# Patient Record
Sex: FEMALE | Race: WHITE | NOT HISPANIC OR LATINO | Employment: OTHER | URBAN - METROPOLITAN AREA
[De-identification: names, ages, dates, MRNs, and addresses within clinical notes are randomized per-mention and may not be internally consistent; named-entity substitution may affect disease eponyms.]

---

## 2017-01-18 ENCOUNTER — HOSPITAL ENCOUNTER (OUTPATIENT)
Dept: NON INVASIVE DIAGNOSTICS | Facility: CLINIC | Age: 82
Discharge: HOME | End: 2017-01-18
Payer: MEDICARE

## 2017-01-18 DIAGNOSIS — I65.29 OCCLUSION AND STENOSIS OF UNSPECIFIED CAROTID ARTERY: ICD-10-CM

## 2017-01-18 PROCEDURE — 93880 EXTRACRANIAL BILAT STUDY: CPT

## 2017-09-20 DIAGNOSIS — I65.29 OCCLUSION AND STENOSIS OF UNSPECIFIED CAROTID ARTERY: ICD-10-CM

## 2017-09-25 ENCOUNTER — HOSPITAL ENCOUNTER (OUTPATIENT)
Dept: NON INVASIVE DIAGNOSTICS | Facility: CLINIC | Age: 82
Discharge: HOME | End: 2017-09-25
Payer: MEDICARE

## 2017-09-25 DIAGNOSIS — I65.29 OCCLUSION AND STENOSIS OF UNSPECIFIED CAROTID ARTERY: ICD-10-CM

## 2017-09-25 PROCEDURE — 93880 EXTRACRANIAL BILAT STUDY: CPT

## 2018-03-10 ENCOUNTER — HOSPITAL ENCOUNTER (OUTPATIENT)
Facility: HOSPITAL | Age: 83
Setting detail: OBSERVATION
Discharge: HOME | End: 2018-03-11
Attending: SURGERY | Admitting: SURGERY
Payer: MEDICARE

## 2018-03-10 ENCOUNTER — APPOINTMENT (OUTPATIENT)
Dept: RADIOLOGY | Facility: HOSPITAL | Age: 83
End: 2018-03-10
Payer: MEDICARE

## 2018-03-10 DIAGNOSIS — S06.5X9A SDH (SUBDURAL HEMATOMA) (HCC): ICD-10-CM

## 2018-03-10 DIAGNOSIS — S42.343A: Primary | ICD-10-CM

## 2018-03-10 PROCEDURE — 73070 X-RAY EXAM OF ELBOW: CPT

## 2018-03-10 PROCEDURE — 99220 PR INITIAL OBSERVATION CARE/DAY 70 MINUTES: CPT | Performed by: SURGERY

## 2018-03-10 RX ORDER — METHOCARBAMOL 500 MG/1
500 TABLET, FILM COATED ORAL EVERY 8 HOURS SCHEDULED
Status: DISCONTINUED | OUTPATIENT
Start: 2018-03-10 | End: 2018-03-11 | Stop reason: HOSPADM

## 2018-03-10 RX ORDER — ACETAMINOPHEN 325 MG/1
975 TABLET ORAL EVERY 8 HOURS SCHEDULED
Status: DISCONTINUED | OUTPATIENT
Start: 2018-03-10 | End: 2018-03-11 | Stop reason: HOSPADM

## 2018-03-10 RX ADMIN — METHOCARBAMOL 500 MG: 500 TABLET ORAL at 23:50

## 2018-03-10 RX ADMIN — ACETAMINOPHEN 975 MG: 325 TABLET, FILM COATED ORAL at 23:50

## 2018-03-11 ENCOUNTER — APPOINTMENT (OUTPATIENT)
Dept: RADIOLOGY | Facility: HOSPITAL | Age: 83
End: 2018-03-11
Payer: MEDICARE

## 2018-03-11 VITALS
WEIGHT: 179.9 LBS | TEMPERATURE: 98.2 F | RESPIRATION RATE: 18 BRPM | DIASTOLIC BLOOD PRESSURE: 63 MMHG | BODY MASS INDEX: 31.88 KG/M2 | HEIGHT: 63 IN | HEART RATE: 94 BPM | OXYGEN SATURATION: 94 % | SYSTOLIC BLOOD PRESSURE: 143 MMHG

## 2018-03-11 PROBLEM — S06.5X9A SDH (SUBDURAL HEMATOMA) (HCC): Status: ACTIVE | Noted: 2018-03-11

## 2018-03-11 PROBLEM — S42.343A: Status: ACTIVE | Noted: 2018-03-11

## 2018-03-11 PROBLEM — S06.5XAA SDH (SUBDURAL HEMATOMA): Status: ACTIVE | Noted: 2018-03-11

## 2018-03-11 LAB
ABO GROUP BLD: NORMAL
ANION GAP SERPL CALCULATED.3IONS-SCNC: 10 MMOL/L (ref 4–13)
BASOPHILS # BLD AUTO: 0.02 THOUSANDS/ΜL (ref 0–0.1)
BASOPHILS NFR BLD AUTO: 0 % (ref 0–1)
BLD GP AB SCN SERPL QL: NEGATIVE
BUN SERPL-MCNC: 24 MG/DL (ref 5–25)
CALCIUM SERPL-MCNC: 9.1 MG/DL (ref 8.3–10.1)
CHLORIDE SERPL-SCNC: 107 MMOL/L (ref 100–108)
CO2 SERPL-SCNC: 21 MMOL/L (ref 21–32)
CREAT SERPL-MCNC: 0.97 MG/DL (ref 0.6–1.3)
EOSINOPHIL # BLD AUTO: 0.02 THOUSAND/ΜL (ref 0–0.61)
EOSINOPHIL NFR BLD AUTO: 0 % (ref 0–6)
ERYTHROCYTE [DISTWIDTH] IN BLOOD BY AUTOMATED COUNT: 12.5 % (ref 11.6–15.1)
GFR SERPL CREATININE-BSD FRML MDRD: 54 ML/MIN/1.73SQ M
GLUCOSE SERPL-MCNC: 125 MG/DL (ref 65–140)
HCT VFR BLD AUTO: 37.5 % (ref 34.8–46.1)
HGB BLD-MCNC: 12.9 G/DL (ref 11.5–15.4)
INR PPP: 1.08 (ref 0.86–1.16)
LYMPHOCYTES # BLD AUTO: 1.09 THOUSANDS/ΜL (ref 0.6–4.47)
LYMPHOCYTES NFR BLD AUTO: 12 % (ref 14–44)
MCH RBC QN AUTO: 31.8 PG (ref 26.8–34.3)
MCHC RBC AUTO-ENTMCNC: 34.4 G/DL (ref 31.4–37.4)
MCV RBC AUTO: 92 FL (ref 82–98)
MONOCYTES # BLD AUTO: 0.68 THOUSAND/ΜL (ref 0.17–1.22)
MONOCYTES NFR BLD AUTO: 8 % (ref 4–12)
NEUTROPHILS # BLD AUTO: 7.01 THOUSANDS/ΜL (ref 1.85–7.62)
NEUTS SEG NFR BLD AUTO: 80 % (ref 43–75)
NRBC BLD AUTO-RTO: 0 /100 WBCS
PLATELET # BLD AUTO: 275 THOUSANDS/UL (ref 149–390)
PMV BLD AUTO: 9.7 FL (ref 8.9–12.7)
POTASSIUM SERPL-SCNC: 4.1 MMOL/L (ref 3.5–5.3)
PROTHROMBIN TIME: 14 SECONDS (ref 12.1–14.4)
RBC # BLD AUTO: 4.06 MILLION/UL (ref 3.81–5.12)
RH BLD: POSITIVE
SODIUM SERPL-SCNC: 138 MMOL/L (ref 136–145)
SPECIMEN EXPIRATION DATE: NORMAL
WBC # BLD AUTO: 8.84 THOUSAND/UL (ref 4.31–10.16)

## 2018-03-11 PROCEDURE — 86901 BLOOD TYPING SEROLOGIC RH(D): CPT | Performed by: PHYSICIAN ASSISTANT

## 2018-03-11 PROCEDURE — 70450 CT HEAD/BRAIN W/O DYE: CPT

## 2018-03-11 PROCEDURE — 80048 BASIC METABOLIC PNL TOTAL CA: CPT | Performed by: PHYSICIAN ASSISTANT

## 2018-03-11 PROCEDURE — G8988 SELF CARE GOAL STATUS: HCPCS

## 2018-03-11 PROCEDURE — 99217 PR OBSERVATION CARE DISCHARGE MANAGEMENT: CPT | Performed by: NURSE PRACTITIONER

## 2018-03-11 PROCEDURE — G8989 SELF CARE D/C STATUS: HCPCS

## 2018-03-11 PROCEDURE — 97166 OT EVAL MOD COMPLEX 45 MIN: CPT

## 2018-03-11 PROCEDURE — G8987 SELF CARE CURRENT STATUS: HCPCS

## 2018-03-11 PROCEDURE — 85025 COMPLETE CBC W/AUTO DIFF WBC: CPT | Performed by: PHYSICIAN ASSISTANT

## 2018-03-11 PROCEDURE — 85610 PROTHROMBIN TIME: CPT | Performed by: PHYSICIAN ASSISTANT

## 2018-03-11 PROCEDURE — G8978 MOBILITY CURRENT STATUS: HCPCS

## 2018-03-11 PROCEDURE — 99204 OFFICE O/P NEW MOD 45 MIN: CPT | Performed by: ORTHOPAEDIC SURGERY

## 2018-03-11 PROCEDURE — 86900 BLOOD TYPING SEROLOGIC ABO: CPT | Performed by: PHYSICIAN ASSISTANT

## 2018-03-11 PROCEDURE — G8979 MOBILITY GOAL STATUS: HCPCS

## 2018-03-11 PROCEDURE — 99285 EMERGENCY DEPT VISIT HI MDM: CPT

## 2018-03-11 PROCEDURE — 86850 RBC ANTIBODY SCREEN: CPT | Performed by: PHYSICIAN ASSISTANT

## 2018-03-11 PROCEDURE — 97162 PT EVAL MOD COMPLEX 30 MIN: CPT

## 2018-03-11 PROCEDURE — G8980 MOBILITY D/C STATUS: HCPCS

## 2018-03-11 RX ORDER — CARVEDILOL 6.25 MG/1
6.25 TABLET ORAL 2 TIMES DAILY
COMMUNITY

## 2018-03-11 RX ORDER — LEVOTHYROXINE SODIUM 0.03 MG/1
25 TABLET ORAL DAILY
COMMUNITY
End: 2021-02-20 | Stop reason: SDUPTHER

## 2018-03-11 RX ORDER — IBUPROFEN 200 MG
400 TABLET ORAL EVERY 6 HOURS PRN
COMMUNITY
End: 2018-03-11 | Stop reason: HOSPADM

## 2018-03-11 RX ORDER — ENALAPRIL MALEATE 20 MG/1
20 TABLET ORAL 2 TIMES DAILY
COMMUNITY

## 2018-03-11 RX ORDER — ACETAMINOPHEN 325 MG/1
975 TABLET ORAL EVERY 8 HOURS
Qty: 30 TABLET | Refills: 0 | Status: SHIPPED | OUTPATIENT
Start: 2018-03-11 | End: 2021-02-24 | Stop reason: ALTCHOICE

## 2018-03-11 RX ORDER — PRAVASTATIN SODIUM 40 MG
40 TABLET ORAL
COMMUNITY

## 2018-03-11 RX ORDER — METHOCARBAMOL 500 MG/1
500 TABLET, FILM COATED ORAL EVERY 8 HOURS SCHEDULED
Qty: 20 TABLET | Refills: 0 | Status: SHIPPED | OUTPATIENT
Start: 2018-03-11 | End: 2020-10-01 | Stop reason: ALTCHOICE

## 2018-03-11 RX ORDER — SODIUM CHLORIDE 9 MG/ML
75 INJECTION, SOLUTION INTRAVENOUS CONTINUOUS
Status: DISCONTINUED | OUTPATIENT
Start: 2018-03-11 | End: 2018-03-11

## 2018-03-11 RX ORDER — RABEPRAZOLE SODIUM 20 MG/1
20 TABLET, DELAYED RELEASE ORAL DAILY
COMMUNITY
End: 2021-03-14

## 2018-03-11 RX ADMIN — ACETAMINOPHEN 975 MG: 325 TABLET, FILM COATED ORAL at 06:49

## 2018-03-11 RX ADMIN — METHOCARBAMOL 500 MG: 500 TABLET ORAL at 06:49

## 2018-03-11 RX ADMIN — SODIUM CHLORIDE 75 ML/HR: 0.9 INJECTION, SOLUTION INTRAVENOUS at 01:45
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
## 2018-03-11 NOTE — PHYSICAL THERAPY NOTE
Physical Therapy Evaluation     Patient's Name: Shante Nazario    Admitting Diagnosis  [facility] ) [I62 00]  Spiral fracture of shaft of humerus [S42 343A]  Unspecified multiple injuries, initial encounter [T07  XXXA]    Problem List  Patient Active Problem List   Diagnosis    Spiral fracture of shaft of humerus    SDH (subdural hematoma) (HCC)       Past Medical History  Past Medical History:   Diagnosis Date    Disease of thyroid gland     Hyperlipidemia     Hypertension     Pacemaker        Past Surgical History  Past Surgical History:   Procedure Laterality Date    JOINT REPLACEMENT      both hips    TUBAL LIGATION          03/11/18 1104   Note Type   Note type Eval only   Pain Assessment   Pain Assessment No/denies pain   Pain Score No Pain   Home Living   Type of [address] One level;Stairs to enter with rails  (1 ADWOA)   Prior Function   Level of [place] Independent with ADLs and functional mobility   Lives With Alone   Receives Help From Family   ADL Assistance Independent   IADLs Independent   Falls in the last 6 months 1 to 4  (1- CURRENT; DENIES OTHERS )   Vocational Retired   Restrictions/Precautions   [place] Bearing Precautions Per Order Yes   LUE Weight Bearing Per Order NWB   Braces or Orthoses Sling  (L UE SHOULDER IMMOBILIZER )   Other Precautions Fall Risk;WBS;Multiple lines; Chair Alarm   General   Family/Caregiver Present Yes  (SON )   Cognition   Overall Cognitive Status WFL   RUE Assessment   RUE Assessment WFL   LUE Assessment   LUE Assessment X  (IMMOBILIZED)   RLE Assessment   RLE Assessment WFL   LLE Assessment   LLE Assessment WFL   Bed Mobility   Supine to Sit 6  Modified independent   Additional items Increased time required   Sit to Supine Unable to assess   Transfers   Sit to Stand 6  Modified independent   Additional items Increased time required   Stand to Sit 6  Modified independent   Additional items Increased time required Ambulation/Elevation   Gait pattern WNL   Gait Assistance 5  Supervision   Assistive Device None   Distance 400 FEET   Stair Management Assistance 5  Supervision   Stair Management Technique One rail R;Foreward   Number of Stairs 1   Balance   Static Sitting Good   Static Standing Good   Ambulatory Good   Endurance Deficit   Endurance Deficit No   Activity Tolerance   Activity Tolerance Patient tolerated treatment well   Medical Staff Made Aware OT TAMARA    Assessment   Prognosis Good   Problem List Decreased strength;Decreased range of motion; Impaired balance;Orthopedic restrictions   Assessment PT COMPLETED EVALUATION OF 80YEAR OLD FEMALE ADMITTED TO [facility] AS TRANSFER FROM [facility] WHERE SHE INITIALLY PRESENTED S/P MECHANICAL FALL OUTSIDE LEAVING [address]  DIAGNOSIS INCLUDES L DISTAL HUMERUS FRACTURE  PER ORTHOPEDICS PATIENT IS NWB L UE IN SHOULDER IMMOBILIZER AND IS RECOMMENDED TO FOLLOW UP WITH HER ORTHOPEDIC GROUP ONCE D/C FROM HOSPITAL (PER PATIENT PREFERENCE)  CURRENT EMERGING STATUS INCLUDES ONGOING HR/O2 MONITORING, UTILIZATION OF IV FLUIDS, FALLS RISK, AND A REGRESSION IN FUNCTIONAL STATUS FROM BASELINE  PMH IS SIGNIFICANT FOR PACEMAKER, HTN, AND B/L HIP REPLACEMENTS  PRIOR TO THIS ADMISSION PATIENT RESIDED ALONE IN ONE LEVEL HOME (1 ADWOA) WHERE SHE WAS PREVIOUSLY I WITH MOBILITY (NO AD), ADLS, AND IADLS (- )  CURRENT IMPAIRMENTS INCLUDE IMMOBILIZATION OF L UE WITH STRENGTH AND ROM DEFICITS AND DECREASED BALANCE  DURING PT EVALUATION PATIENT AMBULATED 400 FEET W/O AD AND NAVIGATED 1 STEP S LEVEL  PATIENT REPORTS FAMILY WILL BE ABLE TO CHECK ON HER DAILY AT HOME AND OFFER ASSISTANCE PRN  AT THIS POINT IN TIME SHE DEMONSTRATES SAFE MOBILITY AND DENIES QUESTIONS/CONCERNS ABOUT D/C HOME  RECOMMEND D/C HOME WITH FAMILY ASSIST PRN  D/C INPT PT  RECOMMEND CONTINUED MOBILITY WITH RN STAFF      Goals   Patient Goals TO GO HOME    Treatment Day 0   Recommendation   Recommendation Home with family support PT - OK to Discharge Yes  (HOME W/ FAMILY ASSIST PRN )   Barthel Index   Feeding 5   Bathing 0   Grooming Score 0   Dressing Score 5   Bladder Score 10   Bowels Score 10   Toilet Use Score 10   Transfers (Bed/Chair) Score 15   Mobility (Level Surface) Score 15   Stairs Score 10   Barthel Index Score 80           Lucrecia Senior, PT

## 2018-03-11 NOTE — CONSULTS
Orthopedics   Claudell Cornea Worrich 80 y o  female MRN: [id number]  Unit/Bed#: [facility] 928-01      Chief Complaint:   right elbow pain    HPI:   80 y o  right hand dominant female who fell at [employer] yesterday complaining of right elbow pain  Initially went to [facility] where she was seen by an orthopaedist and splinted  Was transferred to [facility] for a SDH  Currently complains of right elbow pain that is non radiating and made worse with motion or contact to the area  Pain relieved by rest   Denies any numbness/tingling in her right hand or ipsilateral wrist or shoulder pain  Review Of Systems:   · Skin: Normal  · Neuro: See HPI  · Musculoskeletal: See HPI  · 14 point review of systems negative except as stated above     Past Medical History:   Past Medical History:   Diagnosis Date    Disease of thyroid gland     Hyperlipidemia     Hypertension     Pacemaker        Past Surgical History:   Past Surgical History:   Procedure Laterality Date    JOINT REPLACEMENT      both hips    TUBAL LIGATION         Family History:  Family history reviewed and non-contributory  Family History   Problem Relation Age of Onset    Heart disease Mother     Cancer Father        Social History:  Social History     Social History    Marital status:      Spouse name: N/A    Number of children: N/A    Years of education: N/A     Social History Main Topics    Smoking status: Never Smoker    Smokeless tobacco: Never Used    Alcohol use No    Drug use: No    Sexual activity: Not Asked     Other Topics Concern    None     Social History Narrative    None       Allergies:    Allergies   Allergen Reactions    Codeine GI Intolerance     Light headed    Penicillins GI Intolerance           Labs:    0  Lab Value Date/Time   HCT 37 5 03/11/2018 0524   HGB 12 9 03/11/2018 0524   INR 1 08 03/11/2018 0524   WBC 8 84 03/11/2018 0524       Meds:    Current Facility-Administered Medications:     acetaminophen (TYLENOL) tablet 975 mg, 975 mg, Oral, Q8H [address], Mari Yanez PA-C, 975 mg at 03/10/18 2350    methocarbamol (ROBAXIN) tablet 500 mg, 500 mg, Oral, Q8H D[facility]EL, Mari Yanez PA-C, 500 mg at 03/10/18 2350    sodium chloride 0 9 % infusion, 75 mL/hr, Intravenous, Continuous, Everett Bryan, Last Rate: 75 mL/hr at 03/11/18 0551, 75 mL/hr at 03/11/18 0551    Blood Culture:   No results found for: BLOODCX    Wound Culture:   No results found for: WOUNDCULT    Ins and Outs:  I/O last 24 hours: In: 307 5 [I V :307 5]  Out: -           Physical Exam:   /60 (BP Location: Right arm)   Pulse 97   Temp 98 5 °F (36 9 °C) (Oral)   Resp 18   Ht 5' 3" (1 6 m)   Wt 81 6 kg (179 lb 14 3 oz)   SpO2 95%   BMI 31 87 kg/m²   Gen: Alert and oriented to person, place, time  HEENT: EOMI, eyes clear, moist mucus membranes, hearing intact  Respiratory: Bilateral chest rise  No audible wheezing found  Cardiovascular: Regular Rate and Rhythm  Abdomen: soft nontender/nondistended  Musculoskeletal: right upper extremity  · Splint c/d/i  · Tender to palpation over elbow  · Sensation intact to median, radial, ulnar, median, nerve distributions  · Motor intact to ain/pin/m/r/u nerve distributions  · Fingers warm and well perfused    Radiology:   I personally reviewed the films  X-rays right elbow shows distal humerus fracture    _*_*_*_*_*_*_*_*_*_*_*_*_*_*_*_*_*_*_*_*_*_*_*_*_*_*_*_*_*_*_*_*_*_*_*_*_*_*_*_*_*    Assessment:  · 80 y  o female S/P fall on ice with right distal humerus fracture    Plan:   · Non weight bearing right upper extremity in posterior slab splint  · Pt will benefit from operative stabilization of her fracture  She lives closer to [facility], already has a relationship with their orthopaedic group, and wishes to follow up there      · Analgesics for pain  · Dispo: Ortho signing off      Jeanie Powell MD
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
## 2018-03-11 NOTE — PROGRESS NOTES
Progress Note - Trauma   Tiffany Rodriguez 80 y o  female MRN: [id number]  Unit/Bed#: [facility] 928-01 Encounter: [id number]    Assessment/Plan:     Patient Active Problem List   Diagnosis    Spiral fracture of shaft of humerus    SDH (subdural hematoma) (HCC)       Subdural Hemorrhage   Neurosurgery consult pending  Neurochecks q2 hours  Repeat CT head  Hold all AP/AC       Spiral fracture of distal humerus status post splinting @ [facility] evaluated patient, however patient wants to follow up with her own Orthopedic surgeon at [facility] that did the splint in the first place  (Dr Xochitl Ortez)   No Further intervention at this time  NWB GUERLINE, splinted    Proph:  SCDs, hold all AP/AC    Dispo:  PT/OT  Pending Neurosurgery consult      Chief Complaint: None    Subjective:     Objective:     Meds/Allergies   Prescriptions Prior to Admission   Medication Sig Dispense Refill Last Dose    carvedilol (COREG) 6 25 mg tablet Take 6 25 mg by mouth 2 (two) times a day       ibuprofen (MOTRIN) 200 mg tablet Take 400 mg by mouth every 6 (six) hours as needed for mild pain       aspirin 81 MG tablet Take 81 mg by mouth daily       enalapril (VASOTEC) 20 mg tablet Take 20 mg by mouth 2 (two) times a day       levothyroxine 25 mcg tablet Take 25 mcg by mouth daily       pravastatin (PRAVACHOL) 40 mg tablet Take 40 mg by mouth daily at bedtime         RABEprazole (ACIPHEX) 20 MG tablet Take 20 mg by mouth daily            Vitals: Blood pressure 136/64, pulse 93, temperature 98 8 °F (37 1 °C), temperature source Oral, resp  rate 18, height 5' 3" (1 6 m), weight 81 6 kg (179 lb 14 3 oz), SpO2 96 %  Body mass index is 31 87 kg/m²   SpO2: SpO2: 96 %    ABG: No results found for: PHART, ZIY2YBJ, PO2ART, TIP1KUI, T4QBUBQC, BEART, SOURCE      Intake/Output Summary (Last 24 hours) at 03/11/18 1034  Last data filed at 03/11/18 0929   Gross per 24 hour   Intake            357 5 ml   Output                0 ml   Net            357 5 ml Invasive Devices     Peripheral Intravenous Line            Peripheral IV 03/10/18 Right Antecubital less than 1 day                Nutrition/GI Proph/Bowel Reg: NPO    Physical Exam   Constitutional: She appears well-developed and well-nourished  No distress  HENT:   Head: Normocephalic and atraumatic  Right Ear: External ear normal    Left Ear: External ear normal    Eyes: EOM are normal  Pupils are equal, round, and reactive to light  Right eye exhibits no discharge  Left eye exhibits no discharge  Neck: Normal range of motion  Neck supple  Cardiovascular: Normal rate, regular rhythm and normal heart sounds  Pulmonary/Chest: Effort normal and breath sounds normal  No respiratory distress  Abdominal: Soft  Bowel sounds are normal  She exhibits no distension  There is no tenderness  Musculoskeletal:   LUE in spint, shoulder sling, distally neurovascularly intact, compartments soft   Skin: Skin is warm and dry  Capillary refill takes less than 2 seconds  She is not diaphoretic  No erythema  Psychiatric: She has a normal mood and affect  Lab Results:   Results: I have personally reviewed pertinent reports   , BMP/CMP:   Lab Results   Component Value Date     03/11/2018    K 4 1 03/11/2018     03/11/2018    CO2 21 03/11/2018    ANIONGAP 10 03/11/2018    BUN 24 03/11/2018    CREATININE 0 97 03/11/2018    GLUCOSE 125 03/11/2018    CALCIUM 9 1 03/11/2018    EGFR 54 03/11/2018    and CBC:   Lab Results   Component Value Date    WBC 8 84 03/11/2018    HGB 12 9 03/11/2018    HCT 37 5 03/11/2018    MCV 92 03/11/2018     03/11/2018    MCH 31 8 03/11/2018    MCHC 34 4 03/11/2018    RDW 12 5 03/11/2018    MPV 9 7 03/11/2018    NRBC 0 03/11/2018     Imaging/EKG Studies: Results: I have personally reviewed pertinent reports      Other Studies:   VTE Prophylaxis: SCDs
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
## 2018-03-11 NOTE — DISCHARGE INSTRUCTIONS
Nonweight bearing to Right Upper Extremity  Follow up with Dr Ronni Crigler as she had previously discussed with him      Do not take Aspirin or Nsaids such as Ibuprofen, Advil or Alleve until cleared by Neurosurgery

## 2018-03-11 NOTE — SOCIAL WORK
CM met with Pt and son Marino Gates with an introduction and explanation of role  Pt reported residing alone in a ranch style home with no use of DME and 1 step to enter the home  Pt reported being independent with ADLs with no hx of VNA, SNF, mental health or drug/alcohol placements  Pt denied having a living will and reported using [facility] pharmacy on [address]  CM reviewed d/c planning process including the following: identifying help at home, patient preference for d/c planning needs, Discharge Lounge, [facility] Meds to Bed program, availability of treatment team to discuss questions or concerns patient and/or family may have regarding understanding medications and recognizing signs and symptoms once discharged  CM also encouraged patient to follow up with all recommended appointments after discharge  Patient advised of importance for patient and family to participate in managing patients medical well being

## 2018-07-05 DIAGNOSIS — I65.23 BILATERAL CAROTID ARTERY STENOSIS: Primary | ICD-10-CM

## 2018-09-04 ENCOUNTER — HOSPITAL ENCOUNTER (OUTPATIENT)
Dept: NON INVASIVE DIAGNOSTICS | Facility: CLINIC | Age: 83
Discharge: HOME | End: 2018-09-04
Payer: MEDICARE

## 2018-09-04 DIAGNOSIS — I65.23 BILATERAL CAROTID ARTERY STENOSIS: ICD-10-CM

## 2018-09-04 PROCEDURE — 93880 EXTRACRANIAL BILAT STUDY: CPT | Performed by: SURGERY

## 2018-09-04 PROCEDURE — 93880 EXTRACRANIAL BILAT STUDY: CPT

## 2019-03-13 DIAGNOSIS — I65.23 BILATERAL CAROTID ARTERY STENOSIS: Primary | ICD-10-CM

## 2019-03-22 ENCOUNTER — TELEPHONE (OUTPATIENT)
Dept: ADMINISTRATIVE | Facility: HOSPITAL | Age: 84
End: 2019-03-22

## 2019-05-01 ENCOUNTER — HOSPITAL ENCOUNTER (OUTPATIENT)
Dept: NON INVASIVE DIAGNOSTICS | Facility: CLINIC | Age: 84
Discharge: HOME | End: 2019-05-01
Payer: MEDICARE

## 2019-05-01 DIAGNOSIS — I65.23 BILATERAL CAROTID ARTERY STENOSIS: ICD-10-CM

## 2019-05-01 PROCEDURE — 93880 EXTRACRANIAL BILAT STUDY: CPT

## 2019-05-02 PROCEDURE — 93880 EXTRACRANIAL BILAT STUDY: CPT | Performed by: SURGERY

## 2019-11-08 DIAGNOSIS — I65.23 CAROTID STENOSIS, BILATERAL: Primary | ICD-10-CM

## 2019-11-22 ENCOUNTER — HOSPITAL ENCOUNTER (OUTPATIENT)
Dept: NON INVASIVE DIAGNOSTICS | Facility: CLINIC | Age: 84
Discharge: HOME | End: 2019-11-22
Payer: MEDICARE

## 2019-11-22 DIAGNOSIS — I65.23 CAROTID STENOSIS, BILATERAL: ICD-10-CM

## 2019-11-22 PROCEDURE — 93880 EXTRACRANIAL BILAT STUDY: CPT | Performed by: SURGERY

## 2019-11-22 PROCEDURE — 93880 EXTRACRANIAL BILAT STUDY: CPT

## 2020-08-06 DIAGNOSIS — I65.23 CAROTID STENOSIS, BILATERAL: Primary | ICD-10-CM

## 2020-08-24 ENCOUNTER — HOSPITAL ENCOUNTER (OUTPATIENT)
Dept: NON INVASIVE DIAGNOSTICS | Facility: CLINIC | Age: 85
Discharge: HOME | End: 2020-08-24
Payer: MEDICARE

## 2020-08-24 DIAGNOSIS — I65.23 CAROTID STENOSIS, BILATERAL: ICD-10-CM

## 2020-08-24 PROCEDURE — 93880 EXTRACRANIAL BILAT STUDY: CPT

## 2020-08-24 PROCEDURE — 93880 EXTRACRANIAL BILAT STUDY: CPT | Performed by: SURGERY

## 2020-09-21 ENCOUNTER — TRANSCRIBE ORDERS (OUTPATIENT)
Dept: LAB | Facility: CLINIC | Age: 85
End: 2020-09-21

## 2020-09-21 ENCOUNTER — APPOINTMENT (OUTPATIENT)
Dept: LAB | Facility: CLINIC | Age: 85
End: 2020-09-21
Payer: MEDICARE

## 2020-09-21 DIAGNOSIS — I51.9 MYXEDEMA HEART DISEASE: ICD-10-CM

## 2020-09-21 DIAGNOSIS — E78.5 HYPERLIPOPROTEINEMIA: ICD-10-CM

## 2020-09-21 DIAGNOSIS — Z79.899 ENCOUNTER FOR LONG-TERM (CURRENT) USE OF OTHER MEDICATIONS: ICD-10-CM

## 2020-09-21 DIAGNOSIS — R73.9 BLOOD GLUCOSE ELEVATED: ICD-10-CM

## 2020-09-21 DIAGNOSIS — E03.9 MYXEDEMA HEART DISEASE: ICD-10-CM

## 2020-09-21 DIAGNOSIS — R73.9 BLOOD GLUCOSE ELEVATED: Primary | ICD-10-CM

## 2020-09-21 DIAGNOSIS — I10 ESSENTIAL HYPERTENSION, MALIGNANT: ICD-10-CM

## 2020-09-21 LAB
ALBUMIN SERPL BCP-MCNC: 3.8 G/DL (ref 3.5–5)
ALP SERPL-CCNC: 73 U/L (ref 46–116)
ALT SERPL W P-5'-P-CCNC: 23 U/L (ref 12–78)
ANION GAP SERPL CALCULATED.3IONS-SCNC: 9 MMOL/L (ref 4–13)
AST SERPL W P-5'-P-CCNC: 20 U/L (ref 5–45)
BACTERIA UR QL AUTO: ABNORMAL /HPF
BASOPHILS # BLD AUTO: 0.04 THOUSANDS/ΜL (ref 0–0.1)
BASOPHILS NFR BLD AUTO: 1 % (ref 0–1)
BILIRUB SERPL-MCNC: 0.44 MG/DL (ref 0.2–1)
BILIRUB UR QL STRIP: NEGATIVE
BUN SERPL-MCNC: 22 MG/DL (ref 5–25)
CALCIUM SERPL-MCNC: 9.5 MG/DL (ref 8.3–10.1)
CHLORIDE SERPL-SCNC: 104 MMOL/L (ref 100–108)
CHOLEST SERPL-MCNC: 179 MG/DL (ref 50–200)
CK SERPL-CCNC: 62 U/L (ref 26–192)
CLARITY UR: CLEAR
CO2 SERPL-SCNC: 25 MMOL/L (ref 21–32)
COLOR UR: YELLOW
CREAT SERPL-MCNC: 1.13 MG/DL (ref 0.6–1.3)
EOSINOPHIL # BLD AUTO: 0.17 THOUSAND/ΜL (ref 0–0.61)
EOSINOPHIL NFR BLD AUTO: 3 % (ref 0–6)
ERYTHROCYTE [DISTWIDTH] IN BLOOD BY AUTOMATED COUNT: 12.4 % (ref 11.6–15.1)
EST. AVERAGE GLUCOSE BLD GHB EST-MCNC: 108 MG/DL
GFR SERPL CREATININE-BSD FRML MDRD: 44 ML/MIN/1.73SQ M
GLUCOSE P FAST SERPL-MCNC: 100 MG/DL (ref 65–99)
GLUCOSE UR STRIP-MCNC: NEGATIVE MG/DL
HBA1C MFR BLD: 5.4 %
HCT VFR BLD AUTO: 37.7 % (ref 34.8–46.1)
HDLC SERPL-MCNC: 65 MG/DL
HGB BLD-MCNC: 12.3 G/DL (ref 11.5–15.4)
HGB UR QL STRIP.AUTO: NEGATIVE
HYALINE CASTS #/AREA URNS LPF: ABNORMAL /LPF
IMM GRANULOCYTES # BLD AUTO: 0.04 THOUSAND/UL (ref 0–0.2)
IMM GRANULOCYTES NFR BLD AUTO: 1 % (ref 0–2)
KETONES UR STRIP-MCNC: NEGATIVE MG/DL
LDLC SERPL CALC-MCNC: 99 MG/DL (ref 0–100)
LEUKOCYTE ESTERASE UR QL STRIP: ABNORMAL
LYMPHOCYTES # BLD AUTO: 1.24 THOUSANDS/ΜL (ref 0.6–4.47)
LYMPHOCYTES NFR BLD AUTO: 24 % (ref 14–44)
MCH RBC QN AUTO: 32.1 PG (ref 26.8–34.3)
MCHC RBC AUTO-ENTMCNC: 32.6 G/DL (ref 31.4–37.4)
MCV RBC AUTO: 98 FL (ref 82–98)
MONOCYTES # BLD AUTO: 0.49 THOUSAND/ΜL (ref 0.17–1.22)
MONOCYTES NFR BLD AUTO: 10 % (ref 4–12)
MUCOUS THREADS UR QL AUTO: ABNORMAL
NEUTROPHILS # BLD AUTO: 3.18 THOUSANDS/ΜL (ref 1.85–7.62)
NEUTS SEG NFR BLD AUTO: 61 % (ref 43–75)
NITRITE UR QL STRIP: NEGATIVE
NON-SQ EPI CELLS URNS QL MICRO: ABNORMAL /HPF
NONHDLC SERPL-MCNC: 114 MG/DL
NRBC BLD AUTO-RTO: 0 /100 WBCS
OTHER STN SPEC: ABNORMAL
PH UR STRIP.AUTO: 6 [PH]
PLATELET # BLD AUTO: 228 THOUSANDS/UL (ref 149–390)
PMV BLD AUTO: 8.9 FL (ref 8.9–12.7)
POTASSIUM SERPL-SCNC: 4.7 MMOL/L (ref 3.5–5.3)
PROT SERPL-MCNC: 7.3 G/DL (ref 6.4–8.2)
PROT UR STRIP-MCNC: NEGATIVE MG/DL
RBC # BLD AUTO: 3.83 MILLION/UL (ref 3.81–5.12)
RBC #/AREA URNS AUTO: ABNORMAL /HPF
SODIUM SERPL-SCNC: 138 MMOL/L (ref 136–145)
SP GR UR STRIP.AUTO: 1.02 (ref 1–1.03)
TRIGL SERPL-MCNC: 73 MG/DL
TSH SERPL DL<=0.05 MIU/L-ACNC: 2.15 UIU/ML (ref 0.36–3.74)
UROBILINOGEN UR QL STRIP.AUTO: 0.2 E.U./DL
WBC # BLD AUTO: 5.16 THOUSAND/UL (ref 4.31–10.16)
WBC #/AREA URNS AUTO: ABNORMAL /HPF

## 2020-09-21 PROCEDURE — 81001 URINALYSIS AUTO W/SCOPE: CPT | Performed by: INTERNAL MEDICINE

## 2020-09-21 PROCEDURE — 85025 COMPLETE CBC W/AUTO DIFF WBC: CPT

## 2020-09-21 PROCEDURE — 80061 LIPID PANEL: CPT

## 2020-09-21 PROCEDURE — 80053 COMPREHEN METABOLIC PANEL: CPT

## 2020-09-21 PROCEDURE — 82550 ASSAY OF CK (CPK): CPT

## 2020-09-21 PROCEDURE — 83036 HEMOGLOBIN GLYCOSYLATED A1C: CPT

## 2020-09-21 PROCEDURE — 84443 ASSAY THYROID STIM HORMONE: CPT

## 2020-09-21 PROCEDURE — 36415 COLL VENOUS BLD VENIPUNCTURE: CPT

## 2020-09-30 ENCOUNTER — TELEPHONE (OUTPATIENT)
Dept: VASCULAR SURGERY | Facility: CLINIC | Age: 85
End: 2020-09-30

## 2020-10-01 ENCOUNTER — OFFICE VISIT (OUTPATIENT)
Dept: VASCULAR SURGERY | Facility: CLINIC | Age: 85
End: 2020-10-01
Payer: MEDICARE

## 2020-10-01 VITALS
SYSTOLIC BLOOD PRESSURE: 118 MMHG | HEART RATE: 90 BPM | BODY MASS INDEX: 31.18 KG/M2 | DIASTOLIC BLOOD PRESSURE: 70 MMHG | TEMPERATURE: 98.4 F | WEIGHT: 176 LBS | HEIGHT: 63 IN

## 2020-10-01 DIAGNOSIS — I65.23 CAROTID STENOSIS, ASYMPTOMATIC, BILATERAL: Primary | ICD-10-CM

## 2020-10-01 PROBLEM — I35.0 AORTIC VALVE STENOSIS: Status: ACTIVE | Noted: 2020-10-01

## 2020-10-01 PROBLEM — M19.90 OSTEOARTHRITIS: Status: ACTIVE | Noted: 2020-10-01

## 2020-10-01 PROCEDURE — 99203 OFFICE O/P NEW LOW 30 MIN: CPT | Performed by: NURSE PRACTITIONER

## 2020-10-01 RX ORDER — ASPIRIN 81 MG/1
81 TABLET, CHEWABLE ORAL DAILY
COMMUNITY

## 2020-10-03 ENCOUNTER — HOSPITAL ENCOUNTER (EMERGENCY)
Facility: HOSPITAL | Age: 85
Discharge: HOME | End: 2020-10-03
Attending: EMERGENCY MEDICINE | Admitting: EMERGENCY MEDICINE
Payer: MEDICARE

## 2020-10-03 VITALS
RESPIRATION RATE: 18 BRPM | SYSTOLIC BLOOD PRESSURE: 178 MMHG | TEMPERATURE: 97.4 F | BODY MASS INDEX: 31.91 KG/M2 | DIASTOLIC BLOOD PRESSURE: 78 MMHG | WEIGHT: 180.12 LBS | HEART RATE: 96 BPM | OXYGEN SATURATION: 97 %

## 2020-10-03 DIAGNOSIS — R19.7 DIARRHEA: Primary | ICD-10-CM

## 2020-10-03 LAB
ALBUMIN SERPL BCP-MCNC: 4 G/DL (ref 3.5–5)
ALP SERPL-CCNC: 84 U/L (ref 46–116)
ALT SERPL W P-5'-P-CCNC: 23 U/L (ref 12–78)
ANION GAP SERPL CALCULATED.3IONS-SCNC: 11 MMOL/L (ref 4–13)
APTT PPP: 29 SECONDS (ref 23–37)
AST SERPL W P-5'-P-CCNC: 20 U/L (ref 5–45)
BASOPHILS # BLD AUTO: 0.03 THOUSANDS/ΜL (ref 0–0.1)
BASOPHILS NFR BLD AUTO: 0 % (ref 0–1)
BILIRUB SERPL-MCNC: 0.57 MG/DL (ref 0.2–1)
BUN SERPL-MCNC: 14 MG/DL (ref 5–25)
CALCIUM SERPL-MCNC: 9.7 MG/DL (ref 8.3–10.1)
CHLORIDE SERPL-SCNC: 100 MMOL/L (ref 100–108)
CO2 SERPL-SCNC: 22 MMOL/L (ref 21–32)
CREAT SERPL-MCNC: 1.05 MG/DL (ref 0.6–1.3)
EOSINOPHIL # BLD AUTO: 0.07 THOUSAND/ΜL (ref 0–0.61)
EOSINOPHIL NFR BLD AUTO: 1 % (ref 0–6)
ERYTHROCYTE [DISTWIDTH] IN BLOOD BY AUTOMATED COUNT: 12.5 % (ref 11.6–15.1)
GFR SERPL CREATININE-BSD FRML MDRD: 48 ML/MIN/1.73SQ M
GLUCOSE SERPL-MCNC: 133 MG/DL (ref 65–140)
HCT VFR BLD AUTO: 39.2 % (ref 34.8–46.1)
HGB BLD-MCNC: 13.2 G/DL (ref 11.5–15.4)
IMM GRANULOCYTES # BLD AUTO: 0.04 THOUSAND/UL (ref 0–0.2)
IMM GRANULOCYTES NFR BLD AUTO: 1 % (ref 0–2)
INR PPP: 0.99 (ref 0.84–1.19)
LIPASE SERPL-CCNC: 125 U/L (ref 73–393)
LYMPHOCYTES # BLD AUTO: 0.97 THOUSANDS/ΜL (ref 0.6–4.47)
LYMPHOCYTES NFR BLD AUTO: 14 % (ref 14–44)
MCH RBC QN AUTO: 32 PG (ref 26.8–34.3)
MCHC RBC AUTO-ENTMCNC: 33.7 G/DL (ref 31.4–37.4)
MCV RBC AUTO: 95 FL (ref 82–98)
MONOCYTES # BLD AUTO: 0.52 THOUSAND/ΜL (ref 0.17–1.22)
MONOCYTES NFR BLD AUTO: 8 % (ref 4–12)
NEUTROPHILS # BLD AUTO: 5.24 THOUSANDS/ΜL (ref 1.85–7.62)
NEUTS SEG NFR BLD AUTO: 76 % (ref 43–75)
NRBC BLD AUTO-RTO: 0 /100 WBCS
PLATELET # BLD AUTO: 266 THOUSANDS/UL (ref 149–390)
PMV BLD AUTO: 9.2 FL (ref 8.9–12.7)
POTASSIUM SERPL-SCNC: 4 MMOL/L (ref 3.5–5.3)
PROT SERPL-MCNC: 8.1 G/DL (ref 6.4–8.2)
PROTHROMBIN TIME: 13.2 SECONDS (ref 11.6–14.5)
RBC # BLD AUTO: 4.12 MILLION/UL (ref 3.81–5.12)
SODIUM SERPL-SCNC: 133 MMOL/L (ref 136–145)
WBC # BLD AUTO: 6.87 THOUSAND/UL (ref 4.31–10.16)

## 2020-10-03 PROCEDURE — 99284 EMERGENCY DEPT VISIT MOD MDM: CPT

## 2020-10-03 PROCEDURE — 80053 COMPREHEN METABOLIC PANEL: CPT | Performed by: EMERGENCY MEDICINE

## 2020-10-03 PROCEDURE — 96360 HYDRATION IV INFUSION INIT: CPT

## 2020-10-03 PROCEDURE — 99282 EMERGENCY DEPT VISIT SF MDM: CPT | Performed by: EMERGENCY MEDICINE

## 2020-10-03 PROCEDURE — 85730 THROMBOPLASTIN TIME PARTIAL: CPT | Performed by: EMERGENCY MEDICINE

## 2020-10-03 PROCEDURE — 83690 ASSAY OF LIPASE: CPT | Performed by: EMERGENCY MEDICINE

## 2020-10-03 PROCEDURE — 36415 COLL VENOUS BLD VENIPUNCTURE: CPT | Performed by: EMERGENCY MEDICINE

## 2020-10-03 PROCEDURE — 85610 PROTHROMBIN TIME: CPT | Performed by: EMERGENCY MEDICINE

## 2020-10-03 PROCEDURE — 85025 COMPLETE CBC W/AUTO DIFF WBC: CPT | Performed by: EMERGENCY MEDICINE

## 2020-10-03 RX ORDER — IBUPROFEN 200 MG
200 TABLET ORAL AS NEEDED
COMMUNITY
End: 2021-05-26 | Stop reason: ALTCHOICE

## 2020-10-03 RX ORDER — SODIUM CHLORIDE 9 MG/ML
125 INJECTION, SOLUTION INTRAVENOUS CONTINUOUS
Status: DISCONTINUED | OUTPATIENT
Start: 2020-10-03 | End: 2020-10-03 | Stop reason: HOSPADM

## 2020-10-03 RX ADMIN — SODIUM CHLORIDE 125 ML/HR: 0.9 INJECTION, SOLUTION INTRAVENOUS at 09:20

## 2021-01-20 DIAGNOSIS — Z23 ENCOUNTER FOR IMMUNIZATION: ICD-10-CM

## 2021-01-29 ENCOUNTER — IMMUNIZATIONS (OUTPATIENT)
Dept: FAMILY MEDICINE CLINIC | Facility: HOSPITAL | Age: 86
End: 2021-01-29

## 2021-01-29 DIAGNOSIS — Z23 ENCOUNTER FOR IMMUNIZATION: Primary | ICD-10-CM

## 2021-01-29 PROCEDURE — 0011A SARS-COV-2 / COVID-19 MRNA VACCINE (MODERNA) 100 MCG: CPT

## 2021-01-29 PROCEDURE — 91301 SARS-COV-2 / COVID-19 MRNA VACCINE (MODERNA) 100 MCG: CPT

## 2021-02-20 PROBLEM — E78.2 MIXED HYPERLIPIDEMIA: Status: ACTIVE | Noted: 2021-02-20

## 2021-02-20 PROBLEM — M15.9 PRIMARY OSTEOARTHRITIS INVOLVING MULTIPLE JOINTS: Status: ACTIVE | Noted: 2021-02-20

## 2021-02-20 PROBLEM — M19.90 OSTEOARTHRITIS: Status: RESOLVED | Noted: 2020-10-01 | Resolved: 2021-02-20

## 2021-02-20 PROBLEM — M15.0 PRIMARY OSTEOARTHRITIS INVOLVING MULTIPLE JOINTS: Status: ACTIVE | Noted: 2021-02-20

## 2021-02-20 PROBLEM — Z95.0 HISTORY OF PERMANENT CARDIAC PACEMAKER PLACEMENT: Status: ACTIVE | Noted: 2019-09-05

## 2021-02-20 RX ORDER — AMLODIPINE BESYLATE 5 MG/1
1 TABLET ORAL DAILY
COMMUNITY

## 2021-02-20 RX ORDER — LEVOTHYROXINE SODIUM 0.05 MG/1
1 TABLET ORAL DAILY
COMMUNITY
End: 2021-04-09

## 2021-02-24 ENCOUNTER — OFFICE VISIT (OUTPATIENT)
Dept: INTERNAL MEDICINE CLINIC | Facility: CLINIC | Age: 86
End: 2021-02-24
Payer: MEDICARE

## 2021-02-24 VITALS
HEIGHT: 62 IN | OXYGEN SATURATION: 97 % | TEMPERATURE: 98.4 F | HEART RATE: 83 BPM | BODY MASS INDEX: 32.94 KG/M2 | DIASTOLIC BLOOD PRESSURE: 78 MMHG | WEIGHT: 179 LBS | SYSTOLIC BLOOD PRESSURE: 124 MMHG

## 2021-02-24 DIAGNOSIS — R73.9 HYPERGLYCEMIA: ICD-10-CM

## 2021-02-24 DIAGNOSIS — K21.9 GASTROESOPHAGEAL REFLUX DISEASE WITHOUT ESOPHAGITIS: ICD-10-CM

## 2021-02-24 DIAGNOSIS — E03.9 ACQUIRED HYPOTHYROIDISM: ICD-10-CM

## 2021-02-24 DIAGNOSIS — E78.2 MIXED HYPERLIPIDEMIA: ICD-10-CM

## 2021-02-24 DIAGNOSIS — M15.9 PRIMARY OSTEOARTHRITIS INVOLVING MULTIPLE JOINTS: ICD-10-CM

## 2021-02-24 DIAGNOSIS — I10 ESSENTIAL HYPERTENSION: Primary | ICD-10-CM

## 2021-02-24 DIAGNOSIS — I65.23 CAROTID STENOSIS, ASYMPTOMATIC, BILATERAL: ICD-10-CM

## 2021-02-24 PROCEDURE — 99214 OFFICE O/P EST MOD 30 MIN: CPT | Performed by: INTERNAL MEDICINE

## 2021-02-24 RX ORDER — SENNA PLUS 8.6 MG/1
1 TABLET ORAL DAILY
COMMUNITY
End: 2021-02-24 | Stop reason: ALTCHOICE

## 2021-02-24 NOTE — PATIENT INSTRUCTIONS
Patient advised to continue present medications discussed with the patient medications and laboratory data in detail  Follow-up with me in 3 months    If any blood test was ordered please do 1 week prior to next appointment  If you have any questions please call the office [phone]

## 2021-02-25 ENCOUNTER — IMMUNIZATIONS (OUTPATIENT)
Dept: FAMILY MEDICINE CLINIC | Facility: HOSPITAL | Age: 86
End: 2021-02-25

## 2021-02-25 DIAGNOSIS — Z23 ENCOUNTER FOR IMMUNIZATION: Primary | ICD-10-CM

## 2021-02-25 PROCEDURE — 91301 SARS-COV-2 / COVID-19 MRNA VACCINE (MODERNA) 100 MCG: CPT

## 2021-02-25 PROCEDURE — 0012A SARS-COV-2 / COVID-19 MRNA VACCINE (MODERNA) 100 MCG: CPT

## 2021-02-25 NOTE — ASSESSMENT & PLAN NOTE
Her cholesterol 179 triglyceride 73 LDL 99  Her cardiologist increased her  pravastatin 40 mg daily and was advised to take extra 1 pill every Monday and every Thursday  will follow  lipid panel

## 2021-02-25 NOTE — ASSESSMENT & PLAN NOTE
Her symptoms are controlled on AcipHex  She tried H2 blocker which was not effective  Has been watching diet and advised with GE reflux precautions

## 2021-02-25 NOTE — ASSESSMENT & PLAN NOTE
Patient tried acetaminophen which was not effective  She occasionally takes ibuprofen which is effective  Advised to take least amount of NSAID with foods

## 2021-02-25 NOTE — PROGRESS NOTES
Assessment/Plan:    1  Essential hypertension  Assessment & Plan:  Her blood pressure is well controlled  Advised to continue present medications  Advised for low-salt diet  Orders:  -     Comprehensive metabolic panel; Future    2  Acquired hypothyroidism  Assessment & Plan:  TSH is within normal limit  Advised to continue present dose of levothyroxine  Orders:  -     TSH, 3rd generation; Future    3  Mixed hyperlipidemia  Assessment & Plan:  Her cholesterol 179 triglyceride 73 LDL 99  Her cardiologist increased her  pravastatin 40 mg daily and was advised to take extra 1 pill every Monday and every Thursday  will follow  lipid panel  Orders:  -     Lipid panel; Future    4  Hyperglycemia  Assessment & Plan:  Her fasting blood sugar of 133  And hemoglobin A1c 5 4, advised to watch diet for sugar and carbs intake  Orders:  -     Hemoglobin A1C; Future; Expected date: 05/10/2021    5  Carotid stenosis, asymptomatic, bilateral  Assessment & Plan:  Patient has been followed by specialist and has been getting carotid artery Doppler   6  BMI 32 0-32 9,adult    7  Gastroesophageal reflux disease without esophagitis  Assessment & Plan:  Her symptoms are controlled on AcipHex  She tried H2 blocker which was not effective  Has been watching diet and advised with GE reflux precautions  8  Primary osteoarthritis involving multiple joints  Assessment & Plan:  Patient tried acetaminophen which was not effective  She occasionally takes ibuprofen which is effective  Advised to take least amount of NSAID with foods  BMI Counseling: Body mass index is 32 74 kg/m²  The BMI is above normal  Nutrition recommendations include decreasing portion sizes, consuming healthier snacks, limiting drinks that contain sugar, moderation in carbohydrate intake, reducing intake of saturated and trans fat and reducing intake of cholesterol   Exercise recommendations include exercising 3-5 times per week and strength training exercises  No pharmacotherapy was ordered  Subjective:  Patient presents for follow-up  Patient ID: Cecy Lyn is a 80 y o  female  HPI   51-year-old white female patient here for follow-up  She denies any chest pain, shortness of breath, pain in abdomen  She underwent her pacemaker battery revision about 2 months ago  She denies any cough, fever, chills  She denies any pain in abdomen nausea, vomiting, diarrhea  The following portions of the patient's history were reviewed and updated as appropriate:     Past Medical History:  She has a past medical history of Allergic rhinitis, Aortic stenosis, moderate, Bleeding per rectum, BMI 32 0-32 9,adult (2/24/2021), Colonoscopy refused, Conjunctivitis, allergic, Constipation, Disease of thyroid gland, GERD (gastroesophageal reflux disease), Herpes zoster, Hyperlipidemia, Hypertension, Pacemaker, and Post herpetic neuralgia ,  _______________________________________________________________________  Past Surgical History:   has a past surgical history that includes Joint replacement; Tubal ligation; Cardiac pacemaker placement; and Total hip arthroplasty (Left, 02/2015)  ,  _______________________________________________________________________  Family History:  family history includes Cancer in her father; Heart disease in her mother ,  _______________________________________________________________________  Social History:   reports that she has never smoked  She has never used smokeless tobacco  She reports that she does not drink alcohol or use drugs  ,  _______________________________________________________________________  Allergies:  is allergic to ampicillin; codeine; and penicillins     _______________________________________________________________________  Current Outpatient Medications   Medication Sig Dispense Refill    amLODIPine (NORVASC) 5 mg tablet Take 1 tablet by mouth daily      aspirin 81 mg chewable tablet Chew 81 mg daily      carvedilol (COREG) 6 25 mg tablet Take 6 25 mg by mouth 2 (two) times a day      enalapril (VASOTEC) 20 mg tablet Take 20 mg by mouth 2 (two) times a day      ibuprofen (Advil) 200 mg tablet Take 200 mg by mouth as needed for mild pain      levothyroxine 50 mcg tablet Take 1 tablet by mouth daily      pravastatin (PRAVACHOL) 40 mg tablet Take 40 mg by mouth daily at bedtime        RABEprazole (ACIPHEX) 20 MG tablet Take 20 mg by mouth daily         No current facility-administered medications for this visit       _______________________________________________________________________  Review of Systems   Constitutional: Negative for chills, fatigue and fever  HENT: Negative for congestion, ear pain, hearing loss, nosebleeds, sinus pain, sore throat and trouble swallowing  Eyes: Negative for discharge, redness and visual disturbance  Respiratory: Negative for cough, chest tightness and shortness of breath  Cardiovascular: Negative for chest pain and palpitations  Gastrointestinal: Negative for abdominal pain, blood in stool, constipation, diarrhea, nausea and vomiting  Genitourinary: Negative for dysuria, flank pain, frequency and hematuria  Musculoskeletal: Positive for arthralgias (Sometimes gets pain in the knees and fingers joints  )  Negative for myalgias and neck pain  Skin: Negative for color change and rash  Neurological: Negative for dizziness, speech difficulty, weakness and headaches  Hematological: Does not bruise/bleed easily  Psychiatric/Behavioral: Negative for agitation and behavioral problems  Objective:  Vitals:    02/24/21 1016   BP: 124/78   BP Location: Right arm   Patient Position: Sitting   Cuff Size: Adult   Pulse: 83   Temp: 98 4 °F (36 9 °C)   TempSrc: Tympanic   SpO2: 97%   Weight: 81 2 kg (179 lb)   Height: 5' 2" (1 575 m)     Body mass index is 32 74 kg/m²  Physical Exam  Vitals signs and nursing note reviewed     Constitutional: General: She is not in acute distress  Appearance: Normal appearance  HENT:      Head: Normocephalic and atraumatic  Right Ear: External ear normal       Left Ear: External ear normal       Mouth/Throat:      Comments: Patient has face mask on  Eyes:      General: No scleral icterus  Extraocular Movements: Extraocular movements intact  Conjunctiva/sclera: Conjunctivae normal    Neck:      Musculoskeletal: Normal range of motion and neck supple  Cardiovascular:      Rate and Rhythm: Normal rate and regular rhythm  Pulses: Normal pulses  Heart sounds: No murmur  Pulmonary:      Effort: Pulmonary effort is normal       Breath sounds: Normal breath sounds  Abdominal:      General: Bowel sounds are normal       Palpations: Abdomen is soft  There is no mass  Tenderness: There is no abdominal tenderness  Musculoskeletal: Normal range of motion  General: Tenderness (Has mild tenderness in the knees and finger joints  Has a DJD changes of the finger joints  Has a good range of motion joints ) present  Right lower leg: No edema  Left lower leg: No edema  Skin:     General: Skin is warm  Neurological:      General: No focal deficit present  Mental Status: She is alert and oriented to person, place, and time  Psychiatric:         Mood and Affect: Mood normal          Behavior: Behavior normal        I spent 30 minutes with the patient today    More than 50% time spent for reviewing of external notes, reviewing of the results of diagnostics test, management of care, patient education and ordering of test

## 2021-02-25 NOTE — ASSESSMENT & PLAN NOTE
Her fasting blood sugar of 133  And hemoglobin A1c 5 4, advised to watch diet for sugar and carbs intake

## 2021-02-25 NOTE — ASSESSMENT & PLAN NOTE
Her blood pressure is well controlled  Advised to continue present medications  Advised for low-salt diet

## 2021-03-14 DIAGNOSIS — K21.9 GASTROESOPHAGEAL REFLUX DISEASE WITHOUT ESOPHAGITIS: Primary | ICD-10-CM

## 2021-03-14 RX ORDER — RABEPRAZOLE SODIUM 20 MG/1
TABLET, DELAYED RELEASE ORAL
Qty: 30 TABLET | Refills: 5 | Status: SHIPPED | OUTPATIENT
Start: 2021-03-14 | End: 2021-05-26 | Stop reason: ALTCHOICE

## 2021-04-02 ENCOUNTER — HOSPITAL ENCOUNTER (OUTPATIENT)
Dept: NON INVASIVE DIAGNOSTICS | Facility: CLINIC | Age: 86
Discharge: HOME | End: 2021-04-02
Payer: MEDICARE

## 2021-04-02 DIAGNOSIS — I65.23 CAROTID STENOSIS, ASYMPTOMATIC, BILATERAL: ICD-10-CM

## 2021-04-02 PROCEDURE — 93880 EXTRACRANIAL BILAT STUDY: CPT

## 2021-04-02 PROCEDURE — 93880 EXTRACRANIAL BILAT STUDY: CPT | Performed by: SURGERY

## 2021-04-03 ENCOUNTER — TRANSCRIBE ORDERS (OUTPATIENT)
Dept: LAB | Facility: CLINIC | Age: 86
End: 2021-04-03

## 2021-04-03 ENCOUNTER — APPOINTMENT (OUTPATIENT)
Dept: LAB | Facility: CLINIC | Age: 86
End: 2021-04-03
Payer: MEDICARE

## 2021-04-03 DIAGNOSIS — E03.9 MYXEDEMA HEART DISEASE: ICD-10-CM

## 2021-04-03 DIAGNOSIS — I35.0 NODULAR CALCIFIC AORTIC VALVE STENOSIS: ICD-10-CM

## 2021-04-03 DIAGNOSIS — E78.2 MIXED HYPERLIPIDEMIA: ICD-10-CM

## 2021-04-03 DIAGNOSIS — I10 ESSENTIAL HYPERTENSION, MALIGNANT: ICD-10-CM

## 2021-04-03 DIAGNOSIS — E03.9 ACQUIRED HYPOTHYROIDISM: ICD-10-CM

## 2021-04-03 DIAGNOSIS — I10 ESSENTIAL HYPERTENSION: ICD-10-CM

## 2021-04-03 DIAGNOSIS — I51.9 MYXEDEMA HEART DISEASE: ICD-10-CM

## 2021-04-03 DIAGNOSIS — R73.9 HYPERGLYCEMIA: ICD-10-CM

## 2021-04-03 DIAGNOSIS — I10 ESSENTIAL HYPERTENSION, MALIGNANT: Primary | ICD-10-CM

## 2021-04-03 LAB
ALBUMIN SERPL BCP-MCNC: 3.7 G/DL (ref 3.5–5)
ALP SERPL-CCNC: 87 U/L (ref 46–116)
ALT SERPL W P-5'-P-CCNC: 23 U/L (ref 12–78)
ANION GAP SERPL CALCULATED.3IONS-SCNC: 9 MMOL/L (ref 4–13)
AST SERPL W P-5'-P-CCNC: 18 U/L (ref 5–45)
BILIRUB DIRECT SERPL-MCNC: 0.1 MG/DL (ref 0–0.2)
BILIRUB SERPL-MCNC: 0.37 MG/DL (ref 0.2–1)
BUN SERPL-MCNC: 20 MG/DL (ref 5–25)
CALCIUM SERPL-MCNC: 9.2 MG/DL (ref 8.3–10.1)
CHLORIDE SERPL-SCNC: 106 MMOL/L (ref 100–108)
CHOLEST SERPL-MCNC: 177 MG/DL (ref 50–200)
CO2 SERPL-SCNC: 26 MMOL/L (ref 21–32)
CREAT SERPL-MCNC: 0.99 MG/DL (ref 0.6–1.3)
ERYTHROCYTE [DISTWIDTH] IN BLOOD BY AUTOMATED COUNT: 11.8 % (ref 11.6–15.1)
EST. AVERAGE GLUCOSE BLD GHB EST-MCNC: 108 MG/DL
GFR SERPL CREATININE-BSD FRML MDRD: 51 ML/MIN/1.73SQ M
GLUCOSE P FAST SERPL-MCNC: 110 MG/DL (ref 65–99)
HBA1C MFR BLD: 5.4 %
HCT VFR BLD AUTO: 41.1 % (ref 34.8–46.1)
HDLC SERPL-MCNC: 73 MG/DL
HGB BLD-MCNC: 13.5 G/DL (ref 11.5–15.4)
LDLC SERPL CALC-MCNC: 91 MG/DL (ref 0–100)
MCH RBC QN AUTO: 31.8 PG (ref 26.8–34.3)
MCHC RBC AUTO-ENTMCNC: 32.8 G/DL (ref 31.4–37.4)
MCV RBC AUTO: 97 FL (ref 82–98)
NONHDLC SERPL-MCNC: 104 MG/DL
PLATELET # BLD AUTO: 238 THOUSANDS/UL (ref 149–390)
PMV BLD AUTO: 9.1 FL (ref 8.9–12.7)
POTASSIUM SERPL-SCNC: 4.3 MMOL/L (ref 3.5–5.3)
PROT SERPL-MCNC: 7.4 G/DL (ref 6.4–8.2)
RBC # BLD AUTO: 4.25 MILLION/UL (ref 3.81–5.12)
SODIUM SERPL-SCNC: 141 MMOL/L (ref 136–145)
T4 FREE SERPL-MCNC: 1.11 NG/DL (ref 0.76–1.46)
TRIGL SERPL-MCNC: 63 MG/DL
TSH SERPL DL<=0.05 MIU/L-ACNC: 1.72 UIU/ML (ref 0.36–3.74)
WBC # BLD AUTO: 5.69 THOUSAND/UL (ref 4.31–10.16)

## 2021-04-03 PROCEDURE — 36415 COLL VENOUS BLD VENIPUNCTURE: CPT

## 2021-04-03 PROCEDURE — 84443 ASSAY THYROID STIM HORMONE: CPT

## 2021-04-03 PROCEDURE — 84439 ASSAY OF FREE THYROXINE: CPT

## 2021-04-03 PROCEDURE — 83036 HEMOGLOBIN GLYCOSYLATED A1C: CPT

## 2021-04-03 PROCEDURE — 85027 COMPLETE CBC AUTOMATED: CPT

## 2021-04-03 PROCEDURE — 82248 BILIRUBIN DIRECT: CPT

## 2021-04-03 PROCEDURE — 80061 LIPID PANEL: CPT

## 2021-04-03 PROCEDURE — 80053 COMPREHEN METABOLIC PANEL: CPT

## 2021-04-09 DIAGNOSIS — E03.9 ACQUIRED HYPOTHYROIDISM: Primary | ICD-10-CM

## 2021-04-09 RX ORDER — LEVOTHYROXINE SODIUM 0.05 MG/1
TABLET ORAL
Qty: 30 TABLET | Refills: 5 | Status: SHIPPED | OUTPATIENT
Start: 2021-04-09 | End: 2021-10-01

## 2021-05-26 ENCOUNTER — OFFICE VISIT (OUTPATIENT)
Dept: INTERNAL MEDICINE CLINIC | Facility: CLINIC | Age: 86
End: 2021-05-26
Payer: MEDICARE

## 2021-05-26 VITALS
TEMPERATURE: 98.8 F | HEIGHT: 62 IN | BODY MASS INDEX: 31.47 KG/M2 | SYSTOLIC BLOOD PRESSURE: 130 MMHG | HEART RATE: 77 BPM | DIASTOLIC BLOOD PRESSURE: 84 MMHG | WEIGHT: 171 LBS | OXYGEN SATURATION: 98 %

## 2021-05-26 DIAGNOSIS — E03.9 ACQUIRED HYPOTHYROIDISM: ICD-10-CM

## 2021-05-26 DIAGNOSIS — I65.23 CAROTID STENOSIS, ASYMPTOMATIC, BILATERAL: ICD-10-CM

## 2021-05-26 DIAGNOSIS — I10 ESSENTIAL HYPERTENSION: ICD-10-CM

## 2021-05-26 DIAGNOSIS — K21.9 GASTROESOPHAGEAL REFLUX DISEASE WITHOUT ESOPHAGITIS: Primary | ICD-10-CM

## 2021-05-26 DIAGNOSIS — E78.2 MIXED HYPERLIPIDEMIA: ICD-10-CM

## 2021-05-26 DIAGNOSIS — R73.9 HYPERGLYCEMIA: ICD-10-CM

## 2021-05-26 DIAGNOSIS — R21 SKIN RASH: ICD-10-CM

## 2021-05-26 DIAGNOSIS — I35.0 AORTIC VALVE STENOSIS, ETIOLOGY OF CARDIAC VALVE DISEASE UNSPECIFIED: ICD-10-CM

## 2021-05-26 DIAGNOSIS — M15.9 PRIMARY OSTEOARTHRITIS INVOLVING MULTIPLE JOINTS: ICD-10-CM

## 2021-05-26 PROCEDURE — 99214 OFFICE O/P EST MOD 30 MIN: CPT | Performed by: INTERNAL MEDICINE

## 2021-05-26 PROCEDURE — 1124F ACP DISCUSS-NO DSCNMKR DOCD: CPT | Performed by: INTERNAL MEDICINE

## 2021-05-26 RX ORDER — EZETIMIBE 10 MG/1
10 TABLET ORAL DAILY
COMMUNITY

## 2021-05-26 NOTE — ASSESSMENT & PLAN NOTE
Advised to decrease portion size, decrease carbs sugar intake and low-cholesterol diet and exercise  as much as she can tolerate

## 2021-05-26 NOTE — ASSESSMENT & PLAN NOTE
Blood pressure fairly controlled  Advised to continue present medications    Advised for low-salt diet

## 2021-05-26 NOTE — ASSESSMENT & PLAN NOTE
Her cholesterol 177, triglyceride 63 LDL 91   On pravastatin 40 mg daily her pravastatin increased 40 mg once a day to take extra 40 mg every Monday and Friday by her cardiologist

## 2021-05-26 NOTE — ASSESSMENT & PLAN NOTE
She gets pain in the hips and knees  Patient has been followed by orthopedic specialist Dr Agnieszka Law  Occasionally takes ibuprofen  Patient advised to take acetaminophen instead of ibuprofen due to reflux problem

## 2021-05-26 NOTE — ASSESSMENT & PLAN NOTE
Patient stopped taking Rabeprazole for last 3 days  She gets sometime burning sensation in throat which she has been drinking water  She changed her diet advised to try famotidine 20 mg p o  B i d  P isabel Retana

## 2021-05-26 NOTE — ASSESSMENT & PLAN NOTE
On arms  She was seen by dermatologist and was diagnosed with eczema  She was advised to use lubricant derm lotion  Still see gets rash  Discussed with the patient avoid hot water shower as see has been using hot water  for shower    Follow-up with dermatology as needed

## 2021-05-26 NOTE — PATIENT INSTRUCTIONS
Patient advised to continue present medications discussed with the patient medications and laboratory data in detail  Follow-up with me in 3 months    If any blood test was ordered please do 1 week prior to next appointment  If you have any questions please call the office [phone]

## 2021-05-26 NOTE — PROGRESS NOTES
Assessment/Plan:    1  Gastroesophageal reflux disease without esophagitis  Assessment & Plan:  Patient stopped taking Rabeprazole for last 3 days  She gets sometime burning sensation in throat which she has been drinking water  She changed her diet advised to try famotidine 20 mg p o  B i d  P r n  2  Acquired hypothyroidism  Assessment & Plan:  TSH 1 71  Advised to continue present dose of levothyroxine      3  Essential hypertension  Assessment & Plan:  Blood pressure fairly controlled  Advised to continue present medications  Advised for low-salt diet      4  Hyperglycemia  Assessment & Plan:  Fasting blood sugar 110 and hemoglobin A1c 5 4  Advised to watch diet for sugar and carbs intake      5  Mixed hyperlipidemia  Assessment & Plan:  Her cholesterol 177, triglyceride 63 LDL 91  On pravastatin 40 mg daily her pravastatin increased 40 mg once a day to take extra 40 mg every Monday and Friday by her cardiologist         6  Aortic valve stenosis, etiology of cardiac valve disease unspecified  Assessment & Plan:  Patient has been followed by cardiologist   Recently seen by Dr Melodie Waldrop      7  BMI 31 0-31 9,adult  Assessment & Plan:  Advised to decrease portion size, decrease carbs sugar intake and low-cholesterol diet and exercise  as much as she can tolerate      8  Carotid stenosis, asymptomatic, bilateral  Assessment & Plan:  Presently asymptomatic  Patient had been followed by vascular surgery  9  Primary osteoarthritis involving multiple joints  Assessment & Plan:  She gets pain in the hips and knees  Patient has been followed by orthopedic specialist Dr Jose Maria Garzon  Occasionally takes ibuprofen  Patient advised to take acetaminophen instead of ibuprofen due to reflux problem  10  Skin rash  Assessment & Plan: On arms  She was seen by dermatologist and was diagnosed with eczema  She was advised to use lubricant derm lotion  Still see gets rash    Discussed with the patient avoid hot water shower as see has been using hot water  for shower  Follow-up with dermatology as needed            Subjective:  Patient presents for follow-up      Patient ID: Casey Godinze is a 80 y o  female  HPI   77-year-old white female patient presents for follow-up of her medical problems  She denies any chest pain, shortness with, pain abdomen  Denies any cough, fever, chills  She got her COVID-19 vaccination  She stopped her AcipHex for last 3 days sometimes gets discomfort and burning in the throat for which she has been drinking more water  Denies any nausea, vomiting, diarrhea, pain in abdomen, constipation  The following portions of the patient's history were reviewed and updated as appropriate:     Past Medical History:  She has a past medical history of Allergic rhinitis, Aortic stenosis, moderate, Bleeding per rectum, Blood glucose elevated, BMI 32 0-32 9,adult (2/24/2021), Carotid arterial disease ([facility] [address] ), Colonoscopy refused, Conjunctivitis, allergic, Constipation, Disease of thyroid gland, Elbow fracture, GERD (gastroesophageal reflux disease), Herpes zoster, Hyperlipidemia, Hypertension, Pacemaker, Post herpetic neuralgia, and Skin rash (5/26/2021)  ,  _______________________________________________________________________  Past Surgical History:   has a past surgical history that includes Joint replacement; Tubal ligation; Cardiac pacemaker placement; and Total hip arthroplasty (Left, 02/2015)  ,  _______________________________________________________________________  Family History:  family history includes Cancer in her father; Heart disease in her mother ,  _______________________________________________________________________  Social History:   reports that she has never smoked  She has never used smokeless tobacco  She reports that she does not drink alcohol or use drugs  ,  _______________________________________________________________________  Allergies:  is allergic to ampicillin; codeine; and penicillins     _______________________________________________________________________  Current Outpatient Medications   Medication Sig Dispense Refill    amLODIPine (NORVASC) 5 mg tablet Take 1 tablet by mouth daily      aspirin 81 mg chewable tablet Chew 81 mg daily      carvedilol (COREG) 6 25 mg tablet Take 6 25 mg by mouth 2 (two) times a day      enalapril (VASOTEC) 20 mg tablet Take 20 mg by mouth 2 (two) times a day      ezetimibe (ZETIA) 10 mg tablet Take 10 mg by mouth daily      levothyroxine 50 mcg tablet TAKE 1 TABLET BY MOUTH EVERY DAY 30 tablet 5    pravastatin (PRAVACHOL) 40 mg tablet Take 40 mg by mouth daily at bedtime         No current facility-administered medications for this visit       _______________________________________________________________________  Review of Systems   Constitutional: Negative for chills and fever  HENT: Negative for congestion, ear pain, hearing loss, nosebleeds, sinus pain, sore throat and trouble swallowing  Eyes: Negative for discharge, redness and visual disturbance  Respiratory: Negative for cough, chest tightness and shortness of breath  Cardiovascular: Negative for chest pain and palpitations  Gastrointestinal: Negative for abdominal pain, blood in stool, constipation, diarrhea, nausea and vomiting  Genitourinary: Negative for dysuria, flank pain, frequency and hematuria  Musculoskeletal: Positive for arthralgias (Gets pain in the hips and knees for which she had seen orthopedic specialist   Has osteoarthritis  )  Negative for myalgias and neck pain  Skin: Positive for rash ( has a rash on arms for which she was seen by dermatologist was told she has eczema  )  Negative for color change  Neurological: Negative for dizziness, speech difficulty, weakness and headaches  Hematological: Does not bruise/bleed easily  Psychiatric/Behavioral: Negative for agitation and behavioral problems           Objective:  Vitals: 05/26/21 0932   BP: 130/84   BP Location: Right arm   Patient Position: Sitting   Cuff Size: Adult   Pulse: 77   Temp: 98 8 °F (37 1 °C)   TempSrc: Tympanic   SpO2: 98%   Weight: 77 6 kg (171 lb)   Height: 5' 2" (1 575 m)     Body mass index is 31 28 kg/m²  Physical Exam  Vitals signs and nursing note reviewed  Constitutional:       General: She is not in acute distress  Appearance: Normal appearance  HENT:      Head: Normocephalic and atraumatic  Right Ear: Ear canal and external ear normal       Left Ear: Ear canal and external ear normal       Mouth/Throat:      Comments: Patient has a face mask on  Eyes:      General: No scleral icterus  Extraocular Movements: Extraocular movements intact  Conjunctiva/sclera: Conjunctivae normal    Neck:      Musculoskeletal: Normal range of motion and neck supple  No muscular tenderness  Cardiovascular:      Rate and Rhythm: Normal rate and regular rhythm  Pulses: Normal pulses  Heart sounds: Murmur (9-7/3 systolic murmur aortic area ) present  Pulmonary:      Effort: Pulmonary effort is normal       Breath sounds: Normal breath sounds  Abdominal:      General: Bowel sounds are normal       Palpations: Abdomen is soft  Tenderness: There is no abdominal tenderness  Musculoskeletal: Normal range of motion  General: Tenderness (Patient gets discomfort in hips and knees when she ambulates but she at present does not need any assistance ) present  Right lower leg: No edema  Left lower leg: No edema  Skin:     General: Skin is warm  Findings: Rash ( has a dry scaly slightly edematous scattered rash on both forearms  No vesicles ) present  Neurological:      General: No focal deficit present  Mental Status: She is alert and oriented to person, place, and time  Psychiatric:         Mood and Affect: Mood normal          Behavior: Behavior normal        I spent 30 minutes with the patient today    More than 50% time spent for reviewing of external notes, reviewing of the results of diagnostics test, management of care, patient education and ordering of test

## 2021-08-21 ENCOUNTER — APPOINTMENT (OUTPATIENT)
Dept: LAB | Facility: CLINIC | Age: 86
End: 2021-08-21
Payer: MEDICARE

## 2021-08-21 DIAGNOSIS — I10 ESSENTIAL HYPERTENSION, MALIGNANT: ICD-10-CM

## 2021-08-21 LAB
ANION GAP SERPL CALCULATED.3IONS-SCNC: 9 MMOL/L (ref 4–13)
BUN SERPL-MCNC: 18 MG/DL (ref 5–25)
CALCIUM SERPL-MCNC: 9.7 MG/DL (ref 8.3–10.1)
CHLORIDE SERPL-SCNC: 104 MMOL/L (ref 100–108)
CO2 SERPL-SCNC: 26 MMOL/L (ref 21–32)
CREAT SERPL-MCNC: 1.09 MG/DL (ref 0.6–1.3)
GFR SERPL CREATININE-BSD FRML MDRD: 46 ML/MIN/1.73SQ M
GLUCOSE P FAST SERPL-MCNC: 104 MG/DL (ref 65–99)
MAGNESIUM SERPL-MCNC: 2.2 MG/DL (ref 1.6–2.6)
POTASSIUM SERPL-SCNC: 4.9 MMOL/L (ref 3.5–5.3)
SODIUM SERPL-SCNC: 139 MMOL/L (ref 136–145)

## 2021-08-21 PROCEDURE — 80048 BASIC METABOLIC PNL TOTAL CA: CPT

## 2021-08-21 PROCEDURE — 83735 ASSAY OF MAGNESIUM: CPT

## 2021-08-21 PROCEDURE — 36415 COLL VENOUS BLD VENIPUNCTURE: CPT

## 2021-10-01 DIAGNOSIS — E03.9 ACQUIRED HYPOTHYROIDISM: ICD-10-CM

## 2021-10-01 RX ORDER — LEVOTHYROXINE SODIUM 0.05 MG/1
TABLET ORAL
Qty: 30 TABLET | Refills: 5 | Status: SHIPPED | OUTPATIENT
Start: 2021-10-01 | End: 2022-04-04

## 2021-10-14 ENCOUNTER — OFFICE VISIT (OUTPATIENT)
Dept: INTERNAL MEDICINE CLINIC | Facility: CLINIC | Age: 86
End: 2021-10-14
Payer: MEDICARE

## 2021-10-14 VITALS
SYSTOLIC BLOOD PRESSURE: 128 MMHG | DIASTOLIC BLOOD PRESSURE: 80 MMHG | HEIGHT: 62 IN | OXYGEN SATURATION: 97 % | BODY MASS INDEX: 30.91 KG/M2 | TEMPERATURE: 98.6 F | HEART RATE: 80 BPM | WEIGHT: 168 LBS

## 2021-10-14 DIAGNOSIS — I35.0 AORTIC VALVE STENOSIS, ETIOLOGY OF CARDIAC VALVE DISEASE UNSPECIFIED: ICD-10-CM

## 2021-10-14 DIAGNOSIS — R73.9 HYPERGLYCEMIA: ICD-10-CM

## 2021-10-14 DIAGNOSIS — Z23 NEED FOR PROPHYLACTIC VACCINATION AND INOCULATION AGAINST INFLUENZA: ICD-10-CM

## 2021-10-14 DIAGNOSIS — E78.2 MIXED HYPERLIPIDEMIA: ICD-10-CM

## 2021-10-14 DIAGNOSIS — I10 ESSENTIAL HYPERTENSION: ICD-10-CM

## 2021-10-14 DIAGNOSIS — E03.9 ACQUIRED HYPOTHYROIDISM: ICD-10-CM

## 2021-10-14 DIAGNOSIS — K21.9 GASTROESOPHAGEAL REFLUX DISEASE WITHOUT ESOPHAGITIS: ICD-10-CM

## 2021-10-14 DIAGNOSIS — Z00.00 MEDICARE ANNUAL WELLNESS VISIT, SUBSEQUENT: Primary | ICD-10-CM

## 2021-10-14 PROCEDURE — G0008 ADMIN INFLUENZA VIRUS VAC: HCPCS | Performed by: INTERNAL MEDICINE

## 2021-10-14 PROCEDURE — 99213 OFFICE O/P EST LOW 20 MIN: CPT | Performed by: INTERNAL MEDICINE

## 2021-10-14 PROCEDURE — G0439 PPPS, SUBSEQ VISIT: HCPCS | Performed by: INTERNAL MEDICINE

## 2021-10-14 PROCEDURE — 90662 IIV NO PRSV INCREASED AG IM: CPT | Performed by: INTERNAL MEDICINE

## 2021-10-14 RX ORDER — FAMOTIDINE 20 MG/1
20 TABLET, FILM COATED ORAL AS NEEDED
COMMUNITY

## 2021-12-15 ENCOUNTER — IMMUNIZATIONS (OUTPATIENT)
Dept: FAMILY MEDICINE CLINIC | Facility: HOSPITAL | Age: 86
End: 2021-12-15

## 2021-12-15 DIAGNOSIS — Z23 ENCOUNTER FOR IMMUNIZATION: Primary | ICD-10-CM

## 2021-12-15 PROCEDURE — 91306 COVID-19 MODERNA VACC 0.25 ML BOOSTER: CPT

## 2021-12-15 PROCEDURE — 0064A COVID-19 MODERNA VACC 0.25 ML BOOSTER: CPT

## 2022-01-06 ENCOUNTER — OFFICE VISIT (OUTPATIENT)
Dept: VASCULAR SURGERY | Facility: CLINIC | Age: 87
End: 2022-01-06
Payer: MEDICARE

## 2022-01-06 VITALS
DIASTOLIC BLOOD PRESSURE: 64 MMHG | WEIGHT: 168 LBS | HEIGHT: 62 IN | RESPIRATION RATE: 18 BRPM | TEMPERATURE: 98.2 F | HEART RATE: 76 BPM | BODY MASS INDEX: 30.91 KG/M2 | SYSTOLIC BLOOD PRESSURE: 124 MMHG

## 2022-01-06 DIAGNOSIS — I65.23 CAROTID STENOSIS, ASYMPTOMATIC, BILATERAL: Primary | ICD-10-CM

## 2022-01-06 PROCEDURE — 99213 OFFICE O/P EST LOW 20 MIN: CPT | Performed by: NURSE PRACTITIONER

## 2022-01-06 RX ORDER — LATANOPROST 50 UG/ML
SOLUTION/ DROPS OPHTHALMIC
COMMUNITY
Start: 2021-12-01

## 2022-01-06 NOTE — PATIENT INSTRUCTIONS
Carotid Artery Disease   AMBULATORY CARE:   Carotid artery disease (CAD)  means the major blood vessels in your neck are narrowed or becoming blocked  These 2 major blood vessels are called the carotid arteries  They supply your brain with blood  The narrow or blocked blood vessels increase your risk for a stroke  CAD is also called carotid artery stenosis  Have someone call your local emergency number (911 in the [address] [facility]) if:   · You have any of the following signs of a stroke:      ? Numbness or drooping on one side of your face     ? Weakness in an arm or leg    ? Confusion or difficulty speaking    ? Dizziness, a severe headache, or vision loss    · You have any of the following signs of a heart attack:      ? Squeezing, pressure, or pain in your chest    ? You may  also have any of the following:     § Discomfort or pain in your back, neck, jaw, stomach, or arm    § Shortness of breath    § Nausea or vomiting    § Lightheadedness or a sudden cold sweat    Call your doctor if:   · You have questions or concerns about your condition or care  Common signs and symptoms:  CAD develops slowly  You may have no signs or symptoms until you have a mini-stroke, or transient ischemic attack (TIA)  A TIA is a temporary lack of blood flow to your brain  A TIA goes away quickly and does not cause permanent damage  A TIA may be a warning sign that you are about to have a stroke  If you have any symptoms of a TIA or stroke, seek care immediately  Warning signs of a stroke: The words BE FAST can help you remember and recognize warning signs of a stroke:  · B = Balance:  Sudden loss of balance    · E = Eyes:  Loss of vision in one or both eyes    · F = Face:  Face droops on one side    · A = Arms:  Arm drops when both arms are raised    · S = Speech:  Speech is slurred or sounds different    · T = Time:  Time to get help immediately       Treatment  depends on how narrow your arteries have become   Treatment also depends on your symptoms and your general health  The goal of treatment is to lower your risk for a stroke  You may need any of the following:  · Medicines:      ? Aspirin,  or other blood thinner, may be recommended  These will help prevent blood clots from forming in your carotid arteries  If your healthcare provider wants you to take aspirin, do not take acetaminophen or ibuprofen instead  ? Cholesterol medicine  lowers your cholesterol level  ? Blood pressure medicine  lowers or helps control your blood pressure  · Procedures  can help open blocked arteries:     ? Carotid endarterectomy (CEA)  is used to cut and remove plaque buildup from your arteries  ? Carotid angioplasty and stenting (ARIEL)  is used to push the plaque against the artery wall with a balloon device  Then, a stent is placed to keep the artery open  A stent is a small metal mesh tube  Prevent a stroke:   · Do not smoke, and avoid secondhand smoke  Nicotine and other chemicals in cigarettes and cigars increase your risk for a stroke  Ask your healthcare provider for information if you currently smoke and need help to quit  E-cigarettes or smokeless tobacco still contain nicotine  Talk to your healthcare provider before you use these products  · Eat a variety of healthy foods  Healthy foods include fruit, vegetables, whole-grain breads, low-fat dairy products, chicken, and fish  Choose fish that are high in omega-3 fatty acids, such as salmon and fresh tuna  Ask your healthcare provider for more information on a heart healthy diet and the DASH eating plan  · Limit sodium (salt)  Sodium may increase your blood pressure  Add less table salt to your foods  Read food labels and choose foods that are low in sodium  Your healthcare provider may suggest you follow a low sodium diet  · Reach or maintain a healthy weight  Extra weight makes your heart work harder  Ask your healthcare provider what a healthy weight is for you  He or she can help you create a safe weight loss plan  Even a weight loss of 10% of your extra body weight can help your heart function better  · Exercise regularly  Exercise helps improve heart function and can help you manage your weight  Exercise can also help lower your cholesterol and blood sugar levels  Try to get at least 30 minutes of exercise 5 times each week  Try to be physically active every day  This may include walking, riding a bicycle, or swimming  Your healthcare provider can help you create an exercise plan that works best for you  · Limit alcohol  Alcohol can increase your blood pressure and triglyceride levels  A drink of alcohol is 12 ounces of beer, 5 ounces of wine, or 1½ ounces of liquor  Follow up with your doctor as directed:  Write down your questions so you remember to ask them during your visits  © Copyright [employer] 2021 Information is for End User's use only and may not be sold, redistributed or otherwise used for commercial purposes  All illustrations and images included in CareNotes® are the copyrighted property of [employer]  or [facility] Lilia Castro   The above information is an  only  It is not intended as medical advice for individual conditions or treatments  Talk to your doctor, nurse or pharmacist before following any medical regimen to see if it is safe and effective for you

## 2022-01-06 NOTE — ASSESSMENT & PLAN NOTE
44-year-old female with HTN, HLD, moderate AS, ppm, asymptomatic bilateral carotid stenosis, left greater than right, GERD, hypothyroid, bilateral hip replacements, arthritis who returns to the office for yearly visit to review CV duplex 4/2/21  -CV duplex showed right ICA 50-69% stenosis and left ICA greater than 70+% stenosis with velocities 256/68, ratio 3 91   -Asymptomatic from a carotid standpoint  -Continue aspirin, pravastatin and Zetia  -Due for CV duplex now, scheduled  -Blood pressure acceptable  -Repeat duplex now  -Reviewed signs symptoms of TIA/CVA and that she call 911 immediately should she experience symptoms  -Follow up in the office in 1 year or sooner if change in duplex

## 2022-01-06 NOTE — PROGRESS NOTES
Assessment/Plan:    Carotid stenosis, asymptomatic, bilateral  24-year-old female with HTN, HLD, moderate AS, ppm, asymptomatic bilateral carotid stenosis, left greater than right, GERD, hypothyroid, bilateral hip replacements, arthritis who returns to the office for yearly visit to review CV duplex 4/2/21  -CV duplex showed right ICA 50-69% stenosis and left ICA greater than 70+% stenosis with velocities 256/68, ratio 3 91   -Asymptomatic from a carotid standpoint  -Continue aspirin, pravastatin and Zetia  -Due for CV duplex now, scheduled  -Blood pressure acceptable  -Repeat duplex now  -Reviewed signs symptoms of TIA/CVA and that she call 911 immediately should she experience symptoms  -Follow up in the office in 1 year or sooner if change in duplex         Diagnoses and all orders for this visit:    Carotid stenosis, asymptomatic, bilateral  -     VAS carotid complete study; Future    Other orders  -     latanoprost (XALATAN) 0 005 % ophthalmic solution; INSTILL 1 DROP IN [facility] EYE AT BEDTIME          Subjective:      Patient ID: Gardenia Valdes is a 80 y o  female  Patient presents in office to review the results of her CV done on 4/2/2021  Patient denies any TIA/CVA type symptoms  Patient is taking aspirin and pravastatin  HPI  24-year-old female with HTN, HLD, moderate AS, ppm, asymptomatic bilateral carotid stenosis, left greater than right, GERD, hypothyroid, bilateral hip replacements, arthritis who returns to the office for yearly visit to review CV duplex 4/2/21  Last seen in the office last year by me  She has bilateral carotid stenosis  Asymptomatic  No history of stroke  She is on aspirin and pravastatin  She takes pravastatin 40 mg daily and 80 mg Monday and Friday  She denies any focal neurological events consistent with TIA/CVA  She is able to do her daily activities without any major limitation  She lives alone at home  [place]  Last carotid duplex in April   CV duplex showed right ICA 50-69% stenosis and left ICA greater than 70+% stenosis with velocities 256/68, ratio 3 91   The following portions of the patient's history were reviewed and updated as appropriate: allergies, current medications, past family history, past medical history, past social history, past surgical history and problem list   Review of systems reviewed    Review of Systems   Constitutional: Negative  Negative for chills and fever  HENT: Negative  Negative for hearing loss and trouble swallowing  Eyes: Negative  Respiratory: Negative  Negative for cough, chest tightness and shortness of breath  Cardiovascular: Negative  Negative for leg swelling  Gastrointestinal: Negative  Negative for diarrhea, nausea and vomiting  Endocrine: Negative  Genitourinary: Negative  Musculoskeletal: Negative  Negative for gait problem  Skin: Negative  Negative for wound  Allergic/Immunologic: Negative  Neurological: Negative  Negative for dizziness, speech difficulty, weakness, numbness and headaches  Hematological: Bruises/bleeds easily  Psychiatric/Behavioral: Negative  Negative for self-injury and suicidal ideas  Objective:  I have reviewed and made appropriate changes to the review of systems input by the medical assistant      Vitals:    01/06/22 0836   BP: 124/64   BP Location: Right arm   Patient Position: Sitting   Cuff Size: Adult   Pulse: 76   Resp: 18   Temp: 98 2 °F (36 8 °C)   TempSrc: Tympanic   Weight: 76 2 kg (168 lb)   Height: 5' 2" (1 575 m)       Patient Active Problem List   Diagnosis    Spiral fracture of shaft of humerus    SDH (subdural hematoma) (HCC)    Aortic valve stenosis    Essential hypertension    Carotid stenosis, asymptomatic, bilateral    Gastroesophageal reflux disease without esophagitis    Acquired hypothyroidism    Primary osteoarthritis involving multiple joints    Mixed hyperlipidemia    History of permanent cardiac pacemaker placement    Hyperglycemia    BMI 32 0-32 9,adult    BMI 31 0-31 9,adult    Skin rash    Medicare annual wellness visit, subsequent    BMI 30 0-30 9,adult       Past Surgical History:   Procedure Laterality Date    CARDIAC PACEMAKER PLACEMENT      ppm    JOINT REPLACEMENT      both hips    TOTAL HIP ARTHROPLASTY Left 02/2015    TUBAL LIGATION         Family History   Problem Relation Age of Onset    Heart disease Mother     Cancer Father         colon       Social History     Socioeconomic History    Marital status:       Spouse name: Not on file    Number of children: Not on file    Years of education: Not on file    Highest education level: Not on file   Occupational History    Occupation: Retired    Tobacco Use    Smoking status: Never Smoker    Smokeless tobacco: Never Used   Vaping Use    Vaping Use: Never used   Substance and Sexual Activity    Alcohol use: No     Comment: Occasional use - As per [employer]    Drug use: No    Sexual activity: Not Currently   Other Topics Concern    Not on file   Social History Narrative    Lives alone    Mammogram: Advised, pt refused    Colonoscopy: advised, pt refused    Annual eye exam: sees q2y ophth    Bone density study: Advised, pt refused    Annual dental checkup: Sees dentist as needed    - As per [employer]      Social Determinants of Health     Financial Resource Strain: Not on file   Food Insecurity: Not on file   Transportation Needs: Not on file   Physical Activity: Not on file   Stress: Not on file   Social Connections: Not on file   Intimate Partner Violence: Not on file   Housing Stability: Not on file       Allergies   Allergen Reactions    Ampicillin     Codeine GI Intolerance     Light headed    Penicillins GI Intolerance         Current Outpatient Medications:     amLODIPine (NORVASC) 5 mg tablet, Take 1 tablet by mouth daily, Disp: , Rfl:     aspirin 81 mg chewable tablet, Chew 81 mg daily, Disp: , Rfl:     carvedilol (COREG) 6 25 mg tablet, Take 6 25 mg by mouth 2 (two) times a day, Disp: , Rfl:     enalapril (VASOTEC) 20 mg tablet, Take 20 mg by mouth 2 (two) times a day, Disp: , Rfl:     ezetimibe (ZETIA) 10 mg tablet, Take 10 mg by mouth daily, Disp: , Rfl:     famotidine (PEPCID) 20 mg tablet, Take 20 mg by mouth as needed, Disp: , Rfl:     latanoprost (XALATAN) 0 005 % ophthalmic solution, INSTILL 1 DROP IN EACH EYE AT BEDTIME, Disp: , Rfl:     levothyroxine 50 mcg tablet, TAKE 1 TABLET BY MOUTH EVERY DAY, Disp: 30 tablet, Rfl: 5    pravastatin (PRAVACHOL) 40 mg tablet, Take 40 mg by mouth daily at bedtime  , Disp: , Rfl:     /64 (BP Location: Right arm, Patient Position: Sitting, Cuff Size: Adult)   Pulse 76   Temp 98 2 °F (36 8 °C) (Tympanic)   Resp 18   Ht 5' 2" (1 575 m)   Wt 76 2 kg (168 lb)   BMI 30 73 kg/m²          Physical Exam  Vitals and nursing note reviewed  Constitutional:       Appearance: Normal appearance  HENT:      Head: Normocephalic and atraumatic  Eyes:      Extraocular Movements: Extraocular movements intact  Neck:      Vascular: Carotid bruit present  Cardiovascular:      Heart sounds: Normal heart sounds  Pulmonary:      Effort: Pulmonary effort is normal       Breath sounds: Normal breath sounds  Abdominal:      Palpations: Abdomen is soft  Musculoskeletal:         General: Normal range of motion  Comments: Trace bilateral lower extremity nonpitting edema   Skin:     General: Skin is warm  Neurological:      General: No focal deficit present  Mental Status: She is alert and oriented to person, place, and time     Psychiatric:         Mood and Affect: Mood normal          Behavior: Behavior normal

## 2022-01-14 ENCOUNTER — OFFICE VISIT (OUTPATIENT)
Dept: INTERNAL MEDICINE CLINIC | Facility: CLINIC | Age: 87
End: 2022-01-14
Payer: MEDICARE

## 2022-01-14 VITALS
HEART RATE: 78 BPM | WEIGHT: 168 LBS | TEMPERATURE: 98.1 F | OXYGEN SATURATION: 100 % | SYSTOLIC BLOOD PRESSURE: 128 MMHG | BODY MASS INDEX: 30.73 KG/M2 | DIASTOLIC BLOOD PRESSURE: 78 MMHG

## 2022-01-14 DIAGNOSIS — I35.0 AORTIC VALVE STENOSIS, ETIOLOGY OF CARDIAC VALVE DISEASE UNSPECIFIED: ICD-10-CM

## 2022-01-14 DIAGNOSIS — N18.31 STAGE 3A CHRONIC KIDNEY DISEASE (HCC): ICD-10-CM

## 2022-01-14 DIAGNOSIS — E78.2 MIXED HYPERLIPIDEMIA: ICD-10-CM

## 2022-01-14 DIAGNOSIS — I10 ESSENTIAL HYPERTENSION: Primary | ICD-10-CM

## 2022-01-14 DIAGNOSIS — M15.9 PRIMARY OSTEOARTHRITIS INVOLVING MULTIPLE JOINTS: ICD-10-CM

## 2022-01-14 DIAGNOSIS — I65.23 CAROTID STENOSIS, ASYMPTOMATIC, BILATERAL: ICD-10-CM

## 2022-01-14 DIAGNOSIS — E03.9 ACQUIRED HYPOTHYROIDISM: ICD-10-CM

## 2022-01-14 DIAGNOSIS — R73.9 HYPERGLYCEMIA: ICD-10-CM

## 2022-01-14 DIAGNOSIS — K21.9 GASTROESOPHAGEAL REFLUX DISEASE WITHOUT ESOPHAGITIS: ICD-10-CM

## 2022-01-14 PROCEDURE — 99214 OFFICE O/P EST MOD 30 MIN: CPT | Performed by: INTERNAL MEDICINE

## 2022-01-14 NOTE — ASSESSMENT & PLAN NOTE
Lab Results   Component Value Date    EGFR 46 08/21/2021    EGFR 51 04/03/2021    EGFR 48 10/03/2020    CREATININE 1 09 08/21/2021    CREATININE 0 99 04/03/2021    CREATININE 1 05 10/03/2020   Stable  Advised to maintain hydration will follow renal function

## 2022-01-14 NOTE — ASSESSMENT & PLAN NOTE
Last fasting blood sugar 104  Last hemoglobin A1c 5 4  Advised to watch diet for sugar and carbs intake    Follow blood sugar and hemoglobin A1c

## 2022-01-14 NOTE — PATIENT INSTRUCTIONS
Patient was advised to continue present medications  discussed with the patient medications and laboratory data in detail  Follow-up with me in 3 months or as advised  If any blood test was ordered please do 1 week prior to next appointment unless advise to get earlier    If you have any questions please call the office [phone]

## 2022-01-14 NOTE — ASSESSMENT & PLAN NOTE
Well controlled  Cholesterol 177 triglyceride 60 LDL 91  Advised to continue present dose of pravastatin and low-cholesterol low saturated fat diet  Will follow lipid panel

## 2022-01-14 NOTE — ASSESSMENT & PLAN NOTE
Patient ID: Mr. Castillo is a 75 year old male.    Chief Complaint   Patient presents with   • Follow-up Hypertension;Wellness visit       HPI:pt is feeling well   Has no complaints of cp, sob,n,v,d.  Appetite is good.urination is nl   labs from 3/18 reviewed and were ok   Chol 200 as of 10/18    has been doing more eggs.   Weight is now stable  cut out soda and cut back on carbs     ckd 2  crcl 85, up from 79  he cut down on soda and is drinking more water but not 60 ounces daily     l knee oa  stable  no issues        diabetes  aic 6.2 as of 10/18          bph  urine flow is ok  psa nl  10/18     gerd  on omeprazole  no heartburn, indigestion, bloating. bm are nl     no complaints of cp,sob,n,v,d or prob w urine flow      s/p colonoscopy 4/14   tubular adenoma x 2 removed  f/u in 5 yrs   urine flow is nl    sees outside urologist   gets prostaglandin injections for ED       ALLERGIES:  Allergies no known allergies  Current Outpatient Medications   Medication Sig Dispense Refill   • lisinopril (ZESTRIL) 20 MG tablet TAKE ONE TABLET BY MOUTH ONCE A DAY     • NIFEdipine CC (ADALAT CC) 90 MG 24 hr tablet TAKE ONE TABLET BY MOUTH ONCE DAILY     • omeprazole (PRILOSEC) 20 MG capsule TAKE ONE CAPSULE BY MOUTH TWO TIMES A DAY     • simvastatin (ZOCOR) 10 MG tablet TAKE ONE TABLET BY MOUTH AT BEDTIME       No current facility-administered medications for this visit.      Patient Active Problem List   Diagnosis   • Benign prostatic disease   • Dyslipidemia associated with type 2 diabetes mellitus (CMS/HCC)   • Hyperlipidemia   • Hypertensive kidney disease   • Stage 2 chronic kidney disease   • Osteoarthritis of left knee   • Diabetic nephropathy (CMS/HCC)     No past medical history on file.  Social History     Tobacco Use   Smoking Status Never Smoker   Smokeless Tobacco Never Used     Social History     Substance and Sexual Activity   Drug Use No     Social History     Substance and Sexual Activity   Alcohol Use No   •  Presently stable and asymptomatic  Patient has been followed by vascular surgery  Last vascular Doppler was done April 2021   Patient is going for follow-up Doppler test February 2022 Frequency: Never     No family history on file.  Patient's medications, allergies, past medical, surgical, social and family histories were reviewed and updated as appropriate.  Orders Only on 01/07/2019   Component Date Value Ref Range Status   • COLOR 01/07/2019 YELLOW  YELLOW Final   • APPEARANCE 01/07/2019 CLEAR   Final   • GLUCOSE(URINE) 01/07/2019 NEGATIVE  NEGATIVE mg/dL Final   • BILIRUBIN 01/07/2019 NEGATIVE  NEGATIVE Final   • KETONES 01/07/2019 NEGATIVE  NEGATIVE mg/dL Final   • SPECIFIC GRAVITY 01/07/2019 1.025  1.005 - 1.030 Final   • BLOOD 01/07/2019 NEGATIVE  NEGATIVE Final   • pH 01/07/2019 5.0  5.0 - 7.0 Units Final   • PROTEIN(URINE) 01/07/2019 NEGATIVE  NEGATIVE mg/dL Final   • UROBILINOGEN 01/07/2019 0.2  0.0 - 1.0 mg/dL Final   • NITRITE 01/07/2019 NEGATIVE  NEGATIVE Final   • LEUKOCYTE ESTERASE 01/07/2019 NEGATIVE  NEGATIVE Final   • SPECIMEN TYPE 01/07/2019 URINE, CLEAN CATCH/MIDSTREAM   Final   • MICROALBUMIN, UA (TTL) 01/07/2019 2.04  mg/dL Final   • CREATININE, URINE (TOTAL) 01/07/2019 273.00  mg/dL Final   • MICROALBUMIN/CREATININE 01/07/2019 7.5  <30 mg/g Final   • Hemoglobin A1C 01/07/2019 6.0* 4.5 - 5.6 % Final    Comment:    ----DIABETIC SCREENING---  NON DIABETIC                 <5.7%  INCREASED RISK                5.7-6.4%  DIAGNOSTIC FOR DIABETES      >6.4%     ----DIABETIC CONTROL---     A1C%           eAG mg/dL  6.0            126  6.5            140  7.0            154  7.5            169  8.0            183  8.5            197  9.0            212  9.5            226  10.0           240         Review of Systems   Constitutional: Negative for appetite change, chills, diaphoresis, fatigue, fever and unexpected weight change.   HENT: Negative for congestion, ear discharge, ear pain, facial swelling, mouth sores, nosebleeds, postnasal drip, sinus pressure, sinus pain, sneezing, sore throat, tinnitus and trouble swallowing.    Eyes: Negative for pain, discharge and itching.    Respiratory: Negative for cough, chest tightness, shortness of breath and wheezing.    Cardiovascular: Negative for chest pain, palpitations and leg swelling.   Gastrointestinal: Negative for abdominal pain, blood in stool, constipation, diarrhea, nausea and vomiting.   Genitourinary: Negative for difficulty urinating, dysuria, flank pain, frequency, hematuria and urgency.   Musculoskeletal: Negative for arthralgias, gait problem and joint swelling.   Skin: Negative for rash and wound.   Neurological: Negative for dizziness, syncope, light-headedness, numbness and headaches.   Hematological: Negative for adenopathy. Does not bruise/bleed easily.   Psychiatric/Behavioral: Negative for confusion, decreased concentration, dysphoric mood, hallucinations and sleep disturbance.     Activities of Daily Living Screening    [place]   Flowsheets      02/27/19  1040  Last Filed Value            ADL Screening: Document daily if not Independent   ADL Before Admission Independent Independent   ADL Needs Assist No No   ADL Score 12 (calculated) 12 (calculated)   Short of Breath or Fatigue with ADL's No No   Recent Decline in ADL's No No   Assist Devices   Mobility Assist Devices None None   Are you deaf or do you have serious difficulty  hearing?  No No   Are you blind or do you have serious difficulty seeing, even when wearing glasses? No No   Sensory Support Devices             Functional Assessment    [place]   Flowsheets      02/27/19  1040  Last Filed Value   Cognitive and Functional Status (MU)   Are you deaf or do you have serious difficulty  hearing?  No No   Are you blind or do you have serious difficulty seeing, even when wearing glasses? No No   Because of a physical, mental, or emotional condition, do you have serious difficulty concentrating, remembering or making decisions?  No No   Do you have serious difficulty walking or climbing stairs? No No   Do you have difficulty dressing or bathing? No No   Because  of a physical, mental, or emotional condition, do you have difficulty doing errands alone? No No           PHQ9 Screening Tool    [place]   Flowsheets      02/27/19  0942         PHQ-2/9 Depression Screening Adult   Little interest or pleasure in activity? Not at all   Feeling down, depressed or hopeless? Not at all   Initial depression screening score: 0 (calculated)          Visit Vitals  /76 (BP Location: [facility] [place], Patient Position: Sitting)   Pulse 76   Temp 98 °F (36.7 °C) (Oral)   Resp 18   Ht 5' 11\" (1.803 m)   Wt 73.2 kg (161 lb 7.8 oz)   SpO2 99%   BMI 22.52 kg/m²     Physical Exam   Constitutional: He is oriented to person, place, and time. He appears well-developed and well-nourished. No distress.   HENT:   Head: Normocephalic and atraumatic.   Right Ear: External ear normal.   Left Ear: External ear normal.   Nose: Nose normal.   Mouth/Throat: Oropharynx is clear and moist. No oropharyngeal exudate.   Eyes: Conjunctivae and EOM are normal. Pupils are equal, round, and reactive to light. Left eye exhibits no discharge. No scleral icterus.   Neck: Normal range of motion. Neck supple. No JVD present. No tracheal deviation present. No thyromegaly present.   Cardiovascular: Normal rate, regular rhythm, normal heart sounds and intact distal pulses. Exam reveals no gallop and no friction rub.   No murmur heard.  Pulmonary/Chest: Effort normal and breath sounds normal. No respiratory distress. He has no wheezes. He has no rales. He exhibits no tenderness.   Abdominal: Soft. Bowel sounds are normal. He exhibits no distension and no mass. There is no tenderness. There is no rebound and no guarding.   Musculoskeletal: Normal range of motion. He exhibits no edema, tenderness or deformity.   Lymphadenopathy:     He has no cervical adenopathy.   Neurological: He is alert and oriented to person, place, and time. He has normal reflexes. He displays normal reflexes. No cranial nerve deficit. He exhibits normal  muscle tone. Coordination normal.   Skin: No rash noted. He is not diaphoretic. No erythema.   Psychiatric: He has a normal mood and affect. His behavior is normal. Judgment normal.     Problem List Items Addressed This Visit        Urinary    Benign prostatic disease - Primary    Hypertensive kidney disease    Stage 2 chronic kidney disease       Musculoskeletal    Osteoarthritis of left knee       Endocrine    Dyslipidemia associated with type 2 diabetes mellitus (CMS/HCC)    Hyperlipidemia    Diabetic nephropathy (CMS/HCC)            htn w ckd 2  controlled   2 gm salt diet reiterated     dm   2 w ckd 2  aic 6      ckd 2  crcl 85 better  avoid soda  inc water to 64 ounces      hyperlipidemia   on  simvastatin 10 mg qhs   chol 200  reg exercise advised   was advised to cut back on cheese and fatty meat      gerd   on omeprazole 20 mg qd       ED   stable          f/u in 3 mos  labs   colonoscopy 8/2014 - due 5 yrs 2019  psa nl as of 10/18         Sarabjit Castellon MD

## 2022-01-14 NOTE — ASSESSMENT & PLAN NOTE
She status post bilateral hip replacement  Patient gets sometime pain in the hips and right knee but is not too bad  Occasionally takes Advil discussed with the patient to see orthopedic specialist patient would like to wait

## 2022-01-14 NOTE — PROGRESS NOTES
Assessment/Plan:    1  Essential hypertension  Assessment & Plan:  Blood pressure well controlled  Advised to continue present medication  Advised for low-salt diet  Orders:  -     CBC and differential; Future  -     Comprehensive metabolic panel; Future  -     UA (URINE) with reflex to Scope    2  Acquired hypothyroidism  Assessment & Plan:  Last TSH 1 7  Advised to continue same dose of levothyroxine  Will follow TSH level  Orders:  -     TSH, 3rd generation; Future    3  Gastroesophageal reflux disease without esophagitis  Assessment & Plan:  Symptoms are controlled on famotidine  Patient has been watching diet  4  Mixed hyperlipidemia  Assessment & Plan:  Well controlled  Cholesterol 177 triglyceride 60 LDL 91  Advised to continue present dose of pravastatin and low-cholesterol low saturated fat diet  Will follow lipid panel  Orders:  -     Lipid panel; Future    5  Hyperglycemia  Assessment & Plan:  Last fasting blood sugar 104  Last hemoglobin A1c 5 4  Advised to watch diet for sugar and carbs intake  Follow blood sugar and hemoglobin A1c      Orders:  -     Comprehensive metabolic panel; Future  -     Hemoglobin A1C; Future  -     UA (URINE) with reflex to Scope    6  Carotid stenosis, asymptomatic, bilateral  Assessment & Plan:  Presently stable and asymptomatic  Patient has been followed by vascular surgery  Last vascular Doppler was done April 2021  Patient is going for follow-up Doppler test February 2022       7  Aortic valve stenosis, etiology of cardiac valve disease unspecified  Assessment & Plan:  Presently stable and asymptomatic  Patient has been followed by cardiologist       8  BMI 30 0-30 9,adult  Assessment & Plan:  Patient  was advised to decrease portion size  Advised to decrease carb, sugar, cholesterol intake  Advised to exercise 3-5 times per week  Advised to lose weight        9  Stage 3a chronic kidney disease ([facility] [address] )  Assessment & Plan:  Lab Results Component Value Date    EGFR 46 08/21/2021    EGFR 51 04/03/2021    EGFR 48 10/03/2020    CREATININE 1 09 08/21/2021    CREATININE 0 99 04/03/2021    CREATININE 1 05 10/03/2020   Stable  Advised to maintain hydration will follow renal function  Orders:  -     Comprehensive metabolic panel; Future    10  Primary osteoarthritis involving multiple joints  Assessment & Plan:  She status post bilateral hip replacement  Patient gets sometime pain in the hips and right knee but is not too bad  Occasionally takes Advil discussed with the patient to see orthopedic specialist patient would like to wait  Subjective:  Patient presents for follow-up  Patient ID: Katherine Frank is a 80 y o  female  HPI   15-year-old white female patient presents for follow-up her medical problems  She denies any chest pain, shortness of breath, pain in abdomen  Denies any cough, fever, chills  Denies any nausea, vomiting, diarrhea  She got her COVID-19 vaccination including booster dose  She got her influenza vaccination  Sometimes gets pain in the hips and right knee but not too bad    Occasionally takes Advil, maybe couple times per month    The following portions of the patient's history were reviewed and updated as appropriate:     Past Medical History:  She has a past medical history of Allergic rhinitis, Aortic stenosis, moderate, Bleeding per rectum, Blood glucose elevated, BMI 30 0-30 9,adult (10/14/2021), BMI 32 0-32 9,adult (2/24/2021), [facility] Colonoscopy refused, Conjunctivitis, allergic, Constipation, Disease of thyroid gland, Elbow fracture, GERD (gastroesophageal reflux disease), Herpes zoster, Hyperlipidemia, Hypertension, Medicare annual wellness visit, subsequent (10/14/2021), Osteoarthritis of multiple joints (1/14/2022), Pacemaker, Post herpetic neuralgia, Skin rash (5/26/2021), and Stage 3a chronic kidney disease ([facility] [address] ) (1/14/2022)  ,  _______________________________________________________________________  Past Surgical History:   has a past surgical history that includes Joint replacement; Tubal ligation; Cardiac pacemaker placement; and Total hip arthroplasty (Left, 02/2015)  ,  _______________________________________________________________________  Family History:  family history includes Cancer in her father; Heart disease in her mother ,  _______________________________________________________________________  Social History:   reports that she has never smoked  She has never used smokeless tobacco  She reports that she does not drink alcohol and does not use drugs  ,  _______________________________________________________________________  Allergies:  is allergic to ampicillin, codeine, and penicillins     _______________________________________________________________________  Current Outpatient Medications   Medication Sig Dispense Refill    amLODIPine (NORVASC) 5 mg tablet Take 1 tablet by mouth daily      aspirin 81 mg chewable tablet Chew 81 mg daily      carvedilol (COREG) 6 25 mg tablet Take 6 25 mg by mouth 2 (two) times a day      enalapril (VASOTEC) 20 mg tablet Take 20 mg by mouth 2 (two) times a day      ezetimibe (ZETIA) 10 mg tablet Take 10 mg by mouth daily      famotidine (PEPCID) 20 mg tablet Take 20 mg by mouth as needed      latanoprost (XALATAN) 0 005 % ophthalmic solution INSTILL 1 DROP IN EACH EYE AT BEDTIME      levothyroxine 50 mcg tablet TAKE 1 TABLET BY MOUTH EVERY DAY 30 tablet 5    pravastatin (PRAVACHOL) 40 mg tablet Take 40 mg by mouth daily at bedtime         No current facility-administered medications for this visit      _______________________________________________________________________  Review of Systems   Constitutional: Negative for chills and fever  HENT: Negative for congestion, ear pain, hearing loss, nosebleeds, sinus pain, sore throat and trouble swallowing      Eyes: Negative for discharge, redness and visual disturbance  Respiratory: Negative for cough, chest tightness and shortness of breath  Cardiovascular: Negative for chest pain and palpitations  Gastrointestinal: Negative for abdominal pain, blood in stool, constipation, diarrhea, nausea and vomiting  Genitourinary: Negative for dysuria, flank pain and hematuria  Musculoskeletal: Positive for arthralgias (Pain in hips and right knee)  Negative for myalgias and neck pain  Skin: Negative for color change and rash  Neurological: Negative for dizziness, speech difficulty, weakness and headaches  Hematological: Does not bruise/bleed easily  Psychiatric/Behavioral: Negative for agitation and behavioral problems  Objective:  Vitals:    01/14/22 0856   BP: 128/78   BP Location: Right arm   Patient Position: Sitting   Cuff Size: Adult   Pulse: 78   Temp: 98 1 °F (36 7 °C)   TempSrc: Tympanic   SpO2: 100%   Weight: 76 2 kg (168 lb)     Body mass index is 30 73 kg/m²  Physical Exam  Vitals and nursing note reviewed  Constitutional:       General: She is not in acute distress  Appearance: Normal appearance  She is normal weight  HENT:      Head: Normocephalic and atraumatic  Right Ear: Ear canal and external ear normal       Left Ear: Ear canal and external ear normal       Nose:      Comments: Patient has a face mask on     Mouth/Throat:      Comments: Patient has a face mask on  Eyes:      General: No scleral icterus  Right eye: No discharge  Left eye: No discharge  Extraocular Movements: Extraocular movements intact  Conjunctiva/sclera: Conjunctivae normal    Cardiovascular:      Rate and Rhythm: Normal rate and regular rhythm  Pulses: Normal pulses  Heart sounds: Murmur heard  Pulmonary:      Effort: Pulmonary effort is normal  No respiratory distress  Breath sounds: Normal breath sounds     Abdominal:      General: Bowel sounds are normal  Palpations: Abdomen is soft  Tenderness: There is no abdominal tenderness  Musculoskeletal:         General: Tenderness (Has a mild discomfort in right knee when she ambulates ) present  Normal range of motion  Cervical back: Normal range of motion and neck supple  Right lower leg: No edema  Left lower leg: No edema  Skin:     General: Skin is warm  Findings: No rash  Neurological:      General: No focal deficit present  Mental Status: She is alert and oriented to person, place, and time  Motor: No weakness  Coordination: Coordination normal    Psychiatric:         Mood and Affect: Mood normal          Behavior: Behavior normal          I spent 30 minutes with the patient today    More than 50% time spent for reviewing of external notes, reviewing of the results of diagnostics test, management of care, patient education and ordering of test

## 2022-02-08 ENCOUNTER — HOSPITAL ENCOUNTER (OUTPATIENT)
Dept: NON INVASIVE DIAGNOSTICS | Facility: CLINIC | Age: 87
Discharge: HOME | End: 2022-02-08
Payer: MEDICARE

## 2022-02-08 DIAGNOSIS — I65.23 CAROTID STENOSIS, ASYMPTOMATIC, BILATERAL: ICD-10-CM

## 2022-02-08 PROCEDURE — 93880 EXTRACRANIAL BILAT STUDY: CPT | Performed by: SURGERY

## 2022-02-08 PROCEDURE — 93880 EXTRACRANIAL BILAT STUDY: CPT

## 2022-02-15 ENCOUNTER — TRANSCRIBE ORDERS (OUTPATIENT)
Dept: VASCULAR SURGERY | Facility: CLINIC | Age: 87
End: 2022-02-15

## 2022-02-15 DIAGNOSIS — I65.23 CAROTID STENOSIS, BILATERAL: Primary | ICD-10-CM

## 2022-04-02 ENCOUNTER — APPOINTMENT (OUTPATIENT)
Dept: LAB | Facility: CLINIC | Age: 87
End: 2022-04-02
Payer: MEDICARE

## 2022-04-02 DIAGNOSIS — E03.9 ACQUIRED HYPOTHYROIDISM: ICD-10-CM

## 2022-04-02 DIAGNOSIS — E78.2 MIXED HYPERLIPIDEMIA: ICD-10-CM

## 2022-04-02 DIAGNOSIS — N18.31 STAGE 3A CHRONIC KIDNEY DISEASE (HCC): ICD-10-CM

## 2022-04-02 DIAGNOSIS — I10 ESSENTIAL HYPERTENSION: ICD-10-CM

## 2022-04-02 DIAGNOSIS — R73.9 HYPERGLYCEMIA: ICD-10-CM

## 2022-04-02 LAB
ALBUMIN SERPL BCP-MCNC: 3.7 G/DL (ref 3.5–5)
ALP SERPL-CCNC: 80 U/L (ref 46–116)
ALT SERPL W P-5'-P-CCNC: 27 U/L (ref 12–78)
ANION GAP SERPL CALCULATED.3IONS-SCNC: 8 MMOL/L (ref 4–13)
AST SERPL W P-5'-P-CCNC: 19 U/L (ref 5–45)
BACTERIA UR QL AUTO: NORMAL /HPF
BASOPHILS # BLD AUTO: 0.04 THOUSANDS/ΜL (ref 0–0.1)
BASOPHILS NFR BLD AUTO: 1 % (ref 0–1)
BILIRUB SERPL-MCNC: 0.48 MG/DL (ref 0.2–1)
BILIRUB UR QL STRIP: NEGATIVE
BUN SERPL-MCNC: 22 MG/DL (ref 5–25)
CALCIUM SERPL-MCNC: 9.5 MG/DL (ref 8.3–10.1)
CHLORIDE SERPL-SCNC: 104 MMOL/L (ref 100–108)
CHOLEST SERPL-MCNC: 163 MG/DL
CK SERPL-CCNC: 51 U/L (ref 26–192)
CLARITY UR: CLEAR
CO2 SERPL-SCNC: 25 MMOL/L (ref 21–32)
COLOR UR: YELLOW
CREAT SERPL-MCNC: 1.02 MG/DL (ref 0.6–1.3)
EOSINOPHIL # BLD AUTO: 0.13 THOUSAND/ΜL (ref 0–0.61)
EOSINOPHIL NFR BLD AUTO: 3 % (ref 0–6)
ERYTHROCYTE [DISTWIDTH] IN BLOOD BY AUTOMATED COUNT: 12.4 % (ref 11.6–15.1)
EST. AVERAGE GLUCOSE BLD GHB EST-MCNC: 117 MG/DL
GFR SERPL CREATININE-BSD FRML MDRD: 49 ML/MIN/1.73SQ M
GLUCOSE P FAST SERPL-MCNC: 107 MG/DL (ref 65–99)
GLUCOSE UR STRIP-MCNC: NEGATIVE MG/DL
HBA1C MFR BLD: 5.7 %
HCT VFR BLD AUTO: 39.3 % (ref 34.8–46.1)
HDLC SERPL-MCNC: 72 MG/DL
HGB BLD-MCNC: 12.8 G/DL (ref 11.5–15.4)
HGB UR QL STRIP.AUTO: NEGATIVE
IMM GRANULOCYTES # BLD AUTO: 0.03 THOUSAND/UL (ref 0–0.2)
IMM GRANULOCYTES NFR BLD AUTO: 1 % (ref 0–2)
KETONES UR STRIP-MCNC: NEGATIVE MG/DL
LDLC SERPL CALC-MCNC: 79 MG/DL (ref 0–100)
LEUKOCYTE ESTERASE UR QL STRIP: ABNORMAL
LYMPHOCYTES # BLD AUTO: 1.2 THOUSANDS/ΜL (ref 0.6–4.47)
LYMPHOCYTES NFR BLD AUTO: 24 % (ref 14–44)
MCH RBC QN AUTO: 31.9 PG (ref 26.8–34.3)
MCHC RBC AUTO-ENTMCNC: 32.6 G/DL (ref 31.4–37.4)
MCV RBC AUTO: 98 FL (ref 82–98)
MONOCYTES # BLD AUTO: 0.48 THOUSAND/ΜL (ref 0.17–1.22)
MONOCYTES NFR BLD AUTO: 10 % (ref 4–12)
NEUTROPHILS # BLD AUTO: 3.17 THOUSANDS/ΜL (ref 1.85–7.62)
NEUTS SEG NFR BLD AUTO: 61 % (ref 43–75)
NITRITE UR QL STRIP: NEGATIVE
NON-SQ EPI CELLS URNS QL MICRO: NORMAL /HPF
NONHDLC SERPL-MCNC: 91 MG/DL
NRBC BLD AUTO-RTO: 0 /100 WBCS
PH UR STRIP.AUTO: 6 [PH]
PLATELET # BLD AUTO: 239 THOUSANDS/UL (ref 149–390)
PMV BLD AUTO: 9.5 FL (ref 8.9–12.7)
POTASSIUM SERPL-SCNC: 4.7 MMOL/L (ref 3.5–5.3)
PROT SERPL-MCNC: 7.6 G/DL (ref 6.4–8.2)
PROT UR STRIP-MCNC: NEGATIVE MG/DL
RBC # BLD AUTO: 4.01 MILLION/UL (ref 3.81–5.12)
RBC #/AREA URNS AUTO: NORMAL /HPF
SODIUM SERPL-SCNC: 137 MMOL/L (ref 136–145)
SP GR UR STRIP.AUTO: 1.01 (ref 1–1.03)
TRIGL SERPL-MCNC: 61 MG/DL
TSH SERPL DL<=0.05 MIU/L-ACNC: 1.57 UIU/ML (ref 0.36–3.74)
UROBILINOGEN UR QL STRIP.AUTO: 0.2 E.U./DL
WBC # BLD AUTO: 5.05 THOUSAND/UL (ref 4.31–10.16)
WBC #/AREA URNS AUTO: NORMAL /HPF

## 2022-04-02 PROCEDURE — 85025 COMPLETE CBC W/AUTO DIFF WBC: CPT

## 2022-04-02 PROCEDURE — 82550 ASSAY OF CK (CPK): CPT

## 2022-04-02 PROCEDURE — 36415 COLL VENOUS BLD VENIPUNCTURE: CPT

## 2022-04-02 PROCEDURE — 83036 HEMOGLOBIN GLYCOSYLATED A1C: CPT

## 2022-04-02 PROCEDURE — 84443 ASSAY THYROID STIM HORMONE: CPT

## 2022-04-02 PROCEDURE — 81001 URINALYSIS AUTO W/SCOPE: CPT | Performed by: INTERNAL MEDICINE

## 2022-04-02 PROCEDURE — 80061 LIPID PANEL: CPT

## 2022-04-02 PROCEDURE — 80053 COMPREHEN METABOLIC PANEL: CPT

## 2022-04-04 RX ORDER — LEVOTHYROXINE SODIUM 0.05 MG/1
TABLET ORAL
Qty: 30 TABLET | Refills: 5 | Status: SHIPPED | OUTPATIENT
Start: 2022-04-04

## 2022-04-07 ENCOUNTER — OFFICE VISIT (OUTPATIENT)
Dept: INTERNAL MEDICINE CLINIC | Facility: CLINIC | Age: 87
End: 2022-04-07
Payer: MEDICARE

## 2022-04-07 VITALS
HEART RATE: 73 BPM | TEMPERATURE: 98.9 F | DIASTOLIC BLOOD PRESSURE: 74 MMHG | BODY MASS INDEX: 30.36 KG/M2 | SYSTOLIC BLOOD PRESSURE: 126 MMHG | WEIGHT: 166 LBS | OXYGEN SATURATION: 98 %

## 2022-04-07 DIAGNOSIS — I10 ESSENTIAL HYPERTENSION: Primary | ICD-10-CM

## 2022-04-07 DIAGNOSIS — E03.9 ACQUIRED HYPOTHYROIDISM: ICD-10-CM

## 2022-04-07 DIAGNOSIS — E78.2 MIXED HYPERLIPIDEMIA: ICD-10-CM

## 2022-04-07 DIAGNOSIS — K21.9 GASTROESOPHAGEAL REFLUX DISEASE WITHOUT ESOPHAGITIS: ICD-10-CM

## 2022-04-07 DIAGNOSIS — R73.9 HYPERGLYCEMIA: ICD-10-CM

## 2022-04-07 DIAGNOSIS — N18.31 STAGE 3A CHRONIC KIDNEY DISEASE (HCC): ICD-10-CM

## 2022-04-07 DIAGNOSIS — I35.0 AORTIC VALVE STENOSIS, ETIOLOGY OF CARDIAC VALVE DISEASE UNSPECIFIED: ICD-10-CM

## 2022-04-07 DIAGNOSIS — M15.9 PRIMARY OSTEOARTHRITIS INVOLVING MULTIPLE JOINTS: ICD-10-CM

## 2022-04-07 PROCEDURE — 99214 OFFICE O/P EST MOD 30 MIN: CPT | Performed by: INTERNAL MEDICINE

## 2022-04-07 NOTE — ASSESSMENT & PLAN NOTE
Cholesterol 163, triglyceride 61, HDL 70, LDL 79 so advised to continue same medications  Advised for low-cholesterol low saturated diet

## 2022-04-07 NOTE — PROGRESS NOTES
Assessment/Plan:    1  Essential hypertension  Assessment & Plan:  Blood pressure well controlled  Advised to continue present medication  Advised for low-salt diet  2  Acquired hypothyroidism  Assessment & Plan:  TSH 1 5 so advised to continue same dose of levothyroxine  3  Gastroesophageal reflux disease without esophagitis  Assessment & Plan:  She takes famotidine once or twice a week  Advised to watch diet for spicy foods, caffeinated beverages, alcoholic beverages, soda, citrus fluids and foods  Advised for reflux precautions  4  Aortic valve stenosis, etiology of cardiac valve disease unspecified  Assessment & Plan:  Presently patient asymptomatic and stable  Has been followed by cardiologist Dr Nila Tse  5  Stage 3a chronic kidney disease [facility])  Assessment & Plan:  Lab Results   Component Value Date    EGFR 49 04/02/2022    EGFR 46 08/21/2021    EGFR 51 04/03/2021    CREATININE 1 02 04/02/2022    CREATININE 1 09 08/21/2021    CREATININE 0 99 04/03/2021   Her renal function is stable  Advised to continue present medications  Advised for maintain hydration  6  Mixed hyperlipidemia  Assessment & Plan:  Cholesterol 163, triglyceride 61, HDL 70, LDL 79 so advised to continue same medications  Advised for low-cholesterol low saturated diet  7  Hyperglycemia  Assessment & Plan:  Her fasting blood sugar one hundred seven and hemoglobin A1c 5 7  Advised to watch diet for sugar and carbs intake  8  BMI 30 0-30 9,adult  Assessment & Plan:  Patient  was advised to decrease portion size  Advised to decrease carb, sugar, cholesterol intake  Advised to exercise 3-5 times per week  Advised to lose weight  9  Primary osteoarthritis involving multiple joints  Assessment & Plan:  Patient states sometimes he gets pain in the knees and joints of the fingers but she does not take any medication  BMI Counseling: Body mass index is 30 36 kg/m²   The BMI is above normal  Nutrition recommendations include encouraging healthy choices of fruits and vegetables, decreasing fast food intake, consuming healthier snacks, limiting drinks that contain sugar, moderation in carbohydrate intake, reducing intake of saturated and trans fat and reducing intake of cholesterol  Exercise recommendations include exercising 3-5 times per week  No pharmacotherapy was ordered  Rationale for BMI follow-up plan is due to patient being overweight or obese  she was advised to get 2nd booster dose of COVID-19 vaccination and time after next week  as her last booster dose was December 15 2021    Subjective:  Patient presents for follow-up  Patient ID: Lynn Pastrana is a 80 y o  female  HPI   80-year-old white female patient presents for follow-up her medical problems  She denies any chest pain, shortness of breath, pain in abdomen  Denies any cough, fever, chills  Denies any nausea, vomiting, diarrhea  She got her carotid Doppler study February 2022 and was seen by vascular surgery  No new changes  She was also seen by her cardiologist Dr Madison Hollis last week per patient  Overall she is doing well      The following portions of the patient's history were reviewed and updated as appropriate:     Past Medical History:  She has a past medical history of Allergic rhinitis, Aortic stenosis, moderate, Bleeding per rectum, Blood glucose elevated, BMI 30 0-30 9,adult (10/14/2021), BMI 32 0-32 9,adult (2/24/2021), Carotid arterial disease ([facility] [address] ), Colonoscopy refused, Conjunctivitis, allergic, Constipation, Disease of thyroid gland, Elbow fracture, GERD (gastroesophageal reflux disease), Herpes zoster, Hyperlipidemia, Hypertension, Medicare annual wellness visit, subsequent (10/14/2021), Osteoarthritis of multiple joints (1/14/2022), Pacemaker, Post herpetic neuralgia, Skin rash (5/26/2021), and Stage 3a chronic kidney disease ([address] ) (1/14/2022)  ,  _______________________________________________________________________  Past Surgical History:   has a past surgical history that includes Joint replacement; Tubal ligation; Cardiac pacemaker placement; and Total hip arthroplasty (Left, 02/2015)  ,  _______________________________________________________________________  Family History:  family history includes Cancer in her father; Heart disease in her mother ,  _______________________________________________________________________  Social History:   reports that she has never smoked  She has never used smokeless tobacco  She reports that she does not drink alcohol and does not use drugs  ,  _______________________________________________________________________  Allergies:  is allergic to ampicillin, codeine, and penicillins     _______________________________________________________________________  Current Outpatient Medications   Medication Sig Dispense Refill    amLODIPine (NORVASC) 5 mg tablet Take 1 tablet by mouth daily      aspirin 81 mg chewable tablet Chew 81 mg daily      carvedilol (COREG) 6 25 mg tablet Take 6 25 mg by mouth 2 (two) times a day      enalapril (VASOTEC) 20 mg tablet Take 20 mg by mouth 2 (two) times a day      ezetimibe (ZETIA) 10 mg tablet Take 10 mg by mouth daily      famotidine (PEPCID) 20 mg tablet Take 20 mg by mouth as needed      latanoprost (XALATAN) 0 005 % ophthalmic solution INSTILL 1 DROP IN EACH EYE AT BEDTIME      levothyroxine 50 mcg tablet TAKE 1 TABLET BY MOUTH EVERY DAY 30 tablet 5    pravastatin (PRAVACHOL) 40 mg tablet Take 40 mg by mouth daily at bedtime         No current facility-administered medications for this visit      _______________________________________________________________________  Review of Systems   Constitutional: Negative for chills and fever  HENT: Negative for congestion, ear pain, hearing loss, nosebleeds, sinus pain, sore throat and trouble swallowing      Eyes: Negative for discharge, redness and visual disturbance  Respiratory: Negative for cough, chest tightness and shortness of breath  Cardiovascular: Negative for chest pain and palpitations  Gastrointestinal: Negative for abdominal pain, blood in stool, constipation, diarrhea, nausea and vomiting  Genitourinary: Negative for dysuria, flank pain and hematuria  Musculoskeletal: Positive for arthralgias (Sometimes he gets pain in knees and joints of fingers  )  Negative for myalgias and neck pain  Skin: Negative for color change and rash  Neurological: Negative for dizziness, speech difficulty, weakness and headaches  Hematological: Does not bruise/bleed easily  Psychiatric/Behavioral: Negative for agitation and behavioral problems  Objective:  Vitals:    04/07/22 0901   BP: 126/74   BP Location: Left arm   Patient Position: Sitting   Cuff Size: Adult   Pulse: 73   Temp: 98 9 °F (37 2 °C)   TempSrc: Tympanic   SpO2: 98%   Weight: 75 3 kg (166 lb)     Body mass index is 30 36 kg/m²  Physical Exam  Vitals and nursing note reviewed  Constitutional:       General: She is not in acute distress  Appearance: She is obese  HENT:      Head: Normocephalic and atraumatic  Right Ear: Ear canal and external ear normal       Left Ear: Ear canal and external ear normal    Eyes:      General: No scleral icterus  Right eye: No discharge  Left eye: No discharge  Extraocular Movements: Extraocular movements intact  Conjunctiva/sclera: Conjunctivae normal    Cardiovascular:      Rate and Rhythm: Normal rate and regular rhythm  Pulses: Normal pulses  Heart sounds: Murmur heard  Pulmonary:      Effort: Pulmonary effort is normal  No respiratory distress  Breath sounds: Normal breath sounds  Abdominal:      General: Bowel sounds are normal       Palpations: Abdomen is soft  Tenderness: There is no abdominal tenderness     Musculoskeletal: General: Normal range of motion  Cervical back: Normal range of motion and neck supple  Right lower leg: No edema  Left lower leg: No edema  Skin:     General: Skin is warm  Findings: No rash  Neurological:      General: No focal deficit present  Mental Status: She is alert and oriented to person, place, and time  Motor: No weakness  Coordination: Coordination normal    Psychiatric:         Mood and Affect: Mood normal          Behavior: Behavior normal        I spent 30 minutes with the patient today    More than 50% time spent for reviewing of external notes, reviewing of the results of diagnostics test, management of care, patient education and ordering of test

## 2022-04-07 NOTE — PATIENT INSTRUCTIONS
Patient was advised to continue present medications  discussed with the patient medications and laboratory data in detail  Follow-up with me in 3 months or as advised  If any blood test was ordered please do 1 week prior to next appointment unless advise to get earlier    If you have any questions please call the office [phone]

## 2022-04-07 NOTE — ASSESSMENT & PLAN NOTE
Patient states sometimes he gets pain in the knees and joints of the fingers but she does not take any medication

## 2022-04-07 NOTE — ASSESSMENT & PLAN NOTE
Lab Results   Component Value Date    EGFR 49 04/02/2022    EGFR 46 08/21/2021    EGFR 51 04/03/2021    CREATININE 1 02 04/02/2022    CREATININE 1 09 08/21/2021    CREATININE 0 99 04/03/2021   Her renal function is stable  Advised to continue present medications  Advised for maintain hydration

## 2022-04-07 NOTE — ASSESSMENT & PLAN NOTE
Her fasting blood sugar one hundred seven and hemoglobin A1c 5 7  Advised to watch diet for sugar and carbs intake

## 2022-04-07 NOTE — ASSESSMENT & PLAN NOTE
Presently patient is asymptomatic  Had a carotid artery Doppler February 2022 no changes    Has been followed by vascular surgery

## 2022-04-07 NOTE — ASSESSMENT & PLAN NOTE
She takes famotidine once or twice a week  Advised to watch diet for spicy foods, caffeinated beverages, alcoholic beverages, soda, citrus fluids and foods  Advised for reflux precautions

## 2022-07-15 ENCOUNTER — OFFICE VISIT (OUTPATIENT)
Dept: INTERNAL MEDICINE CLINIC | Facility: CLINIC | Age: 87
End: 2022-07-15
Payer: MEDICARE

## 2022-07-15 VITALS
HEART RATE: 88 BPM | WEIGHT: 167 LBS | DIASTOLIC BLOOD PRESSURE: 78 MMHG | SYSTOLIC BLOOD PRESSURE: 134 MMHG | BODY MASS INDEX: 30.73 KG/M2 | OXYGEN SATURATION: 98 % | RESPIRATION RATE: 16 BRPM | HEIGHT: 62 IN | TEMPERATURE: 97.7 F

## 2022-07-15 DIAGNOSIS — K21.9 GASTROESOPHAGEAL REFLUX DISEASE WITHOUT ESOPHAGITIS: ICD-10-CM

## 2022-07-15 DIAGNOSIS — R73.9 HYPERGLYCEMIA: ICD-10-CM

## 2022-07-15 DIAGNOSIS — Z79.899 HIGH RISK MEDICATION USE: ICD-10-CM

## 2022-07-15 DIAGNOSIS — N18.31 STAGE 3A CHRONIC KIDNEY DISEASE (HCC): ICD-10-CM

## 2022-07-15 DIAGNOSIS — Z95.0 HISTORY OF PERMANENT CARDIAC PACEMAKER PLACEMENT: ICD-10-CM

## 2022-07-15 DIAGNOSIS — E03.9 ACQUIRED HYPOTHYROIDISM: ICD-10-CM

## 2022-07-15 DIAGNOSIS — I35.0 AORTIC VALVE STENOSIS, ETIOLOGY OF CARDIAC VALVE DISEASE UNSPECIFIED: ICD-10-CM

## 2022-07-15 DIAGNOSIS — I65.23 CAROTID STENOSIS, ASYMPTOMATIC, BILATERAL: ICD-10-CM

## 2022-07-15 DIAGNOSIS — I10 ESSENTIAL HYPERTENSION: ICD-10-CM

## 2022-07-15 DIAGNOSIS — E78.2 MIXED HYPERLIPIDEMIA: Primary | ICD-10-CM

## 2022-07-15 PROCEDURE — 99214 OFFICE O/P EST MOD 30 MIN: CPT | Performed by: INTERNAL MEDICINE

## 2022-07-15 RX ORDER — PRAVASTATIN SODIUM 80 MG/1
80 TABLET ORAL EVERY EVENING
COMMUNITY
Start: 2022-05-12 | End: 2022-10-19 | Stop reason: SDUPTHER

## 2022-07-15 RX ORDER — CARVEDILOL 12.5 MG/1
12.5 TABLET ORAL 2 TIMES DAILY WITH MEALS
COMMUNITY
Start: 2022-07-07

## 2022-07-15 NOTE — PROGRESS NOTES
Assessment/Plan:    1  Mixed hyperlipidemia  Assessment & Plan:  Well controlled  Cholesterol 163, triglyceride 61, HDL 70, LDL 79 so advised to continue same dose of pravastatin and Zetia  Advised to continue low-cholesterol low saturated diet    Orders:  -     Lipid panel; Future    2  Essential hypertension  Assessment & Plan:  Blood pressure well controlled  Advised to continue present medication  Advised for low-salt diet  Orders:  -     Comprehensive metabolic panel; Future    3  Gastroesophageal reflux disease without esophagitis  Assessment & Plan:  Her symptoms are controlled on famotidine p r n  depending on the foods she eats  She has been watching diet carefully  4  Acquired hypothyroidism  Assessment & Plan:  TSH 1 57 so advised to continue same dose of levothyroxine  Orders:  -     TSH, 3rd generation; Future    5  Aortic valve stenosis, etiology of cardiac valve disease unspecified  Assessment & Plan:  Presently she is asymptomatic and stable  Has been followed by cardiologist       6  BMI 30 0-30 9,adult  Assessment & Plan:  Patient  was advised to decrease portion size  Advised to decrease carb, sugar, cholesterol intake  Advised to exercise 3-5 times per week  Advised to lose weight  7  Stage 3a chronic kidney disease [facility])  Assessment & Plan:  Lab Results   Component Value Date    EGFR 49 04/02/2022    EGFR 46 08/21/2021    EGFR 51 04/03/2021    CREATININE 1 02 04/02/2022    CREATININE 1 09 08/21/2021    CREATININE 0 99 04/03/2021   Her renal function is stable  Advised to maintain fluid and hydration  Continue present medications  Will follow electrolytes and renal function  Orders:  -     Comprehensive metabolic panel; Future    8  Hyperglycemia  Assessment & Plan:  Her last fasting blood sugar 107 and hemoglobin A1c 5 7  Patient was advised to watch diet for sugar carbs intake    Orders:  -     Comprehensive metabolic panel; Future    9   Carotid stenosis, asymptomatic, bilateral  Assessment & Plan:  Presently she is asymptomatic  Has been followed by vascular surgery and gets carotid artery Doppler  Last Doppler test was done February 2022 revealed 50-69% stenosis on the right side and more than 70% on the left side  10  High risk medication use  -     Comprehensive metabolic panel; Future    11  History of permanent cardiac pacemaker placement  Assessment & Plan:  Patient presently stable and asymptomatic  Has been followed by cardiologist         she was advised to get a 2nd booster dose of COVID-19 vaccination    Subjective:  Patient presents for follow-up  Patient ID: Laron Atkinosn is a 80 y o  female  HPI   80-year-old white female patient presents for follow-up her medical problems  She denies any chest pain, shortness of breath, pain in abdomen  Denies any cough, fever, chills  Denies any nausea, vomiting, diarrhea  She got her COVID-19 vaccination including 1 booster dose  Has been followed by vascular surgery, cardiologist       The following portions of the patient's history were reviewed and updated as appropriate:     Past Medical History:  She has a past medical history of Allergic rhinitis, Aortic stenosis, moderate, Bleeding per rectum, Blood glucose elevated, BMI 30 0-30 9,adult (10/14/2021), BMI 32 0-32 9,adult (2/24/2021), Carotid arterial disease ([facility] [address] ), Colonoscopy refused, Conjunctivitis, allergic, Constipation, Disease of thyroid gland, Elbow fracture, GERD (gastroesophageal reflux disease), Herpes zoster, Hyperlipidemia, Hypertension, Medicare annual wellness visit, subsequent (10/14/2021), Osteoarthritis of multiple joints (1/14/2022), Pacemaker, Post herpetic neuralgia, Skin rash (5/26/2021), and Stage 3a chronic kidney disease ([address] ) (1/14/2022)  ,  _______________________________________________________________________  Past Surgical History:   has a past surgical history that includes Joint replacement; Tubal ligation; Cardiac pacemaker placement; and Total hip arthroplasty (Left, 02/2015)  ,  _______________________________________________________________________  Family History:  family history includes Cancer in her father; Heart disease in her mother ,  _______________________________________________________________________  Social History:   reports that she has never smoked  She has never used smokeless tobacco  She reports that she does not drink alcohol and does not use drugs  ,  _______________________________________________________________________  Allergies:  is allergic to ampicillin, codeine, and penicillins     _______________________________________________________________________  Current Outpatient Medications   Medication Sig Dispense Refill    amLODIPine (NORVASC) 5 mg tablet Take 1 tablet by mouth daily      aspirin 81 mg chewable tablet Chew 81 mg daily      carvedilol (COREG) 12 5 mg tablet Take 12 5 mg by mouth 2 (two) times a day with meals      enalapril (VASOTEC) 20 mg tablet Take 20 mg by mouth 2 (two) times a day      ezetimibe (ZETIA) 10 mg tablet Take 10 mg by mouth daily      famotidine (PEPCID) 20 mg tablet Take 20 mg by mouth as needed      latanoprost (XALATAN) 0 005 % ophthalmic solution INSTILL 1 DROP IN EACH EYE AT BEDTIME      levothyroxine 50 mcg tablet TAKE 1 TABLET BY MOUTH EVERY DAY 30 tablet 5    pravastatin (PRAVACHOL) 40 mg tablet Take 40 mg by mouth daily at bedtime Mon,Friday x2, one ever other day x1      carvedilol (COREG) 6 25 mg tablet Take 6 25 mg by mouth 2 (two) times a day (Patient not taking: Reported on 7/15/2022)      pravastatin (PRAVACHOL) 80 mg tablet Take 80 mg by mouth every evening (Patient not taking: Reported on 7/15/2022)       No current facility-administered medications for this visit      _______________________________________________________________________  Review of Systems   Constitutional: Negative for chills and fever     HENT: Negative for congestion, ear pain, hearing loss, nosebleeds, sinus pain, sore throat and trouble swallowing  Eyes: Negative for discharge, redness and visual disturbance  Respiratory: Negative for cough, chest tightness and shortness of breath  Cardiovascular: Negative for chest pain and palpitations  Gastrointestinal: Negative for abdominal pain, blood in stool, constipation, diarrhea, nausea and vomiting  Genitourinary: Negative for dysuria, flank pain and hematuria  Musculoskeletal: Negative for arthralgias, myalgias and neck pain  Skin: Negative for color change and rash  Neurological: Negative for dizziness, speech difficulty, weakness and headaches  Hematological: Does not bruise/bleed easily  Psychiatric/Behavioral: Negative for agitation and behavioral problems  Objective:  Vitals:    07/15/22 1006   BP: 134/78   Pulse: 88   Resp: 16   Temp: 97 7 °F (36 5 °C)   TempSrc: Temporal   SpO2: 98%   Weight: 75 8 kg (167 lb)   Height: 5' 2" (1 575 m)     Body mass index is 30 54 kg/m²  Physical Exam  Vitals and nursing note reviewed  Constitutional:       General: She is not in acute distress  Appearance: Normal appearance  HENT:      Head: Normocephalic and atraumatic  Right Ear: Ear canal and external ear normal       Left Ear: Ear canal and external ear normal       Nose: Nose normal       Mouth/Throat:      Mouth: Mucous membranes are moist    Eyes:      General: No scleral icterus  Right eye: No discharge  Left eye: No discharge  Extraocular Movements: Extraocular movements intact  Conjunctiva/sclera: Conjunctivae normal    Neck:      Vascular: Carotid bruit (Bilateral) present  Cardiovascular:      Rate and Rhythm: Normal rate and regular rhythm  Pulses: Normal pulses  Heart sounds: Murmur heard  Pulmonary:      Effort: Pulmonary effort is normal  No respiratory distress  Breath sounds: Normal breath sounds     Abdominal: General: Bowel sounds are normal       Palpations: Abdomen is soft  Tenderness: There is no abdominal tenderness  Musculoskeletal:         General: Normal range of motion  Cervical back: Normal range of motion and neck supple  No muscular tenderness  Right lower leg: No edema  Left lower leg: No edema  Comments: She has been ambulating without any assistance   Skin:     General: Skin is warm  Findings: No rash  Neurological:      General: No focal deficit present  Mental Status: She is alert and oriented to person, place, and time  Motor: No weakness  Coordination: Coordination normal    Psychiatric:         Mood and Affect: Mood normal          Behavior: Behavior normal          I spent 30 minutes with the patient today    More than 50% time spent for reviewing of external notes, reviewing of the results of diagnostics test, management of care, patient education and ordering of test

## 2022-07-15 NOTE — PATIENT INSTRUCTIONS
Patient was advised to continue present medications  discussed with the patient medications and laboratory data in detail  Follow-up with me in 3 months or as advised  If any blood test was ordered please do 1 week prior to next appointment unless advise to get earlier    If you have any questions please call the office [phone]

## 2022-07-16 NOTE — ASSESSMENT & PLAN NOTE
Her last fasting blood sugar 107 and hemoglobin A1c 5 7    Patient was advised to watch diet for sugar carbs intake

## 2022-07-16 NOTE — ASSESSMENT & PLAN NOTE
Well controlled  Cholesterol 163, triglyceride 61, HDL 70, LDL 79 so advised to continue same dose of pravastatin and Zetia    Advised to continue low-cholesterol low saturated diet

## 2022-07-16 NOTE — ASSESSMENT & PLAN NOTE
Presently she is asymptomatic  Has been followed by vascular surgery and gets carotid artery Doppler  Last Doppler test was done February 2022 revealed 50-69% stenosis on the right side and more than 70% on the left side

## 2022-07-16 NOTE — ASSESSMENT & PLAN NOTE
Lab Results   Component Value Date    EGFR 49 04/02/2022    EGFR 46 08/21/2021    EGFR 51 04/03/2021    CREATININE 1 02 04/02/2022    CREATININE 1 09 08/21/2021    CREATININE 0 99 04/03/2021   Her renal function is stable  Advised to maintain fluid and hydration  Continue present medications  Will follow electrolytes and renal function

## 2022-07-16 NOTE — ASSESSMENT & PLAN NOTE
Her symptoms are controlled on famotidine p r n  depending on the foods she eats  She has been watching diet carefully

## 2022-08-15 ENCOUNTER — HOSPITAL ENCOUNTER (OUTPATIENT)
Dept: NON INVASIVE DIAGNOSTICS | Facility: CLINIC | Age: 87
Discharge: HOME | End: 2022-08-15
Payer: MEDICARE

## 2022-08-15 DIAGNOSIS — I65.23 CAROTID STENOSIS, BILATERAL: ICD-10-CM

## 2022-08-15 PROCEDURE — 93880 EXTRACRANIAL BILAT STUDY: CPT

## 2022-08-15 PROCEDURE — 93880 EXTRACRANIAL BILAT STUDY: CPT | Performed by: SURGERY

## 2022-10-08 ENCOUNTER — APPOINTMENT (OUTPATIENT)
Dept: LAB | Facility: CLINIC | Age: 87
End: 2022-10-08
Payer: MEDICARE

## 2022-10-08 DIAGNOSIS — E03.9 ACQUIRED HYPOTHYROIDISM: ICD-10-CM

## 2022-10-08 DIAGNOSIS — E78.2 MIXED HYPERLIPIDEMIA: ICD-10-CM

## 2022-10-08 DIAGNOSIS — N18.31 STAGE 3A CHRONIC KIDNEY DISEASE (HCC): ICD-10-CM

## 2022-10-08 DIAGNOSIS — R73.9 HYPERGLYCEMIA: ICD-10-CM

## 2022-10-08 DIAGNOSIS — Z79.899 HIGH RISK MEDICATION USE: ICD-10-CM

## 2022-10-08 DIAGNOSIS — I10 ESSENTIAL HYPERTENSION: ICD-10-CM

## 2022-10-08 LAB
ALBUMIN SERPL BCP-MCNC: 4.1 G/DL (ref 3.5–5)
ALP SERPL-CCNC: 63 U/L (ref 34–104)
ALT SERPL W P-5'-P-CCNC: 16 U/L (ref 7–52)
ANION GAP SERPL CALCULATED.3IONS-SCNC: 7 MMOL/L (ref 4–13)
AST SERPL W P-5'-P-CCNC: 19 U/L (ref 13–39)
BILIRUB SERPL-MCNC: 0.62 MG/DL (ref 0.2–1)
BUN SERPL-MCNC: 17 MG/DL (ref 5–25)
CALCIUM SERPL-MCNC: 9.9 MG/DL (ref 8.4–10.2)
CHLORIDE SERPL-SCNC: 106 MMOL/L (ref 96–108)
CHOLEST SERPL-MCNC: 145 MG/DL
CK SERPL-CCNC: 49 U/L (ref 26–192)
CO2 SERPL-SCNC: 24 MMOL/L (ref 21–32)
CREAT SERPL-MCNC: 1.03 MG/DL (ref 0.6–1.3)
GFR SERPL CREATININE-BSD FRML MDRD: 48 ML/MIN/1.73SQ M
GLUCOSE P FAST SERPL-MCNC: 97 MG/DL (ref 65–99)
HDLC SERPL-MCNC: 59 MG/DL
LDLC SERPL CALC-MCNC: 68 MG/DL (ref 0–100)
NONHDLC SERPL-MCNC: 86 MG/DL
POTASSIUM SERPL-SCNC: 4.5 MMOL/L (ref 3.5–5.3)
PROT SERPL-MCNC: 7.1 G/DL (ref 6.4–8.4)
SODIUM SERPL-SCNC: 137 MMOL/L (ref 135–147)
TRIGL SERPL-MCNC: 88 MG/DL
TSH SERPL DL<=0.05 MIU/L-ACNC: 2.09 UIU/ML (ref 0.45–4.5)

## 2022-10-08 PROCEDURE — 80061 LIPID PANEL: CPT

## 2022-10-08 PROCEDURE — 80053 COMPREHEN METABOLIC PANEL: CPT

## 2022-10-08 PROCEDURE — 36415 COLL VENOUS BLD VENIPUNCTURE: CPT

## 2022-10-08 PROCEDURE — 82550 ASSAY OF CK (CPK): CPT

## 2022-10-08 PROCEDURE — 84443 ASSAY THYROID STIM HORMONE: CPT

## 2022-10-12 PROBLEM — Z00.00 MEDICARE ANNUAL WELLNESS VISIT, SUBSEQUENT: Status: RESOLVED | Noted: 2021-10-14 | Resolved: 2022-10-12

## 2022-10-19 ENCOUNTER — OFFICE VISIT (OUTPATIENT)
Dept: INTERNAL MEDICINE CLINIC | Facility: CLINIC | Age: 87
End: 2022-10-19
Payer: MEDICARE

## 2022-10-19 VITALS
WEIGHT: 169 LBS | SYSTOLIC BLOOD PRESSURE: 128 MMHG | TEMPERATURE: 98.5 F | BODY MASS INDEX: 29.95 KG/M2 | RESPIRATION RATE: 14 BRPM | DIASTOLIC BLOOD PRESSURE: 76 MMHG | HEART RATE: 71 BPM | OXYGEN SATURATION: 98 % | HEIGHT: 63 IN

## 2022-10-19 DIAGNOSIS — I10 ESSENTIAL HYPERTENSION: ICD-10-CM

## 2022-10-19 DIAGNOSIS — N18.31 STAGE 3A CHRONIC KIDNEY DISEASE (HCC): ICD-10-CM

## 2022-10-19 DIAGNOSIS — E03.9 ACQUIRED HYPOTHYROIDISM: ICD-10-CM

## 2022-10-19 DIAGNOSIS — M15.9 PRIMARY OSTEOARTHRITIS INVOLVING MULTIPLE JOINTS: ICD-10-CM

## 2022-10-19 DIAGNOSIS — Z23 NEED FOR PROPHYLACTIC VACCINATION AND INOCULATION AGAINST INFLUENZA: ICD-10-CM

## 2022-10-19 DIAGNOSIS — R73.9 HYPERGLYCEMIA: ICD-10-CM

## 2022-10-19 DIAGNOSIS — I65.23 CAROTID STENOSIS, ASYMPTOMATIC, BILATERAL: ICD-10-CM

## 2022-10-19 DIAGNOSIS — E78.2 MIXED HYPERLIPIDEMIA: ICD-10-CM

## 2022-10-19 DIAGNOSIS — I35.0 AORTIC VALVE STENOSIS, ETIOLOGY OF CARDIAC VALVE DISEASE UNSPECIFIED: ICD-10-CM

## 2022-10-19 DIAGNOSIS — Z00.00 MEDICARE ANNUAL WELLNESS VISIT, SUBSEQUENT: Primary | ICD-10-CM

## 2022-10-19 DIAGNOSIS — K21.9 GASTROESOPHAGEAL REFLUX DISEASE WITHOUT ESOPHAGITIS: ICD-10-CM

## 2022-10-19 PROCEDURE — G0008 ADMIN INFLUENZA VIRUS VAC: HCPCS | Performed by: INTERNAL MEDICINE

## 2022-10-19 PROCEDURE — 99213 OFFICE O/P EST LOW 20 MIN: CPT | Performed by: INTERNAL MEDICINE

## 2022-10-19 PROCEDURE — 90662 IIV NO PRSV INCREASED AG IM: CPT | Performed by: INTERNAL MEDICINE

## 2022-10-19 PROCEDURE — G0439 PPPS, SUBSEQ VISIT: HCPCS | Performed by: INTERNAL MEDICINE

## 2022-10-19 RX ORDER — IBUPROFEN 200 MG
1 TABLET ORAL AS NEEDED
COMMUNITY

## 2022-10-19 NOTE — ASSESSMENT & PLAN NOTE
Patient  was advised to decrease portion size  Advised to decrease carb, sugar, cholesterol intake  Advised to exercise 3-5 times per week as much  as you can do it     Advised to lose weight

## 2022-10-19 NOTE — ASSESSMENT & PLAN NOTE
Lab Results   Component Value Date    EGFR 48 10/08/2022    EGFR 49 04/02/2022    EGFR 46 08/21/2021    CREATININE 1 03 10/08/2022    CREATININE 1 02 04/02/2022    CREATININE 1 09 08/21/2021   Renal function stable  Advised to continue present medication for advised to maintain hydration

## 2022-10-19 NOTE — ASSESSMENT & PLAN NOTE
Patient stable and asymptomatic at present  Patient has been followed by cardiologist Dr Manuel Napoles

## 2022-10-19 NOTE — PROGRESS NOTES
Assessment and Plan:     Problem List Items Addressed This Visit    None          Preventive health issues were discussed with patient, and age appropriate screening tests were ordered as noted in patient's After Visit Summary  Personalized health advice and appropriate referrals for health education or preventive services given if needed, as noted in patient's After Visit Summary       History of Present Illness:     Patient presents for a Medicare Wellness Visit    HPI   Patient Care Team:  Timothy Hubbard MD as PCP - General (Internal Medicine)  MD Ruslan Garcia [place], [place]  Roxana Diaz MD     Review of Systems:     Review of Systems     Problem List:     Patient Active Problem List   Diagnosis   • Spiral fracture of shaft of humerus   • SDH (subdural hematoma)   • Aortic valve stenosis   • Essential hypertension   • Carotid stenosis, asymptomatic, bilateral   • Gastroesophageal reflux disease without esophagitis   • Acquired hypothyroidism   • Primary osteoarthritis involving multiple joints   • Mixed hyperlipidemia   • History of permanent cardiac pacemaker placement   • Hyperglycemia   • BMI 32 0-32 9,adult   • BMI 31 0-31 9,adult   • Skin rash   • BMI 30 0-30 9,adult   • Stage 3a chronic kidney disease ([address] )   • Osteoarthritis of multiple joints      Past Medical and Surgical History:     Past Medical History:   Diagnosis Date   • Allergic rhinitis    • Aortic stenosis, moderate    • Bleeding per rectum    • Blood glucose elevated    • BMI 30 0-30 9,adult 10/14/2021   • BMI 32 0-32 9,adult 2/24/2021   • Carotid arterial disease ([address] )    • Colonoscopy refused    • Conjunctivitis, allergic    • Constipation    • Disease of thyroid gland    • Elbow fracture    • GERD (gastroesophageal reflux disease)    • Herpes zoster    • Hyperlipidemia    • Hypertension    • Medicare annual wellness visit, subsequent 10/14/2021   • Osteoarthritis of multiple joints 1/14/2022   • Pacemaker    • Post herpetic neuralgia    • Skin rash 5/26/2021   • Stage 3a chronic kidney disease ([facility] [address] ) 1/14/2022     Past Surgical History:   Procedure Laterality Date   • CARDIAC PACEMAKER PLACEMENT      ppm   • JOINT REPLACEMENT      both hips   • TOTAL HIP ARTHROPLASTY Left 02/2015   • TUBAL LIGATION        Family History:     Family History   Problem Relation Age of Onset   • Heart disease Mother    • Cancer Father         colon      Social History:     Social History     Socioeconomic History   • Marital status:      Spouse name: None   • Number of children: None   • Years of education: None   • Highest education level: None   Occupational History   • Occupation: Retired    Tobacco Use   • Smoking status: Never Smoker   • Smokeless tobacco: Never Used   Vaping Use   • Vaping Use: Never used   Substance and Sexual Activity   • Alcohol use: No     Comment: Occasional use - As per [employer]   • Drug use: No   • Sexual activity: Not Currently   Other Topics Concern   • None   Social History Narrative    Lives alone    Mammogram: Advised, pt refused    Colonoscopy: advised, pt refused    Annual eye exam: sees q2y ophth    Bone density study: Advised, pt refused    Annual dental checkup: Sees dentist as needed    - As per [employer]      Social Determinants of Health     Financial Resource Strain: Low Risk    • Difficulty of Paying Living Expenses: Not hard at all   Food Insecurity: Not on file   Transportation Needs: No Transportation Needs   • Lack of Transportation (Medical): No   • Lack of Transportation (Non-Medical):  No   Physical Activity: Not on file   Stress: Not on file   Social Connections: Not on file   Intimate Partner Violence: Not on file   Housing Stability: Not on file      Medications and Allergies:     Current Outpatient Medications   Medication Sig Dispense Refill   • amLODIPine (NORVASC) 5 mg tablet Take 1 tablet by mouth daily     • aspirin 81 mg chewable tablet Chew 81 mg daily • carvedilol (COREG) 12 5 mg tablet Take 12 5 mg by mouth 2 (two) times a day with meals     • enalapril (VASOTEC) 20 mg tablet Take 20 mg by mouth 2 (two) times a day     • ezetimibe (ZETIA) 10 mg tablet Take 10 mg by mouth daily     • famotidine (PEPCID) 20 mg tablet Take 20 mg by mouth as needed     • latanoprost (XALATAN) 0 005 % ophthalmic solution INSTILL 1 DROP IN EACH EYE AT BEDTIME     • levothyroxine 50 mcg tablet TAKE 1 TABLET BY MOUTH EVERY DAY 30 tablet 2   • pravastatin (PRAVACHOL) 40 mg tablet Take 40 mg by mouth daily at bedtime Mon,Friday x2, one ever other day x1     • carvedilol (COREG) 6 25 mg tablet Take 6 25 mg by mouth 2 (two) times a day (Patient not taking: No sig reported)     • pravastatin (PRAVACHOL) 80 mg tablet Take 80 mg by mouth every evening (Patient not taking: No sig reported)       No current facility-administered medications for this visit  Allergies   Allergen Reactions   • Ampicillin    • Codeine GI Intolerance     Light headed   • Penicillins GI Intolerance      Immunizations:     Immunization History   Administered Date(s) Administered   • COVID-19 MODERNA VACC 0 25 ML IM BOOSTER 12/15/2021   • COVID-19 MODERNA VACC 0 5 ML IM 01/29/2021, 02/25/2021   • Influenza, high dose seasonal 0 7 mL 10/14/2021      Health Maintenance: There are no preventive care reminders to display for this patient  Topic Date Due   • Pneumococcal Vaccine: 65+ Years (1 - PCV) Never done   • COVID-19 Vaccine (4 - Booster for Moderna series) 04/15/2022   • Influenza Vaccine (1) 09/01/2022      Medicare Screening Tests and Risk Assessments:     Boby Araujo is here for her Subsequent Wellness visit  Last Medicare Wellness visit information reviewed, patient interviewed and updates made to the record today  Health Risk Assessment:   Patient rates overall health as good  Patient feels that their physical health rating is same  Patient is satisfied with their life  Eyesight was rated as same  Hearing was rated as same  Patient feels that their emotional and mental health rating is same  Patients states they are never, rarely angry  Patient states they are never, rarely unusually tired/fatigued  Pain experienced in the last 7 days has been none  Patient states that she has experienced no weight loss or gain in last 6 months  Fall Risk Screening: In the past year, patient has experienced: no history of falling in past year      Urinary Incontinence Screening:   Patient has not leaked urine accidently in the last six months  Home Safety:  Patient has trouble with stairs inside or outside of their home  Patient has working smoke alarms and has working carbon monoxide detector  Home safety hazards include: none  Nutrition:   Current diet is Low Cholesterol, Low Saturated Fat, No Added Salt and Low Carb  Medications:   Patient is not currently taking any over-the-counter supplements  Patient is able to manage medications  Activities of Daily Living (ADLs)/Instrumental Activities of Daily Living (IADLs):   Walk and transfer into and out of bed and chair?: Yes  Dress and groom yourself?: Yes    Bathe or shower yourself?: Yes    Feed yourself? Yes  Do your laundry/housekeeping?: Yes  Manage your money, pay your bills and track your expenses?: Yes  Make your own meals?: Yes    Do your own shopping?: Yes    Previous Hospitalizations:   Any hospitalizations or ED visits within the last 12 months?: No      Advance Care Planning:   Living will: Yes    Durable POA for healthcare:  Yes    Advanced directive: Yes    Advanced directive counseling given: Yes    Five wishes given: Yes    Patient declined ACP directive: No    End of Life Decisions reviewed with patient: Yes      Comments: Her son Leonor Greene and her shannon are her POA    Cognitive Screening:   Provider or family/friend/caregiver concerned regarding cognition?: No    PREVENTIVE SCREENINGS      Cardiovascular Screening:    General: History Lipid Disorder and Screening Current      Diabetes Screening:     General: Screening Current      Colorectal Cancer Screening:     General: Screening Not Indicated      Breast Cancer Screening:     General: Patient Declines      Cervical Cancer Screening:    General: Screening Not Indicated      Osteoporosis Screening:    General: Risks and Benefits Discussed      Abdominal Aortic Aneurysm (AAA) Screening:        General: Screening Not Indicated      Lung Cancer Screening:     General: Screening Not Indicated      Hepatitis C Screening:    General: Screening Current    Screening, Brief Intervention, and Referral to Treatment (SBIRT)    Screening  Typical number of drinks in a day: 0  Typical number of drinks in a week: 0  Interpretation: Low risk drinking behavior  Brief Intervention  Alcohol & drug use screenings were reviewed  No concerns regarding substance use disorder identified  Other Counseling Topics:   Car/seat belt/driving safety, skin self-exam, sunscreen and calcium and vitamin D intake and regular weightbearing exercise   She is not driving    No exam data present     Physical Exam:     /76 (BP Location: Left arm, Patient Position: Sitting, Cuff Size: Adult)   Pulse 71   Temp 98 5 °F (36 9 °C) (Tympanic)   Resp 14   Ht 5' 3" (1 6 m)   Wt 76 7 kg (169 lb)   SpO2 98%   BMI 29 94 kg/m²     Physical Exam     Letty Nelson MD

## 2022-10-19 NOTE — ASSESSMENT & PLAN NOTE
Her symptoms are controlled on famotidine which she takes med  Patient has watching diet closely as well

## 2022-10-19 NOTE — ASSESSMENT & PLAN NOTE
Her fasting blood sugar decreased from 107-97 to advised to continue to watch diet for sugar and carbs intake

## 2022-10-19 NOTE — PATIENT INSTRUCTIONS
Medicare Preventive Visit Patient Instructions  Thank you for completing your Welcome to Medicare Visit or Medicare Annual Wellness Visit today  Your next wellness visit will be due in one year (10/20/2023)  The screening/preventive services that you may require over the next 5-10 years are detailed below  Some tests may not apply to you based off risk factors and/or age  Screening tests ordered at today's visit but not completed yet may show as past due  Also, please note that scanned in results may not display below  Preventive Screenings:  Service Recommendations Previous Testing/Comments   Colorectal Cancer Screening  * Colonoscopy    * Fecal Occult Blood Test (FOBT)/Fecal Immunochemical Test (FIT)  * Fecal DNA/Cologuard Test  * Flexible Sigmoidoscopy Age: 39-70 years old   Colonoscopy: every 10 years (may be performed more frequently if at higher risk)  OR  FOBT/FIT: every 1 year  OR  Cologuard: every 3 years  OR  Sigmoidoscopy: every 5 years  Screening may be recommended earlier than age 39 if at higher risk for colorectal cancer  Also, an individualized decision between you and your healthcare provider will decide whether screening between the ages of 74-80 would be appropriate  Colonoscopy: Not on file  FOBT/FIT: Not on file  Cologuard: Not on file  Sigmoidoscopy: Not on file    Screening Not Indicated     Breast Cancer Screening Age: 36 years old  Frequency: every 1-2 years  Not required if history of left and right mastectomy Mammogram: Not on file        Cervical Cancer Screening Between the ages of 21-29, pap smear recommended once every 3 years  Between the ages of 33-67, can perform pap smear with HPV co-testing every 5 years     Recommendations may differ for women with a history of total hysterectomy, cervical cancer, or abnormal pap smears in past  Pap Smear: Not on file    Screening Not Indicated   Hepatitis C Screening Once for adults born between 1945 and 1965  More frequently in patients at high risk for Hepatitis C Hep C Antibody: Not on file        Diabetes Screening 1-2 times per year if you're at risk for diabetes or have pre-diabetes Fasting glucose: 97 mg/dL (10/8/2022)  A1C: 5 7 % (4/2/2022)  Screening Current   Cholesterol Screening Once every 5 years if you don't have a lipid disorder  May order more often based on risk factors  Lipid panel: 10/08/2022    Screening Not Indicated  History Lipid Disorder     Other Preventive Screenings Covered by Medicare:  Abdominal Aortic Aneurysm (AAA) Screening: covered once if your at risk  You're considered to be at risk if you have a family history of AAA  Lung Cancer Screening: covers low dose CT scan once per year if you meet all of the following conditions: (1) Age 50-69; (2) No signs or symptoms of lung cancer; (3) Current smoker or have quit smoking within the last 15 years; (4) You have a tobacco smoking history of at least 20 pack years (packs per day multiplied by number of years you smoked); (5) You get a written order from a healthcare provider  Glaucoma Screening: covered annually if you're considered high risk: (1) You have diabetes OR (2) Family history of glaucoma OR (3)  aged 48 and older OR (3)  American aged 72 and older  Osteoporosis Screening: covered every 2 years if you meet one of the following conditions: (1) You're estrogen deficient and at risk for osteoporosis based off medical history and other findings; (2) Have a vertebral abnormality; (3) On glucocorticoid therapy for more than 3 months; (4) Have primary hyperparathyroidism; (5) On osteoporosis medications and need to assess response to drug therapy  Last bone density test (DXA Scan): Not on file  HIV Screening: covered annually if you're between the age of 12-76  Also covered annually if you are younger than 13 and older than 72 with risk factors for HIV infection   For pregnant patients, it is covered up to 3 times per pregnancy  Immunizations:  Immunization Recommendations   Influenza Vaccine Annual influenza vaccination during flu season is recommended for all persons aged >= 6 months who do not have contraindications   Pneumococcal Vaccine   * Pneumococcal conjugate vaccine = PCV13 (Prevnar 13), PCV15 (Vaxneuvance), PCV20 (Prevnar 20)  * Pneumococcal polysaccharide vaccine = PPSV23 (Pneumovax) Adults 25-60 years old: 1-3 doses may be recommended based on certain risk factors  Adults 72 years old: 1-2 doses may be recommended based off what pneumonia vaccine you previously received   Hepatitis B Vaccine 3 dose series if at intermediate or high risk (ex: diabetes, end stage renal disease, liver disease)   Tetanus (Td) Vaccine - COST NOT COVERED BY MEDICARE PART B Following completion of primary series, a booster dose should be given every 10 years to maintain immunity against tetanus  Td may also be given as tetanus wound prophylaxis  Tdap Vaccine - COST NOT COVERED BY MEDICARE PART B Recommended at least once for all adults  For pregnant patients, recommended with each pregnancy  Shingles Vaccine (Shingrix) - COST NOT COVERED BY MEDICARE PART B  2 shot series recommended in those aged 48 and above     Health Maintenance Due:  There are no preventive care reminders to display for this patient  Immunizations Due:      Topic Date Due    Pneumococcal Vaccine: 65+ Years (1 - PCV) Never done    COVID-19 Vaccine (4 - Booster for Moderna series) 04/15/2022    Influenza Vaccine (1) 09/01/2022     Advance Directives   What are advance directives? Advance directives are legal documents that state your wishes and plans for medical care  These plans are made ahead of time in case you lose your ability to make decisions for yourself  Advance directives can apply to any medical decision, such as the treatments you want, and if you want to donate organs  What are the types of advance directives?   There are many types of advance directives, and each state has rules about how to use them  You may choose a combination of any of the following:  Living will: This is a written record of the treatment you want  You can also choose which treatments you do not want, which to limit, and which to stop at a certain time  This includes surgery, medicine, IV fluid, and tube feedings  Durable power of  for [facility]): This is a written record that states who you want to make healthcare choices for you when you are unable to make them for yourself  This person, called a proxy, is usually a family member or a friend  You may choose more than 1 proxy  Do not resuscitate (DNR) order:  A DNR order is used in case your heart stops beating or you stop breathing  It is a request not to have certain forms of treatment, such as CPR  A DNR order may be included in other types of advance directives  Medical directive: This covers the care that you want if you are in a coma, near death, or unable to make decisions for yourself  You can list the treatments you want for each condition  Treatment may include pain medicine, surgery, blood transfusions, dialysis, IV or tube feedings, and a ventilator (breathing machine)  Values history: This document has questions about your views, beliefs, and how you feel and think about life  This information can help others choose the care that you would choose  Why are advance directives important? An advance directive helps you control your care  Although spoken wishes may be used, it is better to have your wishes written down  Spoken wishes can be misunderstood, or not followed  Treatments may be given even if you do not want them  An advance directive may make it easier for your family to make difficult choices about your care     Weight Management   Why it is important to manage your weight:  Being overweight increases your risk of health conditions such as heart disease, high blood pressure, type 2 diabetes, and certain types of cancer  It can also increase your risk for osteoarthritis, sleep apnea, and other respiratory problems  Aim for a slow, steady weight loss  Even a small amount of weight loss can lower your risk of health problems  How to lose weight safely:  A safe and healthy way to lose weight is to eat fewer calories and get regular exercise  You can lose up about 1 pound a week by decreasing the number of calories you eat by 500 calories each day  Healthy meal plan for weight management:  A healthy meal plan includes a variety of foods, contains fewer calories, and helps you stay healthy  A healthy meal plan includes the following:  Eat whole-grain foods more often  A healthy meal plan should contain fiber  Fiber is the part of grains, fruits, and vegetables that is not broken down by your body  Whole-grain foods are healthy and provide extra fiber in your diet  Some examples of whole-grain foods are whole-wheat breads and pastas, oatmeal, brown rice, and bulgur  Eat a variety of vegetables every day  Include dark, leafy greens such as spinach, kale, belinda greens, and mustard greens  Eat yellow and orange vegetables such as carrots, sweet potatoes, and winter squash  Eat a variety of fruits every day  Choose fresh or canned fruit (canned in its own juice or light syrup) instead of juice  Fruit juice has very little or no fiber  Eat low-fat dairy foods  Drink fat-free (skim) milk or 1% milk  Eat fat-free yogurt and low-fat cottage cheese  Try low-fat cheeses such as mozzarella and other reduced-fat cheeses  Choose meat and other protein foods that are low in fat  Choose beans or other legumes such as split peas or lentils  Choose fish, skinless poultry (chicken or turkey), or lean cuts of red meat (beef or pork)  Before you cook meat or poultry, cut off any visible fat  Use less fat and oil  Try baking foods instead of frying them   Add less fat, such as margarine, sour cream, regular salad dressing and mayonnaise to foods  Eat fewer high-fat foods  Some examples of high-fat foods include french fries, doughnuts, ice cream, and cakes  Eat fewer sweets  Limit foods and drinks that are high in sugar  This includes candy, cookies, regular soda, and sweetened drinks  Exercise:  Exercise at least 30 minutes per day on most days of the week  Some examples of exercise include walking, biking, dancing, and swimming  You can also fit in more physical activity by taking the stairs instead of the elevator or parking farther away from stores  Ask your healthcare provider about the best exercise plan for you  © Copyright [employer] 2018 Information is for End User's use only and may not be sold, redistributed or otherwise used for commercial purposes  All illustrations and images included in CareNotes® are the copyrighted property of A D [employer] M [employer]  or [address] [place] [address] [facility]  Patient was advised to continue present medications  discussed with the patient medications and laboratory data in detail  Follow-up with me in 3 months or as advised  If any blood test was ordered please do 1 week prior to next appointment unless advise to get earlier    If you have any questions please call the office [phone]

## 2022-10-19 NOTE — PROGRESS NOTES
Assessment/Plan:    1  Medicare annual wellness visit, subsequent    2  Essential hypertension  Assessment & Plan:  Blood pressure well controlled  Advised to continue present medication  Advised for low-salt diet  3  Acquired hypothyroidism  Assessment & Plan:  TSH 2 08 so advised to continue same dose of levothyroxine  4  Gastroesophageal reflux disease without esophagitis  Assessment & Plan:  Her symptoms are controlled on famotidine which she takes med  Patient has watching diet closely as well  5  Carotid stenosis, asymptomatic, bilateral  Assessment & Plan:  Patient presently asymptomatic and stable  Baby aspirin  Has been followed by vascular surgery  Last carotid Doppler was done August 15, 2022 no new changes  6  Aortic valve stenosis, etiology of cardiac valve disease unspecified  Assessment & Plan:  Patient stable and asymptomatic at present  Patient has been followed by cardiologist Dr Trevor Patrick  7  Mixed hyperlipidemia  Assessment & Plan:  Cholesterol 145, triglyceride 88, HDL 59, LDL 68  Patient is on a pravastatin 40 mg daily  Patient states she was not able to tolerate 80 mg so now she takes 40 mg daily  Advised to continue to watch diet for cholesterol saturated        8  BMI 29 0-29 9,adult  Assessment & Plan:  Patient  was advised to decrease portion size  Advised to decrease carb, sugar, cholesterol intake  Advised to exercise 3-5 times per week as much  as you can do it     Advised to lose weight  9  Stage 3a chronic kidney disease [facility])  Assessment & Plan:  Lab Results   Component Value Date    EGFR 48 10/08/2022    EGFR 49 04/02/2022    EGFR 46 08/21/2021    CREATININE 1 03 10/08/2022    CREATININE 1 02 04/02/2022    CREATININE 1 09 08/21/2021   Renal function stable  Advised to continue present medication for advised to maintain hydration        10  Primary osteoarthritis involving multiple joints  Assessment & Plan:  She gets pain in the knees and hips particularly when she goes up stairs  She does not have stairs at home  Occasionally she takes 1  Advil maybe once or twice a month only  Discussed with the patient to see the specialist but patient states its  not too bad  11  Need for prophylactic vaccination and inoculation against influenza  -     influenza vaccine, high-dose, PF 0 7 mL (FLUZONE HIGH-DOSE)    12  Hyperglycemia  Assessment & Plan:  Her fasting blood sugar decreased from 107-97 to advised to continue to watch diet for sugar and carbs intake  discussed with the patient about getting COVID-19 updated booster vaccination  Subjective:  Patient presents for annual wellness exam well as follow-up of her medical problems  Patient ID: Lina Iqbal is a 80 y o  female  HPI   77-year-old white female patient presents for annual wellness exam as well as follow-up of her medical problems  She denies any chest pain shortness of breath, pain in abdomen denies any cough, fever, chills denies nausea vomiting, diarrhea  The following portions of the patient's history were reviewed and updated as appropriate:     Past Medical History:  She has a past medical history of Allergic rhinitis, Aortic stenosis, moderate, Bleeding per rectum, Blood glucose elevated, BMI 29 0-29 9,adult (10/19/2022), BMI 30 0-30 9,adult (10/14/2021), BMI 32 0-32 9,adult (2/24/2021), Carotid arterial disease ([facility] [address] ), Colonoscopy refused, Conjunctivitis, allergic, Constipation, Disease of thyroid gland, Elbow fracture, GERD (gastroesophageal reflux disease), Herpes zoster, Hyperlipidemia, Hypertension, Medicare annual wellness visit, subsequent (10/14/2021), Medicare annual wellness visit, subsequent (10/19/2022), Osteoarthritis of multiple joints (1/14/2022), Pacemaker, Post herpetic neuralgia, Skin rash (5/26/2021), and Stage 3a chronic kidney disease ([address] ) (1/14/2022)  ,  _______________________________________________________________________  Past Surgical History:   has a past surgical history that includes Joint replacement; Tubal ligation; Cardiac pacemaker placement; and Total hip arthroplasty (Left, 02/2015)  ,  _______________________________________________________________________  Family History:  family history includes Cancer in her father; Heart disease in her mother ,  _______________________________________________________________________  Social History:   reports that she has never smoked  She has never used smokeless tobacco  She reports that she does not drink alcohol and does not use drugs  ,  _______________________________________________________________________  Allergies:  is allergic to ampicillin, codeine, and penicillins     _______________________________________________________________________  Current Outpatient Medications   Medication Sig Dispense Refill   • amLODIPine (NORVASC) 5 mg tablet Take 1 tablet by mouth daily     • aspirin 81 mg chewable tablet Chew 81 mg daily     • carvedilol (COREG) 12 5 mg tablet Take 12 5 mg by mouth 2 (two) times a day with meals     • enalapril (VASOTEC) 20 mg tablet Take 20 mg by mouth 2 (two) times a day     • ezetimibe (ZETIA) 10 mg tablet Take 10 mg by mouth daily     • famotidine (PEPCID) 20 mg tablet Take 20 mg by mouth as needed     • ibuprofen (MOTRIN) 200 mg tablet Take 1 tablet by mouth as needed She occasionally takes Advil maybe once or twice per month at the most     • latanoprost (XALATAN) 0 005 % ophthalmic solution INSTILL 1 DROP IN [facility] EYE AT BEDTIME     • levothyroxine 50 mcg tablet TAKE 1 TABLET BY MOUTH EVERY DAY 30 tablet 2   • pravastatin (PRAVACHOL) 40 mg tablet Take 40 mg by mouth daily at bedtime Mon,Friday x2, one ever other day x1       No current facility-administered medications for this visit      _______________________________________________________________________  Review of Systems   Constitutional: Negative for chills and fever     HENT: Negative for congestion, ear pain, hearing loss, nosebleeds, sinus pain, sore throat and trouble swallowing  Eyes: Negative for discharge, redness and visual disturbance  Respiratory: Negative for cough, chest tightness and shortness of breath  Cardiovascular: Negative for chest pain and palpitations  Gastrointestinal: Negative for abdominal pain, blood in stool, constipation, diarrhea, nausea and vomiting  Genitourinary: Negative for dysuria, flank pain, frequency and hematuria  Musculoskeletal: Positive for arthralgias (Pain in the knees and hips particularly when she goes up stairs  )  Negative for myalgias and neck pain  Skin: Negative for color change and rash  Neurological: Negative for dizziness, speech difficulty, weakness and headaches  Hematological: Does not bruise/bleed easily  Psychiatric/Behavioral: Negative for agitation and behavioral problems  Objective:  Vitals:    10/19/22 0830   BP: 128/76   BP Location: Left arm   Patient Position: Sitting   Cuff Size: Adult   Pulse: 71   Resp: 14   Temp: 98 5 °F (36 9 °C)   TempSrc: Tympanic   SpO2: 98%   Weight: 76 7 kg (169 lb)   Height: 5' 3" (1 6 m)     Body mass index is 29 94 kg/m²  Physical Exam  Vitals and nursing note reviewed  Constitutional:       General: She is not in acute distress  Appearance: Normal appearance  HENT:      Head: Normocephalic and atraumatic  Right Ear: Tympanic membrane, ear canal and external ear normal       Left Ear: Tympanic membrane, ear canal and external ear normal       Nose: Nose normal       Mouth/Throat:      Mouth: Mucous membranes are moist    Eyes:      General: No scleral icterus  Right eye: No discharge  Left eye: No discharge  Extraocular Movements: Extraocular movements intact  Conjunctiva/sclera: Conjunctivae normal    Cardiovascular:      Rate and Rhythm: Normal rate and regular rhythm  Pulses: Normal pulses  Heart sounds: Murmur heard     Pulmonary:      Effort: Pulmonary effort is normal  No respiratory distress  Breath sounds: Normal breath sounds  Abdominal:      General: Bowel sounds are normal       Palpations: Abdomen is soft  Tenderness: There is no abdominal tenderness  Musculoskeletal:         General: Normal range of motion  Cervical back: Normal range of motion and neck supple  No muscular tenderness  Right lower leg: No edema  Left lower leg: No edema  Comments: She is ambulating without any assistance or  cane   Skin:     General: Skin is warm  Findings: No rash  Neurological:      General: No focal deficit present  Mental Status: She is alert and oriented to person, place, and time  Motor: No weakness        Coordination: Coordination normal    Psychiatric:         Mood and Affect: Mood normal          Behavior: Behavior normal

## 2022-10-19 NOTE — ASSESSMENT & PLAN NOTE
Patient presently asymptomatic and stable  Baby aspirin  Has been followed by vascular surgery  Last carotid Doppler was done August 15, 2022 no new changes

## 2022-10-19 NOTE — ASSESSMENT & PLAN NOTE
Cholesterol 145, triglyceride 88, HDL 59, LDL 68  Patient is on a pravastatin 40 mg daily  Patient states she was not able to tolerate 80 mg so now she takes 40 mg daily    Advised to continue to watch diet for cholesterol saturated

## 2022-10-19 NOTE — ASSESSMENT & PLAN NOTE
She gets pain in the knees and hips particularly when she goes up stairs  She does not have stairs at home  Occasionally she takes 1  Advil maybe once or twice a month only  Discussed with the patient to see the specialist but patient states its  not too bad

## 2022-11-02 ENCOUNTER — HOSPITAL ENCOUNTER (INPATIENT)
Facility: HOSPITAL | Age: 87
LOS: 1 days | Discharge: HOME | End: 2022-11-04
Attending: EMERGENCY MEDICINE | Admitting: STUDENT IN AN ORGANIZED HEALTH CARE EDUCATION/TRAINING PROGRAM

## 2022-11-02 ENCOUNTER — APPOINTMENT (EMERGENCY)
Dept: RADIOLOGY | Facility: HOSPITAL | Age: 87
End: 2022-11-02

## 2022-11-02 ENCOUNTER — APPOINTMENT (EMERGENCY)
Dept: CT IMAGING | Facility: HOSPITAL | Age: 87
End: 2022-11-02

## 2022-11-02 DIAGNOSIS — R11.2 NAUSEA AND VOMITING, UNSPECIFIED VOMITING TYPE: Primary | ICD-10-CM

## 2022-11-02 DIAGNOSIS — I10 ESSENTIAL HYPERTENSION: ICD-10-CM

## 2022-11-02 DIAGNOSIS — T68.XXXA HYPOTHERMIA, INITIAL ENCOUNTER: ICD-10-CM

## 2022-11-02 PROBLEM — R42 DIZZINESS: Status: ACTIVE | Noted: 2022-11-02

## 2022-11-02 LAB
2HR DELTA HS TROPONIN: 1 NG/L
ALBUMIN SERPL BCP-MCNC: 4.4 G/DL (ref 3.5–5)
ALP SERPL-CCNC: 68 U/L (ref 34–104)
ALT SERPL W P-5'-P-CCNC: 16 U/L (ref 7–52)
ANION GAP SERPL CALCULATED.3IONS-SCNC: 11 MMOL/L (ref 4–13)
AST SERPL W P-5'-P-CCNC: 21 U/L (ref 13–39)
BASOPHILS # BLD AUTO: 0.04 THOUSANDS/ÂΜL (ref 0–0.1)
BASOPHILS NFR BLD AUTO: 1 % (ref 0–1)
BILIRUB SERPL-MCNC: 0.54 MG/DL (ref 0.2–1)
BILIRUB UR QL STRIP: NEGATIVE
BUN SERPL-MCNC: 24 MG/DL (ref 5–25)
CALCIUM SERPL-MCNC: 10.4 MG/DL (ref 8.4–10.2)
CARDIAC TROPONIN I PNL SERPL HS: 5 NG/L
CARDIAC TROPONIN I PNL SERPL HS: 6 NG/L
CHLORIDE SERPL-SCNC: 103 MMOL/L (ref 96–108)
CLARITY UR: CLEAR
CO2 SERPL-SCNC: 20 MMOL/L (ref 21–32)
COLOR UR: YELLOW
CREAT SERPL-MCNC: 1.08 MG/DL (ref 0.6–1.3)
EOSINOPHIL # BLD AUTO: 0.28 THOUSAND/ÂΜL (ref 0–0.61)
EOSINOPHIL NFR BLD AUTO: 4 % (ref 0–6)
ERYTHROCYTE [DISTWIDTH] IN BLOOD BY AUTOMATED COUNT: 12.5 % (ref 11.6–15.1)
FLUAV RNA RESP QL NAA+PROBE: NEGATIVE
FLUBV RNA RESP QL NAA+PROBE: NEGATIVE
GFR SERPL CREATININE-BSD FRML MDRD: 45 ML/MIN/1.73SQ M
GLUCOSE SERPL-MCNC: 164 MG/DL (ref 65–140)
GLUCOSE UR STRIP-MCNC: NEGATIVE MG/DL
HCT VFR BLD AUTO: 37.9 % (ref 34.8–46.1)
HGB BLD-MCNC: 13.1 G/DL (ref 11.5–15.4)
HGB UR QL STRIP.AUTO: NEGATIVE
IMM GRANULOCYTES # BLD AUTO: 0.05 THOUSAND/UL (ref 0–0.2)
IMM GRANULOCYTES NFR BLD AUTO: 1 % (ref 0–2)
KETONES UR STRIP-MCNC: ABNORMAL MG/DL
LACTATE SERPL-SCNC: 0.8 MMOL/L (ref 0.5–2)
LEUKOCYTE ESTERASE UR QL STRIP: NEGATIVE
LIPASE SERPL-CCNC: 27 U/L (ref 11–82)
LYMPHOCYTES # BLD AUTO: 1.03 THOUSANDS/ÂΜL (ref 0.6–4.47)
LYMPHOCYTES NFR BLD AUTO: 14 % (ref 14–44)
MCH RBC QN AUTO: 32.4 PG (ref 26.8–34.3)
MCHC RBC AUTO-ENTMCNC: 34.6 G/DL (ref 31.4–37.4)
MCV RBC AUTO: 94 FL (ref 82–98)
MONOCYTES # BLD AUTO: 0.46 THOUSAND/ÂΜL (ref 0.17–1.22)
MONOCYTES NFR BLD AUTO: 6 % (ref 4–12)
NEUTROPHILS # BLD AUTO: 5.48 THOUSANDS/ÂΜL (ref 1.85–7.62)
NEUTS SEG NFR BLD AUTO: 74 % (ref 43–75)
NITRITE UR QL STRIP: NEGATIVE
NRBC BLD AUTO-RTO: 0 /100 WBCS
PH UR STRIP.AUTO: 6 [PH]
PLATELET # BLD AUTO: 221 THOUSANDS/UL (ref 149–390)
PMV BLD AUTO: 9.3 FL (ref 8.9–12.7)
POTASSIUM SERPL-SCNC: 4.1 MMOL/L (ref 3.5–5.3)
PROCALCITONIN SERPL-MCNC: <0.05 NG/ML
PROT SERPL-MCNC: 7.8 G/DL (ref 6.4–8.4)
PROT UR STRIP-MCNC: NEGATIVE MG/DL
RBC # BLD AUTO: 4.04 MILLION/UL (ref 3.81–5.12)
RSV RNA RESP QL NAA+PROBE: NEGATIVE
SARS-COV-2 RNA RESP QL NAA+PROBE: NEGATIVE
SODIUM SERPL-SCNC: 134 MMOL/L (ref 135–147)
SP GR UR STRIP.AUTO: 1.02 (ref 1–1.03)
TSH SERPL DL<=0.05 MIU/L-ACNC: 2.79 UIU/ML (ref 0.45–4.5)
UROBILINOGEN UR QL STRIP.AUTO: 0.2 E.U./DL
WBC # BLD AUTO: 7.34 THOUSAND/UL (ref 4.31–10.16)

## 2022-11-02 RX ORDER — ONDANSETRON 2 MG/ML
4 INJECTION INTRAMUSCULAR; INTRAVENOUS ONCE
Status: COMPLETED | OUTPATIENT
Start: 2022-11-02 | End: 2022-11-02

## 2022-11-02 RX ORDER — HEPARIN SODIUM 5000 [USP'U]/ML
5000 INJECTION, SOLUTION INTRAVENOUS; SUBCUTANEOUS EVERY 8 HOURS SCHEDULED
Status: DISCONTINUED | OUTPATIENT
Start: 2022-11-02 | End: 2022-11-04 | Stop reason: HOSPADM

## 2022-11-02 RX ORDER — EZETIMIBE 10 MG/1
10 TABLET ORAL DAILY
Status: DISCONTINUED | OUTPATIENT
Start: 2022-11-03 | End: 2022-11-04 | Stop reason: HOSPADM

## 2022-11-02 RX ORDER — METOCLOPRAMIDE HYDROCHLORIDE 5 MG/ML
10 INJECTION INTRAMUSCULAR; INTRAVENOUS ONCE
Status: COMPLETED | OUTPATIENT
Start: 2022-11-02 | End: 2022-11-02

## 2022-11-02 RX ORDER — AMLODIPINE BESYLATE 5 MG/1
5 TABLET ORAL DAILY
Status: DISCONTINUED | OUTPATIENT
Start: 2022-11-03 | End: 2022-11-03

## 2022-11-02 RX ORDER — PANTOPRAZOLE SODIUM 40 MG/1
40 TABLET, DELAYED RELEASE ORAL
Status: DISCONTINUED | OUTPATIENT
Start: 2022-11-03 | End: 2022-11-04 | Stop reason: HOSPADM

## 2022-11-02 RX ORDER — SODIUM CHLORIDE, SODIUM LACTATE, POTASSIUM CHLORIDE, CALCIUM CHLORIDE 600; 310; 30; 20 MG/100ML; MG/100ML; MG/100ML; MG/100ML
50 INJECTION, SOLUTION INTRAVENOUS CONTINUOUS
Status: DISCONTINUED | OUTPATIENT
Start: 2022-11-02 | End: 2022-11-03

## 2022-11-02 RX ORDER — ONDANSETRON 2 MG/ML
4 INJECTION INTRAMUSCULAR; INTRAVENOUS ONCE
Status: COMPLETED | OUTPATIENT
Start: 2022-11-02 | End: 2022-11-03

## 2022-11-02 RX ORDER — PRAVASTATIN SODIUM 40 MG
40 TABLET ORAL
Status: DISCONTINUED | OUTPATIENT
Start: 2022-11-02 | End: 2022-11-04 | Stop reason: HOSPADM

## 2022-11-02 RX ORDER — CARVEDILOL 12.5 MG/1
12.5 TABLET ORAL 2 TIMES DAILY WITH MEALS
Status: DISCONTINUED | OUTPATIENT
Start: 2022-11-03 | End: 2022-11-03

## 2022-11-02 RX ORDER — ACETAMINOPHEN 325 MG/1
650 TABLET ORAL EVERY 6 HOURS PRN
Status: DISCONTINUED | OUTPATIENT
Start: 2022-11-02 | End: 2022-11-04 | Stop reason: HOSPADM

## 2022-11-02 RX ORDER — ASPIRIN 81 MG/1
81 TABLET, CHEWABLE ORAL DAILY
Status: DISCONTINUED | OUTPATIENT
Start: 2022-11-03 | End: 2022-11-04 | Stop reason: HOSPADM

## 2022-11-02 RX ORDER — LISINOPRIL 20 MG/1
40 TABLET ORAL DAILY
Status: DISCONTINUED | OUTPATIENT
Start: 2022-11-03 | End: 2022-11-03

## 2022-11-02 RX ORDER — LEVOTHYROXINE SODIUM 0.03 MG/1
50 TABLET ORAL
Status: DISCONTINUED | OUTPATIENT
Start: 2022-11-03 | End: 2022-11-04 | Stop reason: HOSPADM

## 2022-11-02 RX ADMIN — METOCLOPRAMIDE 10 MG: 5 INJECTION, SOLUTION INTRAMUSCULAR; INTRAVENOUS at 21:06

## 2022-11-02 RX ADMIN — ONDANSETRON 4 MG: 2 INJECTION INTRAMUSCULAR; INTRAVENOUS at 20:26

## 2022-11-02 RX ADMIN — IOHEXOL 100 ML: 350 INJECTION, SOLUTION INTRAVENOUS at 20:55

## 2022-11-02 RX ADMIN — SODIUM CHLORIDE 1000 ML: 0.9 INJECTION, SOLUTION INTRAVENOUS at 20:21

## 2022-11-03 PROBLEM — T68.XXXA HYPOTHERMIA: Status: RESOLVED | Noted: 2022-11-02 | Resolved: 2022-11-03

## 2022-11-03 LAB
ALBUMIN SERPL BCP-MCNC: 3.6 G/DL (ref 3.5–5)
ALP SERPL-CCNC: 53 U/L (ref 34–104)
ALT SERPL W P-5'-P-CCNC: 14 U/L (ref 7–52)
ANION GAP SERPL CALCULATED.3IONS-SCNC: 9 MMOL/L (ref 4–13)
AST SERPL W P-5'-P-CCNC: 18 U/L (ref 13–39)
ATRIAL RATE: 109 BPM
ATRIAL RATE: 79 BPM
BASOPHILS # BLD AUTO: 0.01 THOUSANDS/ÂΜL (ref 0–0.1)
BASOPHILS NFR BLD AUTO: 0 % (ref 0–1)
BILIRUB SERPL-MCNC: 0.48 MG/DL (ref 0.2–1)
BUN SERPL-MCNC: 16 MG/DL (ref 5–25)
CALCIUM SERPL-MCNC: 9.2 MG/DL (ref 8.4–10.2)
CHLORIDE SERPL-SCNC: 106 MMOL/L (ref 96–108)
CO2 SERPL-SCNC: 21 MMOL/L (ref 21–32)
CORTIS SERPL-MCNC: 31.8 UG/DL
CREAT SERPL-MCNC: 0.75 MG/DL (ref 0.6–1.3)
EOSINOPHIL # BLD AUTO: 0 THOUSAND/ÂΜL (ref 0–0.61)
EOSINOPHIL NFR BLD AUTO: 0 % (ref 0–6)
ERYTHROCYTE [DISTWIDTH] IN BLOOD BY AUTOMATED COUNT: 12.7 % (ref 11.6–15.1)
GFR SERPL CREATININE-BSD FRML MDRD: 70 ML/MIN/1.73SQ M
GLUCOSE P FAST SERPL-MCNC: 101 MG/DL (ref 65–99)
GLUCOSE SERPL-MCNC: 101 MG/DL (ref 65–140)
HCT VFR BLD AUTO: 32 % (ref 34.8–46.1)
HGB BLD-MCNC: 10.9 G/DL (ref 11.5–15.4)
IMM GRANULOCYTES # BLD AUTO: 0.02 THOUSAND/UL (ref 0–0.2)
IMM GRANULOCYTES NFR BLD AUTO: 0 % (ref 0–2)
LYMPHOCYTES # BLD AUTO: 0.56 THOUSANDS/ÂΜL (ref 0.6–4.47)
LYMPHOCYTES NFR BLD AUTO: 11 % (ref 14–44)
MAGNESIUM SERPL-MCNC: 2 MG/DL (ref 1.6–2.6)
MCH RBC QN AUTO: 32 PG (ref 26.8–34.3)
MCHC RBC AUTO-ENTMCNC: 34.1 G/DL (ref 31.4–37.4)
MCV RBC AUTO: 94 FL (ref 82–98)
MONOCYTES # BLD AUTO: 0.12 THOUSAND/ÂΜL (ref 0.17–1.22)
MONOCYTES NFR BLD AUTO: 2 % (ref 4–12)
NEUTROPHILS # BLD AUTO: 4.61 THOUSANDS/ÂΜL (ref 1.85–7.62)
NEUTS SEG NFR BLD AUTO: 87 % (ref 43–75)
NRBC BLD AUTO-RTO: 0 /100 WBCS
P AXIS: 19 DEGREES
P AXIS: 24 DEGREES
PHOSPHATE SERPL-MCNC: 3.8 MG/DL (ref 2.3–4.1)
PLATELET # BLD AUTO: 200 THOUSANDS/UL (ref 149–390)
PMV BLD AUTO: 9.9 FL (ref 8.9–12.7)
POTASSIUM SERPL-SCNC: 3.9 MMOL/L (ref 3.5–5.3)
PR INTERVAL: 183 MS
PR INTERVAL: 198 MS
PROT SERPL-MCNC: 6.2 G/DL (ref 6.4–8.4)
QRS AXIS: -86 DEGREES
QRS AXIS: -9 DEGREES
QRSD INTERVAL: 155 MS
QRSD INTERVAL: 86 MS
QT INTERVAL: 341 MS
QT INTERVAL: 467 MS
QTC INTERVAL: 460 MS
QTC INTERVAL: 533 MS
RBC # BLD AUTO: 3.41 MILLION/UL (ref 3.81–5.12)
SODIUM SERPL-SCNC: 136 MMOL/L (ref 135–147)
T WAVE AXIS: 36 DEGREES
T WAVE AXIS: 75 DEGREES
VENTRICULAR RATE: 109 BPM
VENTRICULAR RATE: 78 BPM
WBC # BLD AUTO: 5.32 THOUSAND/UL (ref 4.31–10.16)

## 2022-11-03 RX ORDER — CARVEDILOL 12.5 MG/1
12.5 TABLET ORAL
Status: DISCONTINUED | OUTPATIENT
Start: 2022-11-03 | End: 2022-11-03

## 2022-11-03 RX ORDER — CARVEDILOL 12.5 MG/1
25 TABLET ORAL
Status: DISCONTINUED | OUTPATIENT
Start: 2022-11-03 | End: 2022-11-03

## 2022-11-03 RX ORDER — LISINOPRIL 20 MG/1
40 TABLET ORAL
Status: DISCONTINUED | OUTPATIENT
Start: 2022-11-04 | End: 2022-11-04 | Stop reason: HOSPADM

## 2022-11-03 RX ORDER — CARVEDILOL 12.5 MG/1
12.5 TABLET ORAL 2 TIMES DAILY WITH MEALS
Status: DISCONTINUED | OUTPATIENT
Start: 2022-11-04 | End: 2022-11-04 | Stop reason: HOSPADM

## 2022-11-03 RX ORDER — AMLODIPINE BESYLATE 5 MG/1
5 TABLET ORAL
Status: DISCONTINUED | OUTPATIENT
Start: 2022-11-03 | End: 2022-11-04 | Stop reason: HOSPADM

## 2022-11-03 RX ADMIN — SODIUM CHLORIDE, POTASSIUM CHLORIDE, SODIUM LACTATE AND CALCIUM CHLORIDE 50 ML/HR: 600; 310; 30; 20 INJECTION, SOLUTION INTRAVENOUS at 00:19

## 2022-11-03 RX ADMIN — ONDANSETRON 4 MG: 2 INJECTION INTRAMUSCULAR; INTRAVENOUS at 00:19

## 2022-11-03 RX ADMIN — LISINOPRIL 40 MG: 20 TABLET ORAL at 08:53

## 2022-11-03 RX ADMIN — PANTOPRAZOLE SODIUM 40 MG: 40 TABLET, DELAYED RELEASE ORAL at 06:06

## 2022-11-03 RX ADMIN — PRAVASTATIN SODIUM 40 MG: 40 TABLET ORAL at 22:11

## 2022-11-03 RX ADMIN — HEPARIN SODIUM 5000 UNITS: 5000 INJECTION INTRAVENOUS; SUBCUTANEOUS at 14:11

## 2022-11-03 RX ADMIN — HEPARIN SODIUM 5000 UNITS: 5000 INJECTION INTRAVENOUS; SUBCUTANEOUS at 22:11

## 2022-11-03 RX ADMIN — EZETIMIBE 10 MG: 10 TABLET ORAL at 08:53

## 2022-11-03 RX ADMIN — ASPIRIN 81 MG: 81 TABLET, CHEWABLE ORAL at 08:53

## 2022-11-03 RX ADMIN — HEPARIN SODIUM 5000 UNITS: 5000 INJECTION INTRAVENOUS; SUBCUTANEOUS at 00:19

## 2022-11-03 RX ADMIN — HEPARIN SODIUM 5000 UNITS: 5000 INJECTION INTRAVENOUS; SUBCUTANEOUS at 06:06

## 2022-11-03 RX ADMIN — LEVOTHYROXINE SODIUM 50 MCG: 25 TABLET ORAL at 06:06

## 2022-11-03 RX ADMIN — CARVEDILOL 12.5 MG: 12.5 TABLET, FILM COATED ORAL at 08:53

## 2022-11-03 RX ADMIN — AMLODIPINE BESYLATE 5 MG: 5 TABLET ORAL at 22:11

## 2022-11-03 NOTE — ED PROVIDER NOTES
History  Chief Complaint   Patient presents with   • Vomiting       Brought in by EMS, pt started with nausea tonight, vomited 6 times      40-year-old female with history of aortic stenosis, hypertension, hyperlipidemia, CKD, status post pacemaker insertion who presents for nausea, vomiting, and abdominal pain  Patient reports that her symptoms began approximately 5 hours ago  She notes nausea, vomiting, and epigastric abdominal pain  She reports 4-5 episodes of nonbloody nonbilious emesis  She had a bowel movement just prior to arrival that was otherwise normal for her  She otherwise denies fevers, chest pain, shortness of breath, urinary symptoms  She did not take any medications for her symptoms prior to arrival           Prior to Admission Medications   Prescriptions Last Dose Informant Patient Reported? Taking?    amLODIPine (NORVASC) 5 mg tablet 11/2/2022 at Unknown time Self Yes Yes   Sig: Take 1 tablet by mouth daily   aspirin 81 mg chewable tablet 11/2/2022 at Unknown time Self Yes Yes   Sig: Chew 81 mg daily   carvedilol (COREG) 12 5 mg tablet 11/2/2022 at Unknown time Self Yes Yes   Sig: Take 12 5 mg by mouth 2 (two) times a day with meals   enalapril (VASOTEC) 20 mg tablet 11/2/2022 at Unknown time Self Yes Yes   Sig: Take 20 mg by mouth 2 (two) times a day   ezetimibe (ZETIA) 10 mg tablet 11/2/2022 at Unknown time Self Yes Yes   Sig: Take 10 mg by mouth daily   famotidine (PEPCID) 20 mg tablet 11/2/2022 at Unknown time Self Yes Yes   Sig: Take 20 mg by mouth as needed   ibuprofen (MOTRIN) 200 mg tablet 11/2/2022 at Unknown time  Yes Yes   Sig: Take 1 tablet by mouth as needed She occasionally takes Advil maybe once or twice per month at the most   latanoprost (XALATAN) 0 005 % ophthalmic solution 11/2/2022 at Unknown time Self Yes Yes   Sig: INSTILL 1 DROP IN [facility] EYE AT BEDTIME   levothyroxine 50 mcg tablet 11/2/2022 at Unknown time  No Yes   Sig: TAKE 1 TABLET BY MOUTH EVERY DAY   pravastatin (PRAVACHOL) 40 mg tablet 11/2/2022 at Unknown time Self Yes Yes   Sig: Take 40 mg by mouth daily at bedtime Mon,Friday x2, one ever other day x1      Facility-Administered Medications: None       Past Medical History:   Diagnosis Date   • Allergic rhinitis    • Aortic stenosis, moderate    • Bleeding per rectum    • Blood glucose elevated    • BMI 29 0-29 9,adult 10/19/2022   • BMI 30 0-30 9,adult 10/14/2021   • BMI 32 0-32 9,adult 2/24/2021   • Carotid arterial disease (HCC)    • Colonoscopy refused    • Conjunctivitis, allergic    • Constipation    • Disease of thyroid gland    • Elbow fracture    • GERD (gastroesophageal reflux disease)    • Herpes zoster    • Hyperlipidemia    • Hypertension    • Medicare annual wellness visit, subsequent 10/14/2021   • Medicare annual wellness visit, subsequent 10/19/2022   • Osteoarthritis of multiple joints 1/14/2022   • Pacemaker    • Post herpetic neuralgia    • Skin rash 5/26/2021   • Stage 3a chronic kidney disease ([facility] [address] ) 1/14/2022       Past Surgical History:   Procedure Laterality Date   • CARDIAC PACEMAKER PLACEMENT      ppm   • JOINT REPLACEMENT      both hips   • TOTAL HIP ARTHROPLASTY Left 02/2015   • TUBAL LIGATION         Family History   Problem Relation Age of Onset   • Heart disease Mother    • Cancer Father         colon     I have reviewed and agree with the history as documented  E-Cigarette/Vaping   • E-Cigarette Use Never User      E-Cigarette/Vaping Substances   • Nicotine No    • THC No    • CBD No    • Flavoring No    • Other No    • Unknown No      Social History     Tobacco Use   • Smoking status: Never Smoker   • Smokeless tobacco: Never Used   Vaping Use   • Vaping Use: Never used   Substance Use Topics   • Alcohol use: No     Comment: Occasional use - As per [employer]   • Drug use: No       Review of Systems   Constitutional: Negative for chills and fever  HENT: Negative for congestion and sore throat      Eyes: Negative for visual disturbance  Respiratory: Negative for cough, chest tightness and shortness of breath  Cardiovascular: Negative for chest pain and leg swelling  Gastrointestinal: Positive for abdominal pain, nausea and vomiting  Negative for constipation and diarrhea  Genitourinary: Negative for dysuria, flank pain, frequency and urgency  Musculoskeletal: Negative for back pain and gait problem  Skin: Negative for pallor and rash  Neurological: Negative for syncope, weakness and light-headedness  All other systems reviewed and are negative  Physical Exam  Physical Exam  Vitals and nursing note reviewed  Constitutional:       General: She is not in acute distress  Appearance: She is well-developed  She is not ill-appearing  HENT:      Head: Normocephalic and atraumatic  Nose: Nose normal       Mouth/Throat:      Mouth: Mucous membranes are dry  Eyes:      Extraocular Movements: Extraocular movements intact  Cardiovascular:      Rate and Rhythm: Normal rate and regular rhythm  Heart sounds: No murmur heard  No friction rub  No gallop  Pulmonary:      Effort: Pulmonary effort is normal       Breath sounds: Normal breath sounds  No wheezing, rhonchi or rales  Abdominal:      General: There is no distension  Palpations: Abdomen is soft  Tenderness: There is abdominal tenderness in the epigastric area  There is no guarding or rebound  Musculoskeletal:         General: No swelling or tenderness  Normal range of motion  Cervical back: Normal range of motion and neck supple  Skin:     General: Skin is warm and dry  Coloration: Skin is not pale  Findings: No rash  Neurological:      General: No focal deficit present  Mental Status: She is alert and oriented to person, place, and time     Psychiatric:         Behavior: Behavior normal          Vital Signs  ED Triage Vitals   Temperature Pulse Respirations Blood Pressure SpO2   11/02/22 2012 11/02/22 2003 11/02/22 2003 11/02/22 2005 11/02/22 2003   98 2 °F (36 8 °C) 75 16 (!) 144/49 100 %      Temp Source Heart Rate Source Patient Position - Orthostatic VS BP Location FiO2 (%)   11/02/22 2012 -- -- -- --   Oral          Pain Score       --                  Vitals:    11/02/22 2003 11/02/22 2005 11/02/22 2225   BP:  (!) 144/49    Pulse: 75  78         Visual Acuity      ED Medications  Medications   ondansetron (ZOFRAN) injection 4 mg (has no administration in time range)   amLODIPine (NORVASC) tablet 5 mg (has no administration in time range)   aspirin chewable tablet 81 mg (has no administration in time range)   carvedilol (COREG) tablet 12 5 mg (has no administration in time range)   lisinopril (ZESTRIL) tablet 40 mg (has no administration in time range)   ezetimibe (ZETIA) tablet 10 mg (has no administration in time range)   pravastatin (PRAVACHOL) tablet 40 mg (has no administration in time range)   levothyroxine tablet 50 mcg (has no administration in time range)   pantoprazole (PROTONIX) EC tablet 40 mg (has no administration in time range)   heparin (porcine) subcutaneous injection 5,000 Units (has no administration in time range)   acetaminophen (TYLENOL) tablet 650 mg (has no administration in time range)   ondansetron (ZOFRAN) injection 4 mg (4 mg Intravenous Given 11/2/22 2026)   sodium chloride 0 9 % bolus 1,000 mL (1,000 mL Intravenous New Bag 11/2/22 2021)   metoclopramide (REGLAN) injection 10 mg (10 mg Intravenous Given 11/2/22 2106)   iohexol (OMNIPAQUE) 350 MG/ML injection (SINGLE-DOSE) 100 mL (100 mL Intravenous Given 11/2/22 2055)       Diagnostic Studies  Results Reviewed     Procedure Component Value Units Date/Time    TSH, 3rd generation with Free T4 reflex [[id number]]  (Normal) Collected: 11/02/22 2020    Lab Status: Final result Specimen: Blood from Arm, Left Updated: 11/02/22 2314     TSH 3RD GENERATON 2 785 uIU/mL     Narrative:      Patients undergoing fluorescein dye angiography may retain small amounts of fluorescein in the body for 48-72 hours post procedure  Samples containing fluorescein can produce falsely depressed TSH values  If the patient had this procedure,a specimen should be resubmitted post fluorescein clearance  HS Troponin I 2hr [[id number]]  (Normal) Collected: 11/02/22 2237    Lab Status: Final result Specimen: Blood from Line, Venous Updated: 11/02/22 2308     hs TnI 2hr 6 ng/L      Delta 2hr hsTnI 1 ng/L     HS Troponin I 4hr [[id number]]     Lab Status: No result Specimen: Blood     UA w Reflex to Microscopic w Reflex to Culture [[id number]]  (Abnormal) Collected: 11/02/22 2151    Lab Status: Final result Specimen: Urine, Other Updated: 11/02/22 2159     Color, UA Yellow     Clarity, UA Clear     Specific [place], UA 1 020     pH, UA 6 0     Leukocytes, UA Negative     Nitrite, UA Negative     Protein, UA Negative mg/dl      Glucose, UA Negative mg/dl      Ketones, UA 40 (2+) mg/dl      Urobilinogen, UA 0 2 E U /dl      Bilirubin, UA Negative     Occult Blood, UA Negative    Procalcitonin [[id number]]  (Normal) Collected: 11/02/22 2020    Lab Status: Final result Specimen: Blood from Arm, Left Updated: 11/02/22 2144     Procalcitonin <0 05 ng/ml     Lactic acid [[id number]]  (Normal) Collected: 11/02/22 2104    Lab Status: Final result Specimen: Blood from Arm, Right Updated: 11/02/22 2138     LACTIC ACID 0 8 mmol/L     Narrative:      Result may be elevated if tourniquet was used during collection  Blood culture #2 [[id number]] Collected: 11/02/22 2104    Lab Status: In process Specimen: Blood from Arm, Right Updated: 11/02/22 2112    Blood culture #1 [[id number]] Collected: 11/02/22 2104    Lab Status:  In process Specimen: Blood from Arm, Left Updated: 11/02/22 2112    FLU/RSV/COVID - if FLU/RSV clinically relevant [[id number]]  (Normal) Collected: 11/02/22 2020    Lab Status: Final result Specimen: Nares from Nose Updated: 11/02/22 2106     SARS-CoV-2 Negative INFLUENZA A PCR Negative     INFLUENZA B PCR Negative     RSV PCR Negative    Narrative:      FOR PEDIATRIC PATIENTS - copy/paste COVID Guidelines URL to browser: [email or web address]  [email or web address]    SARS-CoV-2 assay is a Nucleic Acid Amplification assay intended for the  qualitative detection of nucleic acid from SARS-CoV-2 in nasopharyngeal  swabs  Results are for the presumptive identification of SARS-CoV-2 RNA  Positive results are indicative of infection with SARS-CoV-2, the virus  causing COVID-19, but do not rule out bacterial infection or co-infection  with other viruses  Laboratories within the [place] and its  territories are required to report all positive results to the appropriate  public health authorities  Negative results do not preclude SARS-CoV-2  infection and should not be used as the sole basis for treatment or other  patient management decisions  Negative results must be combined with  clinical observations, patient history, and epidemiological information  This test has not been FDA cleared or approved  This test has been authorized by FDA under an Emergency Use Authorization  (EUA)  This test is only authorized for the duration of time the  declaration that circumstances exist justifying the authorization of the  emergency use of an in vitro diagnostic tests for detection of SARS-CoV-2  virus and/or diagnosis of COVID-19 infection under section 564(b)(1) of  the Act, 21 U  S C  141LGP-6(P)(0), unless the authorization is terminated  or revoked sooner  The test has been validated but independent review by FDA  and CLIA is pending  Test performed using [employer] Gene[employer]pert: This RT-PCR assay targets N2,  a region unique to SARS-CoV-2  A conserved region in the E-gene was chosen  for pan-Sarbecovirus detection which includes SARS-CoV-2  According to CMS-2020-01-R, this platform meets the definition of high-throughput technology      HS Troponin 0hr (reflex protocol) [[id number]]  (Normal) Collected: 11/02/22 2020    Lab Status: Final result Specimen: Blood from Arm, Left Updated: 11/02/22 2054     hs TnI 0hr 5 ng/L     Comprehensive metabolic panel [[id number]]  (Abnormal) Collected: 11/02/22 2020    Lab Status: Final result Specimen: Blood from Arm, Left Updated: 11/02/22 2046     Sodium 134 mmol/L      Potassium 4 1 mmol/L      Chloride 103 mmol/L      CO2 20 mmol/L      ANION GAP 11 mmol/L      BUN 24 mg/dL      Creatinine 1 08 mg/dL      Glucose 164 mg/dL      Calcium 10 4 mg/dL      AST 21 U/L      ALT 16 U/L      Alkaline Phosphatase 68 U/L      Total Protein 7 8 g/dL      Albumin 4 4 g/dL      Total Bilirubin 0 54 mg/dL      eGFR 45 ml/min/1 73sq m     Narrative:      [place] guidelines for Chronic Kidney Disease (CKD):   •  Stage 1 with normal or high GFR (GFR > 90 mL/min/1 73 square meters)  •  Stage 2 Mild CKD (GFR = 60-89 mL/min/1 73 square meters)  •  Stage 3A Moderate CKD (GFR = 45-59 mL/min/1 73 square meters)  •  Stage 3B Moderate CKD (GFR = 30-44 mL/min/1 73 square meters)  •  Stage 4 Severe CKD (GFR = 15-29 mL/min/1 73 square meters)  •  Stage 5 End Stage CKD (GFR <15 mL/min/1 73 square meters)  Note: GFR calculation is accurate only with a steady state creatinine    Lipase [[id number]]  (Normal) Collected: 11/02/22 2020    Lab Status: Final result Specimen: Blood from Arm, Left Updated: 11/02/22 2046     Lipase 27 u/L     CBC and differential [[id number]] Collected: 11/02/22 2020    Lab Status: Final result Specimen: Blood from Arm, Left Updated: 11/02/22 2028     WBC 7 34 Thousand/uL      RBC 4 04 Million/uL      Hemoglobin 13 1 g/dL      Hematocrit 37 9 %      MCV 94 fL      MCH 32 4 pg      MCHC 34 6 g/dL      RDW 12 5 %      MPV 9 3 fL      Platelets 201 Thousands/uL      nRBC 0 /100 WBCs      Neutrophils Relative 74 %      Immat GRANS % 1 %      Lymphocytes Relative 14 %      Monocytes Relative 6 % Eosinophils Relative 4 %      Basophils Relative 1 %      Neutrophils Absolute 5 48 Thousands/µL      Immature Grans Absolute 0 05 Thousand/uL      Lymphocytes Absolute 1 03 Thousands/µL      Monocytes Absolute 0 46 Thousand/µL      Eosinophils Absolute 0 28 Thousand/µL      Basophils Absolute 0 04 Thousands/µL                  CT abdomen pelvis with contrast   Final Result by Celio Duvall DO (11/02 2209)      No acute inflammatory process or suspicious mass identified              Workstation performed: [id number]         XR chest 1 view portable    (Results Pending)              Procedures  CriticalCare Time  Performed by: Brigido Orellana MD  Authorized by: Brigido Orellana MD     Critical care provider statement:     Critical care start time:  11/2/2022 8:56 PM    Critical care time was exclusive of:  Separately billable procedures and treating other patients and teaching time    Critical care was necessary to treat or prevent imminent or life-threatening deterioration of the following conditions:  Circulatory failure    Critical care was time spent personally by me on the following activities:  Blood draw for specimens, obtaining history from patient or surrogate, development of treatment plan with patient or surrogate, evaluation of patient's response to treatment, examination of patient, ordering and performing treatments and interventions, ordering and review of laboratory studies, ordering and review of radiographic studies and re-evaluation of patient's condition             ED Course  ED Course as of 11/02/22 2340 Wed Nov 02, 2022 2029 CBC and differential   2047 Comprehensive metabolic panel(!)   1936 Lipase: 27   2049 Procedure Note: EKG  Date/Time: 11/02/22 8:43 PM   Interpreted by: Beau Almeida  Indications / Diagnosis: vomiting  ECG reviewed by me, the ED Provider: yes   The EKG demonstrates:  Rhythm: rate 78, normal sinus  Intervals: IVCD  Axis: normal axis  QRS/Blocks: normal QRS  ST Changes: No acute ST Changes, no STD/ADWOA     2054 hs TnI 0hr: 5   2113 FLU/RSV/COVID - if FLU/RSV clinically relevant   2138 LACTIC ACID: 0 8   2145 Procalcitonin: <0 05   2159 UA w Reflex to Microscopic w Reflex to Culture(!)                               SBIRT 20yo+    Flowsheet Row Most Recent Value   SBIRT (23 yo +)    In order to provide better care to our patients, we are screening all of our patients for alcohol and drug use  Would it be okay to ask you these screening questions? Yes Filed at: 11/02/2022 2006   Initial Alcohol Screen: US AUDIT-C     1  How often do you have a drink containing alcohol? 0 Filed at: 11/02/2022 2006   2  How many drinks containing alcohol do you have on a typical day you are drinking? 0 Filed at: 11/02/2022 2006   3b  FEMALE Any Age, or MALE 65+: How often do you have 4 or more drinks on one occassion? 0 Filed at: 11/02/2022 2006   Audit-C Score 0 Filed at: 11/02/2022 2006   JERRI: How many times in the past year have you    Used an illegal drug or used a prescription medication for non-medical reasons? Never Filed at: 11/02/2022 2006                    MDM  Number of Diagnoses or Management Options  Hypothermia, initial encounter: new and requires workup  Nausea and vomiting, unspecified vomiting type: new and requires workup  Diagnosis management comments: 27-year-old female who presents for nausea and vomiting  Patient noted to be hypothermic on arrival, [facility] placed  Vitals otherwise stable  Will obtain abdominal labs, cardiac workup, CT abdomen pelvis  Labs unremarkable  Troponin 5 with no ischemic EKG changes  CT abdomen pelvis with no acute pathology  Septic workup negative  Unclear etiology of patient's symptoms  Patient continues with nausea while in the department despite multiple doses of antiemetics  Given her uncontrolled nausea as well as her hypothermia, discussed with shelley and admitted to their service         Amount and/or Complexity of Data Reviewed  Clinical lab tests: ordered and reviewed  Tests in the radiology section of CPT®: ordered and reviewed  Review and summarize past medical records: yes  Discuss the patient with other providers: yes    Risk of Complications, Morbidity, and/or Mortality  Presenting problems: high  Diagnostic procedures: low  Management options: moderate    Patient Progress  Patient progress: improved      Disposition  Final diagnoses:   Nausea and vomiting, unspecified vomiting type   Hypothermia, initial encounter     Time reflects when diagnosis was documented in both MDM as applicable and the Disposition within this note     Time User Action Codes Description Comment    11/2/2022 10:21 PM Jennie [place] Add [R11 2] Nausea and vomiting, unspecified vomiting type     11/2/2022 10:21 PM Taylor Gile  XXXA] Hypothermia, initial encounter       ED Disposition     ED Disposition   Admit    Condition   Stable    Date/Time   Wed Nov 2, 2022 10:21 PM    Comment   Case was discussed with DAMARIS and the patient's admission status was agreed to be Admission Status: observation status to the service of Dr Earl York              Follow-up Information    None         Current Discharge Medication List      CONTINUE these medications which have NOT CHANGED    Details   amLODIPine (NORVASC) 5 mg tablet Take 1 tablet by mouth daily      aspirin 81 mg chewable tablet Chew 81 mg daily      carvedilol (COREG) 12 5 mg tablet Take 12 5 mg by mouth 2 (two) times a day with meals      enalapril (VASOTEC) 20 mg tablet Take 20 mg by mouth 2 (two) times a day      ezetimibe (ZETIA) 10 mg tablet Take 10 mg by mouth daily      famotidine (PEPCID) 20 mg tablet Take 20 mg by mouth as needed      ibuprofen (MOTRIN) 200 mg tablet Take 1 tablet by mouth as needed She occasionally takes Advil maybe once or twice per month at the most      latanoprost (XALATAN) 0 005 % ophthalmic solution INSTILL 1 DROP IN [facility] EYE AT BEDTIME      levothyroxine 50 mcg tablet TAKE 1 TABLET BY MOUTH EVERY DAY  Qty: 30 tablet, Refills: 2    Associated Diagnoses: Acquired hypothyroidism      pravastatin (PRAVACHOL) 40 mg tablet Take 40 mg by mouth daily at bedtime Mon,Friday x2, one ever other day x1             No discharge procedures on file      PDMP Review     None          ED Provider  Electronically Signed by           Remberto Godinez MD  11/02/22 6174

## 2022-11-03 NOTE — ASSESSMENT & PLAN NOTE
Reports sensation of dizziness & lightheaded when changing from sitting to standing  Notes some new difficulty with gait  Denies visual changes, hearing loss, paresthesias, facial droop, slurred speech, CP, SOB  No focal deficits     · Suspect orthostasis given dehydration from N/V and recent increase in BB  · Coreg increased from 12 5mg q a m/25q p m  to 25mg b i d  - continue with prior regimen for now  · Also noted to have b/l carotid artery stenosis - unchanged and monitored by vascular surg q6 months   · Recent echo reviewed showing an LVEF 60%, G1DD and severe AS  · Check orthostatic BP  · Tele overnight

## 2022-11-03 NOTE — CASE MANAGEMENT
Case Management Assessment & Discharge Planning Note    Patient name Giacomo Hernandez /-83 MRN [id number]  : 1933 Date 11/3/2022       Current Admission Date: 2022  Current Admission Diagnosis:Intractable nausea and vomiting   Patient Active Problem List    Diagnosis Date Noted   • Hypothermia 2022   • Intractable nausea and vomiting 2022   • Dizziness 2022   • Medicare annual wellness visit, subsequent 10/19/2022   • BMI 29 0-29 9,adult 10/19/2022   • Stage 3a chronic kidney disease ([facility] [address] ) 2022   • Osteoarthritis of multiple joints 2022   • BMI 30 0-30 9,adult 10/14/2021   • BMI 31 0-31 9,adult 2021   • Skin rash 2021   • Hyperglycemia 2021   • BMI 32 0-32 9,adult 2021   • Primary osteoarthritis involving multiple joints 2021   • Mixed hyperlipidemia 2021   • Aortic valve stenosis 10/01/2020   • Carotid stenosis, asymptomatic, bilateral 10/01/2020   • History of permanent cardiac pacemaker placement 2019   • Spiral fracture of shaft of humerus 2018   • SDH (subdural hematoma) 2018   • Essential hypertension 10/21/2013   • Gastroesophageal reflux disease without esophagitis 10/21/2013   • Acquired hypothyroidism 10/21/2013      LOS (days): 0  Geometric Mean LOS (GMLOS) (days):   Days to GMLOS:     OBJECTIVE:              Current admission status: Observation       Preferred Pharmacy:   [facility] [address] [place] [address]  Phone: [phone] Fax: [phone]    Primary Care Provider: Juan Luis Hernandez MD    Primary Insurance: MEDICARE  Secondary Insurance: AETNA    ASSESSMENT:  Herminia Gallegos Proxies    There are no active Health Care Proxies on file         Advance Directives  Does patient have a Health Care POA?: Yes  Does patient have Advance Directives?: Yes  Advance Directives: Living will, Power of  for health care  Primary Contact: Gabriella Gonzalez (dtr)              Patient Information  Admitted from[de-identified] Home  Mental Status: Alert  During Assessment patient was accompanied by: Daughter  Assessment information provided by[de-identified] Patient, Daughter  Primary Caregiver: Self  Support Systems: Isabela Aguilar Dr of Residence: [address] [place] [address] do you live in?: [place] entry access options  Select all that apply : Stairs  Number of steps to enter home : 1  Type of Current Residence: [place]  In the last 12 months, was there a time when you were not able to pay the mortgage or rent on time?: No  In the last 12 months, was there a time when you did not have a steady place to sleep or slept in a shelter (including now)?: No  Homeless/housing insecurity resource given?: No  Living Arrangements: Lives Alone    Activities of Daily Living Prior to Admission  Functional Status: Independent  Completes ADLs independently?: Yes  Ambulates independently?: Yes  Does patient use assisted devices?: Yes  Assisted Devices (DME) used:  Other (Comment) (raised toilet seat)  Does patient currently own DME?: Yes  What DME does the patient currently own?: Peace Mcdaniels, Other (Comment) (raised toilet seat)  Does patient have a history of Outpatient Therapy (PT/OT)?: No  Does the patient have a history of Short-Term Rehab?: No  Does patient have a history of HHC?: Yes  Does patient currently have [address]?: No         Patient Information Continued  Within the past 12 months, you worried that your food would run out before you got the money to buy more : Never true  Within the past 12 months, the food you bought just didn't last and you didn't have money to get more : Never true         Means of Transportation  In the past 12 months, has lack of transportation kept you from medical appointments or from getting medications?: No  In the past 12 months, has lack of transportation kept you from meetings, work, or from getting things needed for daily living?: No        DISCHARGE DETAILS:    Discharge planning discussed with[de-identified] patient and pt's dtr Efren Callejas)  Freedom of Choice: Yes  Comments - Freedom of Choice: pt reported hx of hhc after hip surgery  CM contacted family/caregiver?: Yes  Were Treatment Team discharge recommendations reviewed with patient/caregiver?: Yes  Did patient/caregiver verbalize understanding of patient care needs?: Yes  Were patient/caregiver advised of the risks associated with not following Treatment Team discharge recommendations?: Yes    Contacts  Patient Contacts: Peri Meyers (dtr)  Relationship to Patient[de-identified] Family  Contact Method: In Person  Reason/Outcome: Discharge [address]         Is the patient interested in [facility] at discharge?: No    DME Referral Provided  Referral made for DME?: No    Other Referral/Resources/Interventions Provided:  Interventions: None Indicated  Referral Comments: No CM d/c needs indicated at this time, CM remains available for any further CM d/c needs      Would you like to participate in our [address] service program?  : No - Declined Magda Anderson    Treatment Team Recommendation: Home  Discharge Destination Plan[de-identified] Home  Transport at Discharge : Family

## 2022-11-03 NOTE — ASSESSMENT & PLAN NOTE
· Continue home Norvasc, Coreg, ACE-I  · Will switch Norvasc and ACE-inhibitor to q h s   As taking at night could help with daytime dizziness

## 2022-11-03 NOTE — ASSESSMENT & PLAN NOTE
Reports sensation of intermittent dizziness described as lightheaded when changing from sitting to standing  · Suspect orthostasis w/ N/V   · Gentle hydration overnight given N/V & recent increase in BB   · Coreg increased from 12 5mg q a m/25q p m  to 25mg b i d  - continue with prior regimen for now  · Also noted to have b/l carotid artery stenosis - unchanged and monitored by vascular surg q6 months   · Recent Echo 8/2022 showed:  · Nondilated left ventricle with EF of 60 %  Grade 1 diastolic dysfunction (impaired relaxation) present       Aortic valve is severely calcified with evidence of moderate to severe stenosis and mean gradient of 26 mmHg   Peak gradient is 41 mmHg   MICHELLE is not calculated     · Orthostatic blood pressure: Negative  · Telemetry overnight showed paced rhythm  · Patient with no further complaints, feeling great  · With moderate-severe aortic stenosis and complaints of dizziness  · Recommend close outpatient follow up with Cardiology

## 2022-11-03 NOTE — ASSESSMENT & PLAN NOTE
Presents with 5 episodes of NBNB vomitus starting today while eating dinner  W/ associated epigastric pain  Is having BMs but reports occasional constipation     · Laboratory workup unremarkable - normal LFTs, lipase  · CT abdomen pelvis without acute findings - gallstones noted within GB but no pericholecystic inflammatory changes  · ECG nonischemic, hs troponin negative    · Continue IV fluid hydration, antiemetics, PPI  · NPO, advance diet as tolerated  · Gentle IVF given AS

## 2022-11-03 NOTE — ASSESSMENT & PLAN NOTE
Presents with 5 episodes of NBNB vomitus starting today while eating dinner  W/ associated epigastric pain  Is having BMs but reports occasional constipation  · Laboratory workup unremarkable - normal LFTs, lipase  · CT abdomen pelvis without acute findings - gallstones noted within GB but no pericholecystic inflammatory changes  · ECG nonischemic, hs troponin negative    · Continue IV fluid hydration, antiemetics, PPI  · Patient was made NPO overnight and placed on IV fluid hydration  · Advance diet to clear liquids this morning which patient tolerated then increased to surgical soft/light meal which patient has also been tolerating  · Patient has not required any anti nausea medications  · Repeat EKG today showed QTC less than 500, on discharge will probably send patient home with Zofran to be taken p r n

## 2022-11-03 NOTE — H&P
[address]  H&P- Dewey Sterling 9/12/1933, 80 y o  female MRN: [id number]  Unit/Bed#: -[facility] Encounter: [id number]  Primary Care Provider: Jess Murrell MD   Date and time admitted to hospital: 11/2/2022  8:02 PM    * Intractable nausea and vomiting  Assessment & Plan  Presents with 5 episodes of NBNB vomitus starting today while eating dinner  W/ associated epigastric pain  Is having BMs but reports occasional constipation  · Laboratory workup unremarkable - normal LFTs, lipase  · CT abdomen pelvis without acute findings - gallstones noted within GB but no pericholecystic inflammatory changes  · ECG nonischemic, hs troponin negative    · Continue IV fluid hydration, antiemetics, PPI  · NPO, advance diet as tolerated  · Gentle IVF given AS    Dizziness  Assessment & Plan  Reports sensation of intermittent dizziness & lightheaded when changing from sitting to standing  Notes some new difficulty with gait  Denies LOC, visual changes, hearing loss, paresthesias, facial droop, slurred speech, CP, SOB  No focal deficits  · Suspect orthostasis w/ N/V vs BPPV  · Gentle hydration overnight given N/V & recent increase in BB vs BPPV   · Coreg increased from 12 5mg q a m/25q p m  to 25mg b i d  - continue with prior regimen for now  · Also noted to have b/l carotid artery stenosis - unchanged and monitored by vascular surg q6 months   · Recent echo reviewed showing an LVEF 60%, G1DD and severe AS  · Check orthostatic BP  · Tele overnight       Hypothermia  Assessment & Plan  · T 34 3 in ED  Improved to 35 2 w/ Theresa clinton  · Unclear etiology - does have underlying hypothyroidism  · Does not appear to be infectious etiology   · BCx2 obtained, r/o occult infection  · Check TSH, a m   Cortisol  · Continue Theresa Clinton, temp probe [place]    Acquired hypothyroidism  Assessment & Plan  · Continue levothyroxine   · Obtain TSH    Essential hypertension  Assessment & Plan  · Continue home Norvasc, Coreg, ACE-i    VTE Pharmacologic Prophylaxis: VTE Score: 4 heparin  Code Status: Level 1 - Full Code   Discussion with family: Patient declined call to   Anticipated Length of Stay: Patient will be admitted on an observation basis with an anticipated length of stay of less than 2 midnights secondary to intractable N/V, hypothermia requiring rewarming  Total Time for Visit, including Counseling / Coordination of Care: 45 minutes Greater than 50% of this total time spent on direct patient counseling and coordination of care  Chief Complaint: abdominal pain     History of Present Illness:  Argelia Teran is a 80 y o  female with a PMH of SSS s/p PPM, HTN, HLD, carotid artery stenosis who presents with acute onset of abdominal pain  Patient states she was eating dinner earlier tonight when suddenly she developed epigastric abdominal pain and had 5 episodes of nonbilious nonbloody vomitus  Reports associated nausea  States that she has been having bowel movements, however she is occasionally constipated  Denies any fevers, sick contacts, acid reflux, chest pain or shortness of breath, paresthesias  Denies known history of gallstones  Also reports of a sensation of dizziness and lightheadedness when she changes positions from sitting distended min  Not associated with head movements  No associated change in vision, hearing, facial droop, paresthesias, one-sided weakness, headache, chest pain or shortness of breath  Reports recent increase in her Coreg  Reports compliance with medications  In ER, lab workup mostly unremarkable  Was found with mild hypothermia improving with [employer]  Patient still with nausea and vomiting after Reglan and Zofran  Review of Systems:  Review of Systems   Constitutional: Positive for chills  Negative for diaphoresis, fatigue and fever  HENT: Negative for congestion  Eyes: Negative for visual disturbance     Respiratory: Negative for cough, chest tightness, shortness of breath and wheezing  Cardiovascular: Negative for chest pain, palpitations and leg swelling  Gastrointestinal: Positive for abdominal pain, constipation, nausea and vomiting  Negative for blood in stool and diarrhea  Endocrine: Negative for polyuria  Genitourinary: Negative for difficulty urinating, dysuria, hematuria and urgency  Musculoskeletal: Negative for myalgias  Skin: Negative for pallor  Neurological: Positive for dizziness and light-headedness  Negative for tremors, seizures, syncope, facial asymmetry, speech difficulty, weakness, numbness and headaches  Psychiatric/Behavioral: Negative for sleep disturbance  Past Medical and Surgical History:   Past Medical History:   Diagnosis Date   • Allergic rhinitis    • Aortic stenosis, moderate    • Bleeding per rectum    • Blood glucose elevated    • BMI 29 0-29 9,adult 10/19/2022   • BMI 30 0-30 9,adult 10/14/2021   • BMI 32 0-32 9,adult 2/24/2021   • Carotid arterial disease (HCC)    • Colonoscopy refused    • Conjunctivitis, allergic    • Constipation    • Disease of thyroid gland    • Elbow fracture    • GERD (gastroesophageal reflux disease)    • Herpes zoster    • Hyperlipidemia    • Hypertension    • Medicare annual wellness visit, subsequent 10/14/2021   • Medicare annual wellness visit, subsequent 10/19/2022   • Osteoarthritis of multiple joints 1/14/2022   • Pacemaker    • Post herpetic neuralgia    • Skin rash 5/26/2021   • Stage 3a chronic kidney disease ([facility] [address] ) 1/14/2022       Past Surgical History:   Procedure Laterality Date   • CARDIAC PACEMAKER PLACEMENT      ppm   • JOINT REPLACEMENT      both hips   • TOTAL HIP ARTHROPLASTY Left 02/2015   • TUBAL LIGATION         Meds/Allergies:  Prior to Admission medications    Medication Sig Start Date End Date Taking?  Authorizing Provider   amLODIPine (NORVASC) 5 mg tablet Take 1 tablet by mouth daily   Yes Historical Provider, MD   aspirin 81 mg chewable tablet Chew 81 mg daily   Yes Historical Provider, MD   carvedilol (COREG) 12 5 mg tablet Take 12 5 mg by mouth 2 (two) times a day with meals 7/7/22  Yes Historical Provider, MD   enalapril (VASOTEC) 20 mg tablet Take 20 mg by mouth 2 (two) times a day   Yes Historical Provider, MD   ezetimibe (ZETIA) 10 mg tablet Take 10 mg by mouth daily   Yes Historical Provider, MD   famotidine (PEPCID) 20 mg tablet Take 20 mg by mouth as needed   Yes Historical Provider, MD   ibuprofen (MOTRIN) 200 mg tablet Take 1 tablet by mouth as needed She occasionally takes Advil maybe once or twice per month at the most   Yes Historical Provider, MD   latanoprost (XALATAN) 0 005 % ophthalmic solution INSTILL 1 DROP IN [facility] EYE AT BEDTIME 12/1/21  Yes Historical Provider, MD   levothyroxine 50 mcg tablet TAKE 1 TABLET BY MOUTH EVERY DAY 9/26/22  Yes Telma Courser, MD   pravastatin (PRAVACHOL) 40 mg tablet Take 40 mg by mouth daily at bedtime Mon,Friday x2, one ever other day x1   Yes Historical Provider, MD     I have reviewed home medications using recent Epic encounter  Allergies: Allergies   Allergen Reactions   • Ampicillin    • Codeine GI Intolerance     Light headed   • Penicillins GI Intolerance       Social History:  Marital Status:     Occupation:   Patient Pre-hospital Living Situation: Home  Patient Pre-hospital Level of Mobility: walks  Patient Pre-hospital Diet Restrictions:   Substance Use History:   Social History     Substance and Sexual Activity   Alcohol Use No    Comment: Occasional use - As per [employer]     Social History     Tobacco Use   Smoking Status Never Smoker   Smokeless Tobacco Never Used     Social History     Substance and Sexual Activity   Drug Use No       Family History:  Family History   Problem Relation Age of Onset   • Heart disease Mother    • Cancer Father         colon       Physical Exam:     Vitals:   Blood Pressure: 133/57 (11/02/22 2345)  Pulse: 82 (11/02/22 2345)  Temperature: (!) 96 6 °F (35 9 °C) (11/03/22 0032)  Temp Source: Probe (11/03/22 0032)  Respirations: 16 (11/02/22 2345)  Height: 5' 3" (160 cm) (11/02/22 2003)  Weight - Scale: 76 2 kg (168 lb) (11/02/22 2003)  SpO2: 95 % (11/02/22 2345)    Physical Exam  Vitals and nursing note reviewed  Constitutional:       General: She is not in acute distress  Appearance: She is well-developed  HENT:      Head: Normocephalic and atraumatic  Mouth/Throat:      Mouth: Mucous membranes are moist    Eyes:      General: No visual field deficit  Extraocular Movements: Extraocular movements intact  Pupils: Pupils are equal, round, and reactive to light  Cardiovascular:      Rate and Rhythm: Normal rate and regular rhythm  Pulses: Normal pulses  Heart sounds: Murmur heard  Pulmonary:      Effort: Pulmonary effort is normal  No respiratory distress  Breath sounds: Normal breath sounds  No wheezing or rales  Abdominal:      General: Abdomen is flat  Bowel sounds are normal  There is no distension  Palpations: Abdomen is soft  Tenderness: There is abdominal tenderness (epigastric)  There is no guarding or rebound  Musculoskeletal:      Right lower leg: No edema  Left lower leg: No edema  Skin:     General: Skin is warm and dry  Neurological:      General: No focal deficit present  Mental Status: She is alert and oriented to person, place, and time  GCS: GCS eye subscore is 4  GCS verbal subscore is 5  GCS motor subscore is 6  Cranial Nerves: No cranial nerve deficit, dysarthria or facial asymmetry  Sensory: Sensation is intact  Motor: No weakness or tremor        Coordination: Finger-Nose-Finger Test normal  Rapid alternating movements normal    Psychiatric:         Mood and Affect: Mood normal          Behavior: Behavior normal           Additional Data:   Lab Results:  Results from last 7 days   Lab Units 11/02/22 2020   WBC Thousand/uL 7 34   HEMOGLOBIN g/dL 13 1   HEMATOCRIT % 37 9   PLATELETS Thousands/uL 221   NEUTROS PCT % 74   LYMPHS PCT % 14   MONOS PCT % 6   EOS PCT % 4     Results from last 7 days   Lab Units 11/02/22 2020   SODIUM mmol/L 134*   POTASSIUM mmol/L 4 1   CHLORIDE mmol/L 103   CO2 mmol/L 20*   BUN mg/dL 24   CREATININE mg/dL 1 08   ANION GAP mmol/L 11   CALCIUM mg/dL 10 4*   ALBUMIN g/dL 4 4   TOTAL BILIRUBIN mg/dL 0 54   ALK PHOS U/L 68   ALT U/L 16   AST U/L 21   GLUCOSE RANDOM mg/dL 164*                 Results from last 7 days   Lab Units 11/02/22 2104 11/02/22 2020   LACTIC ACID mmol/L 0 8  --    PROCALCITONIN ng/ml  --  <0 05       Imaging: Reviewed radiology reports from this admission including: abdominal/pelvic CT  CT abdomen pelvis with contrast   Final Result by Daniela Amaral DO (11/02 2209)      No acute inflammatory process or suspicious mass identified  Workstation performed: [id number]         XR chest 1 view portable    (Results Pending)       EKG and Other Studies Reviewed on Admission:   · EKG: Atrial-sensed ventricular-paced complexes, 78bpm QTc 533    ** Please Note: This note has been constructed using a voice recognition system   **

## 2022-11-03 NOTE — ASSESSMENT & PLAN NOTE
Reports sensation of intermittent dizziness described as lightheaded when changing from sitting to standing  · Suspect orthostasis w/ N/V   · Gentle hydration overnight given N/V & recent increase in BB   · Coreg increased from 12 5mg q a m/25q p m  to 25mg b i d  - continue with prior regimen for now  · Also noted to have b/l carotid artery stenosis - unchanged and monitored by vascular surg q6 months   · Recent Echo 8/2022 showed  Nondilated left ventricle with EF of 60 %  Grade 1 diastolic dysfunction (impaired relaxation) present       Aortic valve is severely calcified with evidence of moderate to severe stenosis and mean gradient of 26 mmHg   Peak gradient is 41 mmHg   MICHELLE is not calculated  · ? If aortic stenosis if stat cause of dizziness  If patient continues to have disease tomorrow will reach out to Cardiology  Patient states that her cardiologist at [facility] told patient that they will monitor for time being  · Orthostatic blood pressure pending  · Telemetry overnight showed paced rhythm  · Was planning on discharging patient today but later in the afternoon after patient use the bathroom she fell dizzy and was having difficulty ambulating requiring assistance to get back to her bed  States that she was straining to have bowel movement and to urinate    Suspect most likely symptoms are vasovagal     · Will keep patient overnight and reassess tomorrow

## 2022-11-03 NOTE — ASSESSMENT & PLAN NOTE
· T 34 3 in ED  Improved to 35 2 w/ Theresa clinton  · Unclear etiology - does have underlying hypothyroidism  · Does not appear to be infectious etiology   · BCx2 obtained, r/o occult infection  · Check TSH, a m   Cortisol  · Continue Theresa Clinton, temp probe Sybil

## 2022-11-04 ENCOUNTER — TRANSITIONAL CARE MANAGEMENT (OUTPATIENT)
Dept: INTERNAL MEDICINE CLINIC | Facility: CLINIC | Age: 87
End: 2022-11-04

## 2022-11-04 VITALS
WEIGHT: 165 LBS | TEMPERATURE: 98.1 F | OXYGEN SATURATION: 93 % | RESPIRATION RATE: 12 BRPM | DIASTOLIC BLOOD PRESSURE: 81 MMHG | SYSTOLIC BLOOD PRESSURE: 140 MMHG | HEIGHT: 63 IN | HEART RATE: 98 BPM | BODY MASS INDEX: 29.23 KG/M2

## 2022-11-04 LAB
ATRIAL RATE: 88 BPM
P AXIS: 44 DEGREES
PR INTERVAL: 194 MS
QRS AXIS: -87 DEGREES
QRSD INTERVAL: 142 MS
QT INTERVAL: 404 MS
QTC INTERVAL: 488 MS
T WAVE AXIS: 71 DEGREES
VENTRICULAR RATE: 88 BPM

## 2022-11-04 RX ORDER — AMLODIPINE BESYLATE 5 MG/1
5 TABLET ORAL
Refills: 0
Start: 2022-11-04

## 2022-11-04 RX ORDER — ONDANSETRON 4 MG/1
4 TABLET, ORALLY DISINTEGRATING ORAL EVERY 6 HOURS PRN
Qty: 20 TABLET | Refills: 0 | Status: SHIPPED | OUTPATIENT
Start: 2022-11-04

## 2022-11-04 RX ORDER — CARVEDILOL 12.5 MG/1
12.5 TABLET ORAL 2 TIMES DAILY WITH MEALS
Qty: 60 TABLET | Refills: 0 | Status: SHIPPED | OUTPATIENT
Start: 2022-11-04

## 2022-11-04 RX ADMIN — LEVOTHYROXINE SODIUM 50 MCG: 25 TABLET ORAL at 05:41

## 2022-11-04 RX ADMIN — CARVEDILOL 12.5 MG: 12.5 TABLET, FILM COATED ORAL at 09:58

## 2022-11-04 RX ADMIN — EZETIMIBE 10 MG: 10 TABLET ORAL at 09:58

## 2022-11-04 RX ADMIN — HEPARIN SODIUM 5000 UNITS: 5000 INJECTION INTRAVENOUS; SUBCUTANEOUS at 05:41

## 2022-11-04 RX ADMIN — ASPIRIN 81 MG: 81 TABLET, CHEWABLE ORAL at 09:58

## 2022-11-04 RX ADMIN — PANTOPRAZOLE SODIUM 40 MG: 40 TABLET, DELAYED RELEASE ORAL at 05:41

## 2022-11-04 NOTE — DISCHARGE SUMMARY
[facility] [address]  Discharge- Gamaliel Sees 9/12/1933, 80 y o  female MRN: [id number]  Unit/Bed#: -[facility]or Encounter: [id number]  Primary Care Provider: Sudhakar Mao MD   Date and time admitted to hospital: 11/2/2022  8:02 PM    * Intractable nausea and vomiting  Assessment & Plan  Presents with 5 episodes of NBNB vomitus starting today while eating dinner  W/ associated epigastric pain  Is having BMs but reports occasional constipation  · Laboratory workup unremarkable - normal LFTs, lipase  · CT abdomen pelvis without acute findings - gallstones noted within GB but no pericholecystic inflammatory changes  · ECG nonischemic, hs troponin negative    · BC x2: NGTD (24 hours)  · S/p IV fluid hydration, antiemetics, PPI  · Advanced diet with no further complaints of N/V  · Patient has not required any anti nausea medications  · Repeat EKG today showed QTC less than 500, on discharge will probably send patient home with Zofran to be taken p r n  · Recommend outpatient follow up with PCP    Dizziness  Assessment & Plan  Reports sensation of intermittent dizziness described as lightheaded when changing from sitting to standing  · Suspect orthostasis w/ N/V   · Gentle hydration overnight given N/V & recent increase in BB   · Coreg increased from 12 5mg q a m/25q p m  to 25mg b i d  - continue with prior regimen for now  · Also noted to have b/l carotid artery stenosis - unchanged and monitored by vascular surg q6 months   · Recent Echo 8/2022 showed:  · Nondilated left ventricle with EF of 60 %  Grade 1 diastolic dysfunction (impaired relaxation) present       Aortic valve is severely calcified with evidence of moderate to severe stenosis and mean gradient of 26 mmHg   Peak gradient is 41 mmHg   MICHELLE is not calculated     · Orthostatic blood pressure: Negative  · Telemetry overnight showed paced rhythm  · Patient with no further complaints, feeling great  · With moderate-severe aortic stenosis and complaints of dizziness  · Recommend close outpatient follow up with Cardiology     Acquired hypothyroidism  Assessment & Plan  · Continue levothyroxine   · TSH within normal levels    Essential hypertension  Assessment & Plan  · Continue home medication:  · Norvasc, Coreg, ACE-I  · Norvasc and ACE-inhibitor switched to to q h s  As taking at night could help with daytime dizziness      Medical Problems             Resolved Problems  Date Reviewed: 11/4/2022          Resolved    Hypothermia 11/3/2022     Resolved by  Padilla Husain DO              Discharging Physician / Practitioner: Genevieve Seaman  PCP: Telma Mayorga MD  Admission Date:   Admission Orders (From admission, onward)     Ordered        11/03/22 1637  Inpatient Admission  Once            11/02/22 2229  Place in Observation  Once                      Discharge Date: 11/04/22    Consultations During Hospital Stay:  · None    Procedures Performed:   · None    Significant Findings / Test Results:     XR chest 1 view portable   Final Result by Mary Araya MD (11/03 9213)      No acute cardiopulmonary disease  Workstation performed: [id number]         CT abdomen pelvis with contrast   Final Result by Monika Brito DO (11/02 2209)      No acute inflammatory process or suspicious mass identified  Workstation performed: [id number]             Incidental Findings:   · None    Test Results Pending at Discharge (will require follow up):    · Final Blood culture results-> to follow up with PCP     Outpatient Tests Requested:  · Recommend outpatient follow-up with PCP  · Recommend outpatient follow-up with Cardiology  · Recommend repeat BMP/CBC within 7 days of discharge    Complications:  None    Reason for Admission:  Intractable nausea and vomiting    Hospital Course:   Holli Crews is a 80 y o  female patient with a PMH of SSS s/p PPM, HTN, HLD, carotid artery stenosis who originally presented to the hospital on 11/2/2022 due to acute onset of abdominal pain  Patient states she was eating dinner earlier tonight when suddenly she developed epigastric abdominal pain and had 5 episodes of nonbilious nonbloody vomitus  Reports associated nausea  States that she has been having bowel movements, however she is occasionally constipated  Also reports of a sensation of dizziness and lightheadedness when she changes positions from sitting  Reports recent increase in her Coreg  Reports compliance with medications  In ER, lab workup mostly unremarkable  CT Abdomen/Pelvis to acute infectious changes, but did have identified gallstones noted within the gallbladder but no pericholecystic inflammatory changes  Was found with mild hypothermia improving with [employer]  Patient still with nausea and vomiting after Reglan and Zofran  Patient received light IV fluids with gradual improvement and dizziness  Patient's nausea/vomiting gradually improved and diet increased with no further complaints  Orthostatics remained negative  Blood cultures x2 no growth to date a 24 hours  Recommend repeat BMP/CBC within 7 days of discharge  Recommend outpatient follow-up with PCP for continue management  With history of severe aortic stenosis and complaints of dizziness  Recommend outpatient follow-up with Cardiology for further evaluation and management  Patient medically optimized prior to discharge  Discussed plan with patient and patient's daughter at bedside  All questions were answered  Please see above list of diagnoses and related plan for additional information  Condition at Discharge: stable    Discharge Day Visit / Exam:   Subjective:  Patient states she feels great today  Patient denies any further complaints of nausea/vomiting/or episodes of dizziness  Patient denies any active chest pain, shortness a breath, or abdominal pain      Vitals: Blood Pressure: 140/81 (11/04/22 0955)  Pulse: 98 (11/04/22 0955)  Temperature: 98 1 °F (36 7 °C) (11/04/22 0754)  Temp Source: Oral (11/04/22 0754)  Respirations: 12 (11/04/22 0754)  Height: 5' 3" (160 cm) (11/02/22 2003)  Weight - Scale: 74 8 kg (165 lb) (11/04/22 0600)  SpO2: 93 % (11/04/22 0754)  Exam:   Physical Exam  Vitals and nursing note reviewed  Constitutional:       General: She is not in acute distress  Appearance: She is obese  HENT:      Head: Normocephalic  Nose: Nose normal  No congestion  Mouth/Throat:      Mouth: Mucous membranes are moist       Pharynx: Oropharynx is clear  Cardiovascular:      Rate and Rhythm: Normal rate and regular rhythm  Pulses: Normal pulses  Heart sounds: Murmur heard  Pulmonary:      Effort: Pulmonary effort is normal  No respiratory distress  Breath sounds: Decreased breath sounds present  Abdominal:      General: Bowel sounds are normal  There is no distension  Palpations: Abdomen is soft  Tenderness: There is no abdominal tenderness  Musculoskeletal:         General: Normal range of motion  Cervical back: Normal range of motion  Right lower leg: Edema (Non Pitting, Baseline) present  Left lower leg: Edema (Non Pitting, Baseline) present  Skin:     General: Skin is warm and dry  Capillary Refill: Capillary refill takes less than 2 seconds  Neurological:      Mental Status: She is alert and oriented to person, place, and time  Mental status is at baseline  Motor: No weakness  Psychiatric:         Mood and Affect: Mood normal          Speech: Speech normal          Behavior: Behavior is cooperative  Discussion with Family: Updated  (daughter) at bedside  Discharge instructions/Information to patient and family:   See after visit summary for information provided to patient and family        Provisions for Follow-Up Care:  See after visit summary for information related to follow-up care and any pertinent home health orders  Disposition:   Home    Planned Readmission: No     Discharge Statement:  I spent 60 minutes discharging the patient  This time was spent on the day of discharge  I had direct contact with the patient on the day of discharge  Greater than 50% of the total time was spent examining patient, answering all patient questions, arranging and discussing plan of care with patient as well as directly providing post-discharge instructions  Additional time then spent on discharge activities  Discharge Medications:  See after visit summary for reconciled discharge medications provided to patient and/or family        **Please Note: This note may have been constructed using a voice recognition system**

## 2022-11-04 NOTE — ASSESSMENT & PLAN NOTE
Presents with 5 episodes of NBNB vomitus starting today while eating dinner  W/ associated epigastric pain  Is having BMs but reports occasional constipation  · Laboratory workup unremarkable - normal LFTs, lipase  · CT abdomen pelvis without acute findings - gallstones noted within GB but no pericholecystic inflammatory changes  · ECG nonischemic, hs troponin negative    · BC x2: NGTD (24 hours)  · S/p IV fluid hydration, antiemetics, PPI  · Advanced diet with no further complaints of N/V  · Patient has not required any anti nausea medications  · Repeat EKG today showed QTC less than 500, on discharge will probably send patient home with Zofran to be taken p r n    · Recommend outpatient follow up with PCP

## 2022-11-04 NOTE — PLAN OF CARE
Problem: MOBILITY - ADULT  Goal: Maintain or return to baseline ADL function  Description: INTERVENTIONS:  -  Assess patient's ability to carry out ADLs; assess patient's baseline for ADL function and identify physical deficits which impact ability to perform ADLs (bathing, care of mouth/teeth, toileting, grooming, dressing, etc )  - Assess/evaluate cause of self-care deficits   - Assess range of motion  - Assess patient's mobility; develop plan if impaired  - Assess patient's need for assistive devices and provide as appropriate  - Encourage maximum independence but intervene and supervise when necessary  - Involve family in performance of ADLs  - Assess for home care needs following discharge   - Consider OT consult to assist with ADL evaluation and planning for discharge  - Provide patient education as appropriate  Outcome: Progressing  Goal: Maintains/Returns to pre admission functional level  Description: INTERVENTIONS:  - Perform BMAT or MOVE assessment daily    - Set and communicate daily mobility goal to care team and patient/family/caregiver  - Collaborate with rehabilitation services on mobility goals if consulted  - Perform Range of Motion 3 times a day  - Reposition patient every 2 hours    - Dangle patient 3 times a day  - Stand patient 3 times a day  - Ambulate patient 3 times a day  - Out of bed to chair 3 times a day   - Out of bed for meals 3 times a day  - Out of bed for toileting  - Record patient progress and toleration of activity level   Outcome: Progressing     Problem: Potential for Falls  Goal: Patient will remain free of falls  Description: INTERVENTIONS:  - Educate patient/family on patient safety including physical limitations  - Instruct patient to call for assistance with activity   - Consult OT/PT to assist with strengthening/mobility   - Keep Call bell within reach  - Keep bed low and locked with side rails adjusted as appropriate  - Keep care items and personal belongings within reach  - Initiate and maintain comfort rounds  - Make Fall Risk Sign visible to staff  - Offer Toileting every 2 Hours, in advance of need  - Initiate/Maintain bed alarm  - Obtain necessary fall risk management equipment:   - Apply yellow socks and bracelet for high fall risk patients  - Consider moving patient to room near nurses station  Outcome: Progressing     Problem: Prexisting or High Potential for Compromised Skin Integrity  Goal: Skin integrity is maintained or improved  Description: INTERVENTIONS:  - Identify patients at risk for skin breakdown  - Assess and monitor skin integrity  - Assess and monitor nutrition and hydration status  - Monitor labs   - Assess for incontinence   - Turn and reposition patient  - Assist with mobility/ambulation  - Relieve pressure over bony prominences  - Avoid friction and shearing  - Provide appropriate hygiene as needed including keeping skin clean and dry  - Evaluate need for skin moisturizer/barrier cream  - Collaborate with interdisciplinary team   - Patient/family teaching  - Consider wound care consult   Outcome: Progressing

## 2022-11-04 NOTE — ASSESSMENT & PLAN NOTE
· Continue home medication:  · Norvasc, Coreg, ACE-I  · Norvasc and ACE-inhibitor switched to to q h s   As taking at night could help with daytime dizziness

## 2022-11-04 NOTE — PLAN OF CARE
Problem: MOBILITY - ADULT  Goal: Maintain or return to baseline ADL function  Description: INTERVENTIONS:  -  Assess patient's ability to carry out ADLs; assess patient's baseline for ADL function and identify physical deficits which impact ability to perform ADLs (bathing, care of mouth/teeth, toileting, grooming, dressing, etc )  - Assess/evaluate cause of self-care deficits   - Assess range of motion  - Assess patient's mobility; develop plan if impaired  - Assess patient's need for assistive devices and provide as appropriate  - Encourage maximum independence but intervene and supervise when necessary  - Involve family in performance of ADLs  - Assess for home care needs following discharge   - Consider OT consult to assist with ADL evaluation and planning for discharge  - Provide patient education as appropriate  Outcome: Progressing  Goal: Maintains/Returns to pre admission functional level  Description: INTERVENTIONS:  - Perform BMAT or MOVE assessment daily    - Set and communicate daily mobility goal to care team and patient/family/caregiver  - Collaborate with rehabilitation services on mobility goals if consulted  - Perform Range of Motion 4 times a day  - Reposition patient every 2 hours    - Dangle patient 3 times a day  - Stand patient 3 times a day  - Ambulate patient 3 times a day  - Out of bed to chair 3 times a day   - Out of bed for meals 3 times a day  - Out of bed for toileting  - Record patient progress and toleration of activity level   Outcome: Progressing     Problem: Potential for Falls  Goal: Patient will remain free of falls  Description: INTERVENTIONS:  - Educate patient/family on patient safety including physical limitations  - Instruct patient to call for assistance with activity   - Consult OT/PT to assist with strengthening/mobility   - Keep Call bell within reach  - Keep bed low and locked with side rails adjusted as appropriate  - Keep care items and personal belongings within reach  - Initiate and maintain comfort rounds  - Make Fall Risk Sign visible to staff  - Offer Toileting every 2 Hours, in advance of need  - Initiate/Maintain alarm  - Obtain necessary fall risk management equipment:   - Apply yellow socks and bracelet for high fall risk patients  - Consider moving patient to room near nurses station  Outcome: Progressing     Problem: Prexisting or High Potential for Compromised Skin Integrity  Goal: Skin integrity is maintained or improved  Description: INTERVENTIONS:  - Identify patients at risk for skin breakdown  - Assess and monitor skin integrity  - Assess and monitor nutrition and hydration status  - Monitor labs   - Assess for incontinence   - Turn and reposition patient  - Assist with mobility/ambulation  - Relieve pressure over bony prominences  - Avoid friction and shearing  - Provide appropriate hygiene as needed including keeping skin clean and dry  - Evaluate need for skin moisturizer/barrier cream  - Collaborate with interdisciplinary team   - Patient/family teaching  - Consider wound care consult   Outcome: Progressing

## 2022-11-06 LAB
BACTERIA BLD CULT: NORMAL
BACTERIA BLD CULT: NORMAL

## 2022-11-07 LAB
BACTERIA BLD CULT: NORMAL
BACTERIA BLD CULT: NORMAL

## 2022-11-08 ENCOUNTER — OFFICE VISIT (OUTPATIENT)
Dept: INTERNAL MEDICINE CLINIC | Facility: CLINIC | Age: 87
End: 2022-11-08

## 2022-11-08 VITALS
WEIGHT: 165 LBS | SYSTOLIC BLOOD PRESSURE: 138 MMHG | DIASTOLIC BLOOD PRESSURE: 70 MMHG | RESPIRATION RATE: 16 BRPM | HEART RATE: 78 BPM | TEMPERATURE: 98.2 F | BODY MASS INDEX: 26.52 KG/M2 | HEIGHT: 66 IN | OXYGEN SATURATION: 98 %

## 2022-11-08 DIAGNOSIS — I35.0 AORTIC VALVE STENOSIS, ETIOLOGY OF CARDIAC VALVE DISEASE UNSPECIFIED: ICD-10-CM

## 2022-11-08 DIAGNOSIS — Z76.89 ENCOUNTER FOR SUPPORT AND COORDINATION OF TRANSITION OF CARE: Primary | ICD-10-CM

## 2022-11-08 DIAGNOSIS — R42 DIZZINESS: ICD-10-CM

## 2022-11-08 DIAGNOSIS — E78.2 MIXED HYPERLIPIDEMIA: ICD-10-CM

## 2022-11-08 DIAGNOSIS — D64.89 ANEMIA DUE TO OTHER CAUSE, NOT CLASSIFIED: ICD-10-CM

## 2022-11-08 DIAGNOSIS — I10 ESSENTIAL HYPERTENSION: ICD-10-CM

## 2022-11-08 DIAGNOSIS — H61.21 EXCESSIVE CERUMEN IN RIGHT EAR CANAL: ICD-10-CM

## 2022-11-08 DIAGNOSIS — R11.2 NAUSEA AND VOMITING, UNSPECIFIED VOMITING TYPE: ICD-10-CM

## 2022-11-08 DIAGNOSIS — K59.09 OTHER CONSTIPATION: ICD-10-CM

## 2022-11-08 DIAGNOSIS — E03.9 ACQUIRED HYPOTHYROIDISM: ICD-10-CM

## 2022-11-08 DIAGNOSIS — K21.9 GASTROESOPHAGEAL REFLUX DISEASE WITHOUT ESOPHAGITIS: ICD-10-CM

## 2022-11-08 PROBLEM — R11.10 VOMITING: Status: ACTIVE | Noted: 2022-11-08

## 2022-11-08 NOTE — ASSESSMENT & PLAN NOTE
Presently asymptomatic and stable  Has been followed by vascular surgery  Last carotid artery Doppler August 15, 2022 no changes  On baby aspirin

## 2022-11-08 NOTE — ASSESSMENT & PLAN NOTE
Well controlled    Cholesterol 125, triglyceride 88, HDL 49, LDL 68 advised to continue present medications and low-cholesterol low saturated diet

## 2022-11-08 NOTE — ASSESSMENT & PLAN NOTE
No further vomiting  Probably could be secondary to food poisoning  Patient states she ate potato with cheese might have cause symptoms

## 2022-11-08 NOTE — ASSESSMENT & PLAN NOTE
Patient  was advised to decrease portion size  Advised to decrease carb, sugar, cholesterol intake  Advised to exercise as much as she can 3-5 times per week  Advised to lose weight

## 2022-11-08 NOTE — ASSESSMENT & PLAN NOTE
Lab Results   Component Value Date    EGFR 70 11/03/2022    EGFR 45 11/02/2022    EGFR 48 10/08/2022    CREATININE 0 75 11/03/2022    CREATININE 1 08 11/02/2022    CREATININE 1 03 10/08/2022   Renal function stable  Advised to maintain hydration  Will follow electrolytes renal function

## 2022-11-08 NOTE — PROGRESS NOTES
Assessment/Plan:    1  Encounter for support and coordination of transition of care  Assessment & Plan:  She was admitted at [facility] on November 2, 2022  Reviewed her medical record  2  Acquired hypothyroidism  Assessment & Plan:  Her TSH 2 7 so advised to continue same dose of levothyroxine  3  Essential hypertension  Assessment & Plan:  Blood pressure well controlled  Advised to continue present medication  Advised for low-salt diet  4  Dizziness  Assessment & Plan:  Patient states her dizziness is better  Her carvedilol and also amlodipine was decreased  Blood pressure stable and controlled  Was seen by cardiologist recently after discharge  5  Mixed hyperlipidemia  Assessment & Plan:  Well controlled  Cholesterol 125, triglyceride 88, HDL 49, LDL 68 advised to continue present medications and low-cholesterol low saturated diet      6  Anemia due to other cause, not classified  Assessment & Plan:  Her hemoglobin was dropped from 13 1-10 9 upon discharge most likely from hydration  Will follow CBC  7  Other constipation  Assessment & Plan:  Patient complain of constipation  She used to take stool softener in the past   Advised to start Colace 100 mg daily and Senokot 1 tablet daily which she has at home  Once constipation gets better just change only 2 Colace and stop Senokot  Advised to increase fluid and fibers  8  BMI 26 0-26 9,adult  Assessment & Plan:  Patient  was advised to decrease portion size  Advised to decrease carb, sugar, cholesterol intake  Advised to exercise as much as she can 3-5 times per week  Advised to lose weight  9  Aortic valve stenosis, etiology of cardiac valve disease unspecified  Assessment & Plan:  Presently stable  She was seen by her cardiologist Dr Sara Carter, who advised to observe and follow-up        10  Gastroesophageal reflux disease without esophagitis  Assessment & Plan:  Symptoms are well controlled on famotidine p r n  Nicole Serum She has been watching diet  11  Nausea and vomiting, unspecified vomiting type  Assessment & Plan:  No further vomiting  Probably could be secondary to food poisoning  Patient states she ate potato with cheese might have cause symptoms  12  Excessive cerumen in right ear canal  Assessment & Plan:  Advised to ENT specialist             Subjective:  Patient presents for transition of care management  Patient ID: Lynn Pastrana is a 80 y o  female  HPI   42-year-old white female patient presents for transition of care management after she was hospitalized on November 2, 2022 at [facility]   She was admitted with vomiting and dizziness  She underwent extensive evaluation was unremarkable  Her vomiting resolved  Dizziness better  She was discharged on same medication except her carvedilol and amlodipine dose was decreased  Patient states she was seen by her cardiologist after discharge  She is feeling better except now complain of constipation  Denies any vomiting, diarrhea, pain in abdomen  Denies chest pain, shortness of breath denies fever chills cough      The following portions of the patient's history were reviewed and updated as appropriate:     Past Medical History:  She has a past medical history of Allergic rhinitis, Aortic stenosis, moderate, Bleeding per rectum, Blood glucose elevated, BMI 26 0-26 9,adult (11/8/2022), BMI 29 0-29 9,adult (10/19/2022), BMI 30 0-30 9,adult (10/14/2021), BMI 32 0-32 9,adult (2/24/2021), Carotid arterial disease ([facility] [address] ), Colonoscopy refused, Conjunctivitis, allergic, Constipation, Disease of thyroid gland, Elbow fracture, Encounter for support and coordination of transition of care (11/8/2022), Excessive cerumen in right ear canal (11/8/2022), GERD (gastroesophageal reflux disease), Herpes zoster, Hyperlipidemia, Hypertension, Medicare annual wellness visit, subsequent (10/14/2021), Medicare annual wellness visit, subsequent (10/19/2022), Osteoarthritis of multiple joints (1/14/2022), Other constipation (11/8/2022), Other specified anemias (11/8/2022), Pacemaker, Post herpetic neuralgia, Skin rash (5/26/2021), Stage 3a chronic kidney disease ([address] ) (1/14/2022), and Vomiting (11/8/2022)  ,  _______________________________________________________________________  Past Surgical History:   has a past surgical history that includes Joint replacement; Tubal ligation; Cardiac pacemaker placement; and Total hip arthroplasty (Left, 02/2015)  ,  _______________________________________________________________________  Family History:  family history includes Cancer in her father; Heart disease in her mother ,  _______________________________________________________________________  Social History:   reports that she has never smoked  She has never used smokeless tobacco  She reports that she does not drink alcohol and does not use drugs  ,  _______________________________________________________________________  Allergies:  is allergic to ampicillin, codeine, and penicillins     _______________________________________________________________________  Current Outpatient Medications   Medication Sig Dispense Refill   • amLODIPine (NORVASC) 5 mg tablet Take 1 tablet (5 mg total) by mouth daily at bedtime  0   • aspirin 81 mg chewable tablet Chew 81 mg daily     • carvedilol (COREG) 12 5 mg tablet Take 1 tablet (12 5 mg total) by mouth 2 (two) times a day with meals 60 tablet 0   • enalapril (VASOTEC) 20 mg tablet Take 20 mg by mouth 2 (two) times a day     • ezetimibe (ZETIA) 10 mg tablet Take 10 mg by mouth daily     • famotidine (PEPCID) 20 mg tablet Take 20 mg by mouth as needed     • latanoprost (XALATAN) 0 005 % ophthalmic solution INSTILL 1 DROP IN EACH EYE AT BEDTIME     • levothyroxine 50 mcg tablet TAKE 1 TABLET BY MOUTH EVERY DAY 30 tablet 2   • ondansetron (Zofran ODT) 4 mg disintegrating tablet Take 1 tablet (4 mg total) by mouth every 6 (six) hours as needed for nausea or vomiting 20 tablet 0   • pravastatin (PRAVACHOL) 40 mg tablet Take 40 mg by mouth daily at bedtime Mon,Friday x2, one ever other day x1       No current facility-administered medications for this visit      _______________________________________________________________________  Review of Systems   Constitutional: Negative for chills and fever  HENT: Negative for congestion, ear pain, hearing loss, nosebleeds, sinus pain, sore throat and trouble swallowing  Eyes: Negative for discharge, redness and visual disturbance  Respiratory: Negative for cough, chest tightness and shortness of breath  Cardiovascular: Negative for chest pain and palpitations  Gastrointestinal: Positive for constipation  Negative for abdominal pain, blood in stool, diarrhea, nausea and vomiting  Genitourinary: Negative for dysuria, flank pain, frequency and hematuria  Musculoskeletal: Negative for arthralgias, myalgias and neck pain  Skin: Negative for color change and rash  Neurological: Positive for dizziness (Now sometime gets dizziness but much better  )  Negative for speech difficulty, weakness and headaches  Hematological: Does not bruise/bleed easily  Psychiatric/Behavioral: Negative for agitation and behavioral problems  Objective:  Vitals:    11/08/22 1125   BP: 138/70   BP Location: Left arm   Patient Position: Sitting   Cuff Size: Adult   Pulse: 78   Resp: 16   Temp: 98 2 °F (36 8 °C)   TempSrc: Tympanic   SpO2: 98%   Weight: 74 8 kg (165 lb)   Height: 5' 6" (1 676 m)     Body mass index is 26 63 kg/m²  Physical Exam  Vitals and nursing note reviewed  Constitutional:       General: She is not in acute distress  Appearance: Normal appearance  HENT:      Head: Normocephalic and atraumatic        Right Ear: Ear canal and external ear normal       Left Ear: Tympanic membrane, ear canal and external ear normal       Ears: Comments: Has cerumen in right ear  No redness no discharge  Nose: Nose normal       Mouth/Throat:      Mouth: Mucous membranes are moist    Eyes:      General: No scleral icterus  Right eye: No discharge  Left eye: No discharge  Extraocular Movements: Extraocular movements intact  Conjunctiva/sclera: Conjunctivae normal    Cardiovascular:      Rate and Rhythm: Normal rate and regular rhythm  Pulses: Normal pulses  Heart sounds: Murmur heard  Pulmonary:      Effort: Pulmonary effort is normal  No respiratory distress  Breath sounds: Normal breath sounds  Abdominal:      General: Bowel sounds are normal  There is no distension  Palpations: Abdomen is soft  Tenderness: There is no abdominal tenderness  Musculoskeletal:         General: Normal range of motion  Cervical back: Normal range of motion and neck supple  No muscular tenderness  Right lower leg: No edema  Left lower leg: No edema  Skin:     General: Skin is warm  Findings: No rash  Neurological:      General: No focal deficit present  Mental Status: She is alert and oriented to person, place, and time  Motor: No weakness  Psychiatric:         Mood and Affect: Mood normal          Behavior: Behavior normal          TCM Call     Date and time call was made  11/4/2022  3:23 PM    Hospital care reviewed  Records reviewed    Patient was hospitialized at  [address] [place] [address]    Date of Admission  11/02/22    Date of discharge  11/04/22    Diagnosis  nausea and vomiting    Disposition  Home    Were the patients medications reviewed and updated  No    Current Symptoms  None      TCM Call     Post hospital issues  None    Should patient be enrolled in anticoag monitoring? No    Scheduled for follow up?   No    Did you obtain your prescribed medications  Yes    Do you need help managing your prescriptions or medications  No    Is transportation to your appointment needed No    I have advised the patient to call PCP with any new or worsening symptoms  Abigail Beckham/ANTHONY    Living Arrangements  Family members    Are you recieving any outpatient services  No    Are you recieving home care services  No    Are you using any community resources  No    Current waiver services  No    Have you fallen in the last 12 months  No    Interperter language line needed  No    Counseling  Patient

## 2022-11-08 NOTE — ASSESSMENT & PLAN NOTE
Her hemoglobin was dropped from 13 1-10 9 upon discharge most likely from hydration  Will follow CBC

## 2022-11-08 NOTE — ASSESSMENT & PLAN NOTE
Patient states her dizziness is better  Her carvedilol and also amlodipine was decreased  Blood pressure stable and controlled  Was seen by cardiologist recently after discharge

## 2022-11-08 NOTE — ASSESSMENT & PLAN NOTE
Patient complain of constipation  She used to take stool softener in the past   Advised to start Colace 100 mg daily and Senokot 1 tablet daily which she has at home  Once constipation gets better just change only 2 Colace and stop Senokot  Advised to increase fluid and fibers

## 2022-11-08 NOTE — ASSESSMENT & PLAN NOTE
Presently stable  She was seen by her cardiologist Dr Barrett Trevino, who advised to observe and follow-up

## 2022-11-08 NOTE — PATIENT INSTRUCTIONS
Patient was advised to continue present medications  discussed with the patient medications and laboratory data in detail  Follow-up with me  as advised  If any blood test was ordered please do 1 week prior to next appointment unless advise to get earlier    If you have any questions please call the office [phone]

## 2022-11-12 ENCOUNTER — APPOINTMENT (OUTPATIENT)
Dept: LAB | Facility: CLINIC | Age: 87
End: 2022-11-12

## 2022-11-12 DIAGNOSIS — R11.2 NAUSEA AND VOMITING, UNSPECIFIED VOMITING TYPE: ICD-10-CM

## 2022-11-12 LAB
ANION GAP SERPL CALCULATED.3IONS-SCNC: 5 MMOL/L (ref 4–13)
BUN SERPL-MCNC: 16 MG/DL (ref 5–25)
CALCIUM SERPL-MCNC: 9.5 MG/DL (ref 8.4–10.2)
CHLORIDE SERPL-SCNC: 108 MMOL/L (ref 96–108)
CO2 SERPL-SCNC: 27 MMOL/L (ref 21–32)
CREAT SERPL-MCNC: 0.92 MG/DL (ref 0.6–1.3)
ERYTHROCYTE [DISTWIDTH] IN BLOOD BY AUTOMATED COUNT: 13 % (ref 11.6–15.1)
GFR SERPL CREATININE-BSD FRML MDRD: 55 ML/MIN/1.73SQ M
GLUCOSE P FAST SERPL-MCNC: 93 MG/DL (ref 65–99)
HCT VFR BLD AUTO: 38.4 % (ref 34.8–46.1)
HGB BLD-MCNC: 12.1 G/DL (ref 11.5–15.4)
MCH RBC QN AUTO: 31.1 PG (ref 26.8–34.3)
MCHC RBC AUTO-ENTMCNC: 31.5 G/DL (ref 31.4–37.4)
MCV RBC AUTO: 99 FL (ref 82–98)
PLATELET # BLD AUTO: 232 THOUSANDS/UL (ref 149–390)
PMV BLD AUTO: 10.1 FL (ref 8.9–12.7)
POTASSIUM SERPL-SCNC: 4.6 MMOL/L (ref 3.5–5.3)
RBC # BLD AUTO: 3.89 MILLION/UL (ref 3.81–5.12)
SODIUM SERPL-SCNC: 140 MMOL/L (ref 135–147)
WBC # BLD AUTO: 5.17 THOUSAND/UL (ref 4.31–10.16)

## 2022-12-18 PROBLEM — Z00.00 MEDICARE ANNUAL WELLNESS VISIT, SUBSEQUENT: Status: RESOLVED | Noted: 2022-10-19 | Resolved: 2022-12-18

## 2022-12-31 DIAGNOSIS — E03.9 ACQUIRED HYPOTHYROIDISM: ICD-10-CM

## 2023-01-03 RX ORDER — LEVOTHYROXINE SODIUM 0.05 MG/1
TABLET ORAL
Qty: 30 TABLET | Refills: 2 | Status: SHIPPED | OUTPATIENT
Start: 2023-01-03

## 2023-01-07 PROBLEM — H61.21 EXCESSIVE CERUMEN IN RIGHT EAR CANAL: Status: RESOLVED | Noted: 2022-11-08 | Resolved: 2023-01-07

## 2023-01-10 ENCOUNTER — RA CDI HCC (OUTPATIENT)
Dept: OTHER | Facility: HOSPITAL | Age: 88
End: 2023-01-10

## 2023-01-10 NOTE — PROGRESS NOTES
[address]  coding opportunities       Chart reviewed, no opportunity found: CHART REVIEWED, NO OPPORTUNITY FOUND        Patients Insurance     Medicare Insurance: Medicare

## 2023-01-18 ENCOUNTER — OFFICE VISIT (OUTPATIENT)
Dept: INTERNAL MEDICINE CLINIC | Facility: CLINIC | Age: 88
End: 2023-01-18

## 2023-01-18 VITALS
SYSTOLIC BLOOD PRESSURE: 140 MMHG | WEIGHT: 163 LBS | TEMPERATURE: 98.6 F | BODY MASS INDEX: 30 KG/M2 | HEIGHT: 62 IN | RESPIRATION RATE: 16 BRPM | DIASTOLIC BLOOD PRESSURE: 82 MMHG | OXYGEN SATURATION: 98 % | HEART RATE: 78 BPM

## 2023-01-18 DIAGNOSIS — E78.2 MIXED HYPERLIPIDEMIA: Primary | ICD-10-CM

## 2023-01-18 DIAGNOSIS — I65.23 CAROTID STENOSIS, ASYMPTOMATIC, BILATERAL: ICD-10-CM

## 2023-01-18 DIAGNOSIS — E03.9 ACQUIRED HYPOTHYROIDISM: ICD-10-CM

## 2023-01-18 DIAGNOSIS — K59.09 OTHER CONSTIPATION: ICD-10-CM

## 2023-01-18 DIAGNOSIS — K21.9 GASTROESOPHAGEAL REFLUX DISEASE WITHOUT ESOPHAGITIS: ICD-10-CM

## 2023-01-18 DIAGNOSIS — Z79.899 HIGH RISK MEDICATION USE: ICD-10-CM

## 2023-01-18 DIAGNOSIS — N18.31 STAGE 3A CHRONIC KIDNEY DISEASE (HCC): ICD-10-CM

## 2023-01-18 DIAGNOSIS — I10 ESSENTIAL HYPERTENSION: ICD-10-CM

## 2023-01-18 DIAGNOSIS — I35.0 AORTIC VALVE STENOSIS, ETIOLOGY OF CARDIAC VALVE DISEASE UNSPECIFIED: ICD-10-CM

## 2023-01-18 RX ORDER — DOCUSATE SODIUM 100 MG/1
100 CAPSULE, LIQUID FILLED ORAL AS NEEDED
COMMUNITY

## 2023-01-18 NOTE — ASSESSMENT & PLAN NOTE
Well-controlled  Cholesterol 145, triglyceride 88, HDL 59, LDL 61  Advised to continue present medication pravastatin    Continue low-cholesterol diet

## 2023-01-18 NOTE — ASSESSMENT & PLAN NOTE
Lab Results   Component Value Date    EGFR 55 11/12/2022    EGFR 70 11/03/2022    EGFR 45 11/02/2022    CREATININE 0 92 11/12/2022    CREATININE 0 75 11/03/2022    CREATININE 1 08 11/02/2022   Her renal function stable  Advised to maintain hydration  Continue present medications

## 2023-01-18 NOTE — ASSESSMENT & PLAN NOTE
Presently she is asymptomatic and stable  She has been followed by vascular surgery  And she is getting carotid Doppler as well  On baby aspirin and statin therapy

## 2023-01-18 NOTE — PATIENT INSTRUCTIONS
Patient was advised to continue present medications  discussed with the patient medications and laboratory data in detail  Follow-up with me in 3 months or as advised  If any blood test was ordered please do 1 week prior to next appointment unless advise to get earlier    If you have any questions please call the office [phone]

## 2023-01-18 NOTE — ASSESSMENT & PLAN NOTE
Presently stable asymptomatic  Patient has been followed by cardiologist   Continue present medications

## 2023-01-18 NOTE — PROGRESS NOTES
Assessment/Plan:    1  Mixed hyperlipidemia  Assessment & Plan:  Well-controlled  Cholesterol 145, triglyceride 88, HDL 59, LDL 61  Advised to continue present medication pravastatin  Continue low-cholesterol diet    Orders:  -     Lipid panel; Future  -     Comprehensive metabolic panel; Future    2  Essential hypertension  Assessment & Plan:  Blood pressure well controlled  Advised to continue present medication  Advised for low-salt diet  Orders:  -     Lipid panel; Future  -     Comprehensive metabolic panel; Future    3  Acquired hypothyroidism  Assessment & Plan:  Her TSH is 2 7 so advised to continue same dose of levothyroxine  We will follow TSH  Orders:  -     TSH, 3rd generation; Future    4  Gastroesophageal reflux disease without esophagitis  Assessment & Plan:  She takes famotidine as needed which is effective and she has been watching her diet as well  5  Carotid stenosis, asymptomatic, bilateral  Assessment & Plan:  Presently she is asymptomatic and stable  She has been followed by vascular surgery  And she is getting carotid Doppler as well  On baby aspirin and statin therapy  6  Aortic valve stenosis, etiology of cardiac valve disease unspecified  Assessment & Plan:  Presently stable asymptomatic  Patient has been followed by cardiologist   Continue present medications  7  Other constipation  Assessment & Plan:  She takes docusate sodium as needed which is effective  Advised to maintain hydration  8  BMI 29 0-29 9,adult  Assessment & Plan:  Slowly she is losing weight  Her weight was 171 pounds in May 2021  Now 163lbs she has been decreasing her calorie intake  And trying to walk as much as she can  9  High risk medication use  -     Lipid panel; Future  -     Comprehensive metabolic panel; Future    10   Stage 3a chronic kidney disease [facility])  Assessment & Plan:  Lab Results   Component Value Date    EGFR 55 11/12/2022    EGFR 70 11/03/2022    EGFR 45 11/02/2022    CREATININE 0 92 11/12/2022    CREATININE 0 75 11/03/2022    CREATININE 1 08 11/02/2022   Her renal function stable  Advised to maintain hydration  Continue present medications  Orders:  -     Comprehensive metabolic panel; Future          Subjective: Patient presents for follow-up  Patient ID: Rosalina Alejandra is a 80 y o  female  HPI   75-year-old white female patient presents for follow-up of medical problems  She denies any chest pain, shortness of breath, pain in abdomen  Denies any cough, fever, chills  Denies any nausea, vomiting, diarrhea  Her constipation is better on medication she takes as needed  No further dizziness since dose of her blood pressure medications changed  Overall she is stable and no new problems  The following portions of the patient's history were reviewed and updated as appropriate:     Past Medical History:  She has a past medical history of Allergic rhinitis, Aortic stenosis, moderate, Bleeding per rectum, Blood glucose elevated, BMI 26 0-26 9,adult (11/8/2022), BMI 29 0-29 9,adult (10/19/2022), BMI 30 0-30 9,adult (10/14/2021), BMI 32 0-32 9,adult (2/24/2021), Carotid arterial disease ([facility] [address] ), Colonoscopy refused, Conjunctivitis, allergic, Constipation, Disease of thyroid gland, Elbow fracture, Encounter for support and coordination of transition of care (11/8/2022), Excessive cerumen in right ear canal (11/8/2022), GERD (gastroesophageal reflux disease), Herpes zoster, Hyperlipidemia, Hypertension, Medicare annual wellness visit, subsequent (10/14/2021), Medicare annual wellness visit, subsequent (10/19/2022), Osteoarthritis of multiple joints (1/14/2022), Other constipation (11/8/2022), Other specified anemias (11/8/2022), Pacemaker, Post herpetic neuralgia, Skin rash (5/26/2021), Stage 3a chronic kidney disease ([address] ) (1/14/2022), and Vomiting (11/8/2022)  ,  _______________________________________________________________________  Past Surgical History:   has a past surgical history that includes Joint replacement; Tubal ligation; Cardiac pacemaker placement; and Total hip arthroplasty (Left, 02/2015)  ,  _______________________________________________________________________  Family History:  family history includes Cancer in her father; Heart disease in her mother ,  _______________________________________________________________________  Social History:   reports that she has never smoked  She has never used smokeless tobacco  She reports that she does not drink alcohol and does not use drugs  ,  _______________________________________________________________________  Allergies:  is allergic to ampicillin, codeine, and penicillins     _______________________________________________________________________  Current Outpatient Medications   Medication Sig Dispense Refill   • amLODIPine (NORVASC) 5 mg tablet Take 1 tablet (5 mg total) by mouth daily at bedtime  0   • aspirin 81 mg chewable tablet Chew 81 mg daily     • carvedilol (COREG) 12 5 mg tablet Take 1 tablet (12 5 mg total) by mouth 2 (two) times a day with meals 60 tablet 0   • docusate sodium (COLACE) 100 mg capsule Take 100 mg by mouth as needed     • enalapril (VASOTEC) 20 mg tablet Take 20 mg by mouth 2 (two) times a day     • ezetimibe (ZETIA) 10 mg tablet Take 10 mg by mouth daily     • famotidine (PEPCID) 20 mg tablet Take 20 mg by mouth as needed     • latanoprost (XALATAN) 0 005 % ophthalmic solution INSTILL 1 DROP IN EACH EYE AT BEDTIME     • levothyroxine 50 mcg tablet TAKE 1 TABLET BY MOUTH EVERY DAY 30 tablet 2   • pravastatin (PRAVACHOL) 40 mg tablet Take 40 mg by mouth daily at bedtime Mon,Friday x2, one ever other day x1       No current facility-administered medications for this visit      _______________________________________________________________________  Review of Systems   Constitutional: Negative for chills and fever     HENT: Negative for congestion, ear pain, hearing loss, nosebleeds, sinus pain, sore throat and trouble swallowing  Eyes: Negative for discharge, redness and visual disturbance  Respiratory: Negative for cough, chest tightness and shortness of breath  Cardiovascular: Negative for chest pain and palpitations  Gastrointestinal: Negative for abdominal pain, blood in stool, constipation, diarrhea, nausea and vomiting  Genitourinary: Negative for dysuria, flank pain and hematuria  Musculoskeletal: Positive for arthralgias ( Sometimes gets pain in the knees)  Negative for myalgias and neck pain  Skin: Negative for color change and rash  Neurological: Negative for dizziness, speech difficulty, weakness and headaches  Hematological: Does not bruise/bleed easily  Psychiatric/Behavioral: Negative for agitation and behavioral problems  Objective:  Vitals:    01/18/23 0834   BP: 140/82   BP Location: Left arm   Patient Position: Sitting   Cuff Size: Adult   Pulse: 78   Resp: 16   Temp: 98 6 °F (37 °C)   TempSrc: Tympanic   SpO2: 98%   Weight: 73 9 kg (163 lb)   Height: 5' 2" (1 575 m)     Body mass index is 29 81 kg/m²  Physical Exam  Vitals and nursing note reviewed  Constitutional:       General: She is not in acute distress  Appearance: Normal appearance  HENT:      Head: Normocephalic and atraumatic  Right Ear: Ear canal and external ear normal       Left Ear: Ear canal and external ear normal       Nose: Nose normal       Mouth/Throat:      Mouth: Mucous membranes are moist    Eyes:      General: No scleral icterus  Right eye: No discharge  Left eye: No discharge  Extraocular Movements: Extraocular movements intact  Conjunctiva/sclera: Conjunctivae normal    Cardiovascular:      Rate and Rhythm: Normal rate and regular rhythm  Pulses: Normal pulses  Heart sounds: Murmur heard  Pulmonary:      Effort: Pulmonary effort is normal  No respiratory distress  Breath sounds: Normal breath sounds  Abdominal:      General: Bowel sounds are normal       Palpations: Abdomen is soft  Tenderness: There is no abdominal tenderness  Musculoskeletal:         General: Normal range of motion  Cervical back: Normal range of motion and neck supple  No muscular tenderness  Right lower leg: No edema  Left lower leg: No edema  Skin:     General: Skin is warm  Findings: No rash  Neurological:      General: No focal deficit present  Mental Status: She is alert and oriented to person, place, and time  Motor: No weakness  Coordination: Coordination normal    Psychiatric:         Mood and Affect: Mood normal          Behavior: Behavior normal          I spent 30 minutes with the patient today    More than 50% time spent for reviewing of external notes, reviewing of the results of diagnostics test, management of care, patient education and ordering of test

## 2023-01-18 NOTE — ASSESSMENT & PLAN NOTE
Slowly she is losing weight  Her weight was 171 pounds in May 2021  Now 163lbs she has been decreasing her calorie intake  And trying to walk as much as she can

## 2023-02-27 ENCOUNTER — TRANSCRIBE ORDERS (OUTPATIENT)
Dept: VASCULAR SURGERY | Facility: CLINIC | Age: 88
End: 2023-02-27

## 2023-02-27 DIAGNOSIS — I65.23 CAROTID STENOSIS, ASYMPTOMATIC, BILATERAL: Primary | ICD-10-CM

## 2023-03-04 NOTE — PROGRESS NOTES
CONSULTATION NOTE - GENERAL NEUROLOGY    CHIEF COMPLAINT    Mental status change    HISTORY OF PRESENT ILLNESS    73 year old year old,female, seen in neurologic consultation on 3/4/2023.  Chart reviewed, patient seen in the ultrasound area lying in the bed comfortably, ready to get ultrasound abdomen/pelvis.  At this moment patient denies any complaints, she is fully awake alert and well oriented.  According to history patient has recent ureteric obstruction, vaginal bleed, history for end-stage renal disease, with UTI and some anemia, status post previous left BKA and right toe amputation, was noticed to be somewhat confused last night but fully back to normal this morning  Patient denies focal weakness, headache or dizziness or neck pain    REVIEW OF SYSTEMS :     A comprehensive review of systems was performed, all negative except as per HPI.     HISTORIES:    Past Medical History:   Diagnosis Date   • Chronic kidney disease    • Congestive cardiac failure (CMD)    • COVID     2021   • Diabetes mellitus (CMD)    • Dialysis patient (CMD)    • Essential (primary) hypertension    • Gastroesophageal reflux disease    • Kidney transplant recipient    • Thyroid condition        Past Surgical History:   Procedure Laterality Date   • Av fistula placement Left    • Cardiac pacemaker placement     • Cholecystectomy      GALLSTONES REMOVED   • Eye surgery Bilateral     CATARACTS   • Joint replacement Left     KNEE   • Peritoneal catheter insertion     • Thyroidectomy      PARTIAL   • Toe amputation Bilateral     All LEFT toes amputated. Great toe on RIGHT also Amputated   • Tubal ligation         Family History   Problem Relation Age of Onset   • Diabetes Mother    • Parkinsonism Father    • Sarcoidosis Brother    • Diabetes Maternal Grandmother        Social History     Tobacco Use   • Smoking status: Never   • Smokeless tobacco: Never   Substance Use Topics   • Alcohol use: Yes     Comment: OCCASSIONAL   • Drug use:  Chole 128  Progress Note - Vera Saltness 9/12/1933, 80 y o  female MRN: [id number]  Unit/Bed#: -Thor Encounter: [id number]  Primary Care Provider: Sofía Perez MD   Date and time admitted to hospital: 11/2/2022  8:02 PM    * Intractable nausea and vomiting  Assessment & Plan  Presents with 5 episodes of NBNB vomitus starting today while eating dinner  W/ associated epigastric pain  Is having BMs but reports occasional constipation  · Laboratory workup unremarkable - normal LFTs, lipase  · CT abdomen pelvis without acute findings - gallstones noted within GB but no pericholecystic inflammatory changes  · ECG nonischemic, hs troponin negative    · Continue IV fluid hydration, antiemetics, PPI  · Patient was made NPO overnight and placed on IV fluid hydration  · Advance diet to clear liquids this morning which patient tolerated then increased to surgical soft/light meal which patient has also been tolerating  · Patient has not required any anti nausea medications  · Repeat EKG today showed QTC less than 500, on discharge will probably send patient home with Zofran to be taken p r n  Dizziness  Assessment & Plan  Reports sensation of intermittent dizziness described as lightheaded when changing from sitting to standing  · Suspect orthostasis w/ N/V   · Gentle hydration overnight given N/V & recent increase in BB   · Coreg increased from 12 5mg q a m/25q p m  to 25mg b i d  - continue with prior regimen for now  · Also noted to have b/l carotid artery stenosis - unchanged and monitored by vascular surg q6 months   · Recent Echo 8/2022 showed  Nondilated left ventricle with EF of 60 %  Grade 1 diastolic dysfunction (impaired relaxation) present       Aortic valve is severely calcified with evidence of moderate to severe stenosis and mean gradient of 26 mmHg   Peak gradient is 41 mmHg   MICHELLE is not calculated  · ? If aortic stenosis if stat cause of dizziness  If patient continues to have disease tomorrow will reach out to Cardiology  Patient states that her cardiologist at [facility] told patient that they will monitor for time being  · Orthostatic blood pressure pending  · Telemetry overnight showed paced rhythm  · Was planning on discharging patient today but later in the afternoon after patient use the bathroom she fell dizzy and was having difficulty ambulating requiring assistance to get back to her bed  States that she was straining to have bowel movement and to urinate  Suspect most likely symptoms are vasovagal     · Will keep patient overnight and reassess tomorrow    Acquired hypothyroidism  Assessment & Plan  · Continue levothyroxine   · TSH within normal levels    Essential hypertension  Assessment & Plan  · Continue home Norvasc, Coreg, ACE-I  · Will switch Norvasc and ACE-inhibitor to q h s  As taking at night could help with daytime dizziness    Hypothermia-resolved as of 11/3/2022  Assessment & Plan  · T 34 3 in ED  Improved to 35 2 w/ Theresa oliviagger  · Unclear etiology - does have underlying hypothyroidism  · Does not appear to be infectious etiology   · BCx2 obtained, r/o occult infection  · Check TSH, a m  Cortisol  · Continue Theresa Hugger, temp probe Devine          VTE Pharmacologic Prophylaxis: VTE Score: 4 Moderate Risk (Score 3-4) - Pharmacological DVT Prophylaxis Ordered: heparin  Patient Centered Rounds: I performed bedside rounds with nursing staff today  Discussions with Specialists or Other Care Team Provider: case management    Education and Discussions with Family / Patient: Updated  (daughter) at bedside  Time Spent for Care: 30 minutes  More than 50% of total time spent on counseling and coordination of care as described above      Current Length of Stay: 0 day(s)  Current Patient Status: Inpatient   Certification Statement: The patient will continue to require additional inpatient hospital stay due to Never       INPATIENT MEDICATIONS:    Current Facility-Administered Medications   Medication Dose Route Frequency Provider Last Rate Last Admin   • midodrine (PROAMATINE) tablet 10 mg  10 mg Oral Once Summer Espinoza MD       • midodrine (PROAMATINE) tablet 10 mg  10 mg Oral TID AC Summer Espinoza MD   5 mg at 03/04/23 1032   • vancomycin (VANCOCIN) capsule 125 mg  125 mg Oral 4x Daily Chhaya Clemons MD   125 mg at 03/03/23 2209   • cefepime (MAXIPIME) 1,000 mg in sodium chloride 0.9 % 100 mL IVPB  1,000 mg Intravenous Daily Chhaya Clemons  mL/hr at 03/03/23 2211 1,000 mg at 03/03/23 2211   • DAPTOmycin (CUBICIN) injection 340 mg  6 mg/kg (Adjusted) Intravenous Q48H Chhaya Clemons MD   340 mg at 03/03/23 2258   • atorvastatin (LIPITOR) tablet 10 mg  10 mg Oral Daily Damian Dahl MD   10 mg at 03/04/23 1032   • carvedilol (COREG) tablet 3.125 mg  3.125 mg Oral Daily Damian Dahl MD   3.125 mg at 03/04/23 1033   • cholecalciferol (VITAMIN D) tablet 25 mcg  25 mcg Oral Daily Damian Dahl MD   25 mcg at 03/04/23 1032   • epoetin sp-epbx (RETACRIT) 53159 UNIT/ML injection 10,000 Units  10,000 Units Subcutaneous Once per day on Mon Wed Fri Damian Dahl MD   10,000 Units at 03/03/23 0817   • folic acid (FOLATE) tablet 1 mg  1 mg Oral Daily Damian Dahl MD   1 mg at 03/04/23 1032   • insulin glargine (LANTUS) injection 10 Units  10 Units Subcutaneous Nightly Damian Dahl MD   10 Units at 03/03/23 2208   • levothyroxine (SYNTHROID, LEVOTHROID) tablet 50 mcg  50 mcg Oral Daily Damian Dahl MD   50 mcg at 03/04/23 1032   • nystatin (MYCOSTATIN) powder 1 application.  1 application. Topical 2 times per day Damian Dahl MD   1 application. at 03/04/23 1104   • pantoprazole (PROTONIX) EC tablet 40 mg  40 mg Oral QAM Damian Dahl MD   40 mg at 03/04/23 0708   • heparin (porcine) injection 5,000 Units  5,000 Units Subcutaneous 3 times per day Damian Dahl MD   5,000 Units at 03/04/23  0708      Current Facility-Administered Medications   Medication Dose Route Frequency Provider Last Rate Last Admin   • sodium chloride (NORMAL SALINE) 0.9 % bolus 100-200 mL  100-200 mL Intravenous PRN Summer Espinoza MD       • sodium chloride (NORMAL SALINE) 0.9 % bolus 100-200 mL  100-200 mL Intravenous PRN Summer Espinoza MD       • acetaminophen-codeine (TYLENOL NO.3) 300-30 MG per tablet 1 tablet  1 tablet Oral Q6H PRN Damian Dahl MD       • dextrose 50 % injection 25 g  25 g Intravenous PRN Damian Dahl MD   25 g at 03/04/23 0738   • ondansetron (ZOFRAN) injection 4 mg  4 mg Intravenous Q6H PRN Damian Dahl MD   4 mg at 03/02/23 1343   • acetaminophen (TYLENOL) tablet 650 mg  650 mg Oral Q4H PRN Chhaya Clemons MD   650 mg at 03/01/23 1304   • bisacodyl (DULCOLAX) EC tablet 5 mg  5 mg Oral Daily PRN Damian Dahl MD       • diphenhydrAMINE (BENADRYL) capsule 25 mg  25 mg Oral BID PRN Damian Dahl MD       • docusate sodium-sennosides (SENOKOT S) 50-8.6 MG 1 tablet  1 tablet Oral BID PRN Damian Dahl MD       • lidocaine (XYLOCAINE) 5 % ointment   Topical PRN Damian Dahl MD       • ALPRAZolam (XANAX) tablet 0.25 mg  0.25 mg Oral Q8H PRN Damian Dahl MD       • sodium chloride (NORMAL SALINE) 0.9 % bolus 100-200 mL  100-200 mL Intravenous PRN Summer Espinoza MD            HOME MEDICATIONS:    Prior to Admission medications    Medication Sig Start Date End Date Taking? Authorizing Provider   vancomycin (VANCOCIN) 125 MG capsule Take 1 capsule by mouth 4 times daily for 18 days. 3/3/23 3/21/23 Yes Destiny Nolasco PA-C   DAPTOmycin (CUBICIN) 500 MG injection Inject 340 mg into the vein 3 days a week for 11 days. 3/3/23 3/14/23 Yes Destiny Nolasco PA-C   acetaminophen-codeine (TYLENOL NO.3) 300-30 MG per tablet Take 1 tablet by mouth every 6 hours as needed for Pain. 8/26/22  Yes Damian Dahl MD   atorvastatin (LIPITOR) 10 MG tablet Take 10 mg by mouth daily.   Yes Outside  dizziness  Discharge Plan: Anticipate discharge tomorrow to home  Code Status: Level 1 - Full Code    Subjective:   Patient seen examined at bedside  Overall patient is feeling better states the nausea has resolved  Patient had episode of dizziness with difficulty ambulating to her bed after straining to go to the bathroom  Most likely vasovagal     Objective:     Vitals:   Temp (24hrs), Av 4 °F (36 3 °C), Min:93 8 °F (34 3 °C), Max:98 8 °F (37 1 °C)    Temp:  [93 8 °F (34 3 °C)-98 8 °F (37 1 °C)] 97 8 °F (36 6 °C)  HR:  [75-93] 90  Resp:  [12-18] 18  BP: (128-144)/(49-67) 144/67  SpO2:  [94 %-100 %] 95 %  Body mass index is 29 29 kg/m²  Input and Output Summary (last 24 hours): Intake/Output Summary (Last 24 hours) at 11/3/2022 1653  Last data filed at 11/3/2022 1401  Gross per 24 hour   Intake --   Output 1525 ml   Net -1525 ml       Physical Exam:   Physical Exam  Vitals reviewed  HENT:      Head: Normocephalic and atraumatic  Right Ear: External ear normal       Left Ear: External ear normal       Nose: Nose normal       Mouth/Throat:      Mouth: Mucous membranes are moist       Pharynx: Oropharynx is clear  Eyes:      Extraocular Movements: Extraocular movements intact  Cardiovascular:      Rate and Rhythm: Normal rate and regular rhythm  Heart sounds: Murmur heard  Pulmonary:      Effort: Pulmonary effort is normal       Breath sounds: Normal breath sounds  Abdominal:      General: Abdomen is flat  Palpations: Abdomen is soft  Tenderness: There is no abdominal tenderness  Musculoskeletal:      Cervical back: Normal range of motion  Right lower leg: Edema present  Left lower leg: Edema present  Skin:     General: Skin is warm and dry  Neurological:      Mental Status: She is alert  Mental status is at baseline  She is disoriented     Psychiatric:         Mood and Affect: Mood normal          Behavior: Behavior normal           Additional Data: Labs:  Results from last 7 days   Lab Units 11/03/22  0507   WBC Thousand/uL 5 32   HEMOGLOBIN g/dL 10 9*   HEMATOCRIT % 32 0*   PLATELETS Thousands/uL 200   NEUTROS PCT % 87*   LYMPHS PCT % 11*   MONOS PCT % 2*   EOS PCT % 0     Results from last 7 days   Lab Units 11/03/22  0507   SODIUM mmol/L 136   POTASSIUM mmol/L 3 9   CHLORIDE mmol/L 106   CO2 mmol/L 21   BUN mg/dL 16   CREATININE mg/dL 0 75   ANION GAP mmol/L 9   CALCIUM mg/dL 9 2   ALBUMIN g/dL 3 6   TOTAL BILIRUBIN mg/dL 0 48   ALK PHOS U/L 53   ALT U/L 14   AST U/L 18   GLUCOSE RANDOM mg/dL 101                 Results from last 7 days   Lab Units 11/02/22 2104 11/02/22 2020   LACTIC ACID mmol/L 0 8  --    PROCALCITONIN ng/ml  --  <0 05       Lines/Drains:  Invasive Devices  Report    Peripheral Intravenous Line  Duration           Peripheral IV 11/02/22 Right Antecubital 1 day    Peripheral IV 11/02/22 Left Arm <1 day                      Imaging: Reviewed radiology reports from this admission including: abdominal/pelvic CT    Recent Cultures (last 7 days):   Results from last 7 days   Lab Units 11/02/22 2104   BLOOD CULTURE  Received in Microbiology Lab  Culture in Progress  Received in Microbiology Lab  Culture in Progress         Last 24 Hours Medication List:   Current Facility-Administered Medications   Medication Dose Route Frequency Provider Last Rate   • acetaminophen  650 mg Oral Q6H PRN Mari Summers PA-C     • amLODIPine  5 mg Oral HS Lizziei Toheathere, DO     • aspirin  81 mg Oral Daily Mari Summers PA-C     • [START ON 11/4/2022] carvedilol  12 5 mg Oral BID With Meals Darshan Toheathere, DO     • ezetimibe  10 mg Oral Daily Mari Summers PA-C     • heparin (porcine)  5,000 Units Subcutaneous Q8H [address] Mari Summers PA-C     • levothyroxine  50 mcg Oral Early Morning Mari Summers PA-C     • [START ON 11/4/2022] lisinopril  40 mg Oral HS Pandi Todhe, DO     • pantoprazole  40 mg Oral Early Provider   folic acid (FOLATE) 1 MG tablet Take 1 tablet by mouth daily. 12/20/21  Yes Outside Provider   levothyroxine 50 MCG tablet Take 50 mcg by mouth daily.    Yes Outside Provider   pantoprazole (PROTONIX) 40 MG tablet Take 40 mg by mouth every morning.   Yes Outside Provider   cholecalciferol (VITAMIN D) 25 mcg (1,000 units) tablet Take 25 mcg by mouth daily.   Yes Outside Provider   diphenhydrAMINE (BENADRYL) 25 MG capsule Take 1 capsule by mouth 2 times daily as needed for Itching. 8/30/22   Damian Dahl MD   nystatin (MYCOSTATIN) 939832 UNIT/GM powder Apply 1 application topically every 12 hours. 8/30/22   Damian Dahl MD   ALPRAZolam (XANAX) 0.25 MG tablet Take 1 tablet by mouth every 8 hours as needed for Anxiety.  Patient not taking: Reported on 2/27/2023 8/26/22   Damian Dahl MD   epoetin sp-epbx (RETACRIT) 70094 UNIT/ML injection Inject 1 mL into the skin 3 days a week. Do not start before August 29, 2022. 8/29/22   Damian Dahl MD   lidocaine (XYLOCAINE) 5 % ointment Apply topically as needed for Pain. 8/26/22   Damian Dahl MD   bisacodyl (DULCOLAX) 5 MG EC tablet Take 1 tablet by mouth daily as needed for Constipation.  Patient not taking: Reported on 2/27/2023 4/12/22   Yue Ruiz MD   carvedilol (COREG) 3.125 MG tablet Take 3.125 mg by mouth daily. Prescribed bid but patient takes once daily  Patient not taking: Reported on 2/27/2023 2/19/22   Outside Provider   insulin glargine 100 UNIT/ML pen-injector Inject 13 Units into the skin daily. Lantus, takes if needed based on glucose    Outside Provider   docusate sodium-sennosides (SENOKOT S) 50-8.6 MG per tablet Take 1 tablet by mouth 2 times daily as needed.    Outside Provider   midodrine (PROAMATINE) 2.5 MG tablet Take 2.5 mg by mouth 3 days a week. WITH DIALYSIS  Patient not taking: Reported on 2/27/2023 3/29/22   Outside Provider   linaGLIPtin (TRADJENTA) 5 MG tablet Take 1 tablet by mouth daily. 3/2/22 8/26/22   Morning Mari Manning PA-C     • pravastatin  40 mg Oral HS Mari Manning PA-C     • trimethobenzamide  200 mg Intramuscular Q6H PRN Mari Manning PA-C          Today, Patient Was Seen By: Nicolasa Bell    **Please Note: This note may have been constructed using a voice recognition system  ** Outside Provider        ALLERGIES:    ALLERGIES:   Allergen Reactions   • Levaquin PRURITUS   • Levofloxacin RASH   • Penicillin G Other (See Comments)     Thrush. Tolerates amoxicillin, and amox/clav  Thrush. Tolerates amoxicillin     • Vancomycin THROAT SWELLING     Has tolerated PO vancomycin   • Cephalexin Other (See Comments) and RASH     Tolerated Ceftazadime  Gives her a sensitive sensation in her mouth          PHYSICAL EXAM    Visit Vitals  /61   Pulse 61   Temp 97.7 °F (36.5 °C) (Oral)   Resp 16   Ht 5' 3\" (1.6 m)   Wt 63.6 kg (140 lb 3.4 oz)   SpO2 96%   BMI 24.84 kg/m²       General: In no acute distress  Cognition and Speech: Normal speech, oriented x3  Eyes:  Pupils are equal, round and reactive to light, Extraocular movements are intact. Visual fields are full    HEENT:  Normocephalic, Normal hearing both ears, throat clear  Neck:  Supple, Non-tender,   Patient alert, oriented to person place and time.  Speech is clear.    Patient is able to repeat and comprehend well.  Cranial nerve exam: Grossly CN are intact 2-12. facial strength and sensation intact, uvula midline, palatal evaluation normal, tongue movements were normal.  Normal shoulder shrug strength.  Motor: Able to move all extremities with good strength, status post left BKA and right toe amputation   Otherwise proximal strength is normal in lower extremities  No pronator drift  Gait deferred    LABORATORY    I have reviewed the pertinent laboratory tests:    Recent Results (from the past 24 hour(s))   CBC No Differential    Collection Time: 03/03/23  3:37 PM   Result Value Ref Range    WBC 4.5 4.2 - 11.0 K/mcL    RBC 3.01 (L) 4.00 - 5.20 mil/mcL    HGB 8.6 (L) 12.0 - 15.5 g/dL    HCT 29.0 (L) 36.0 - 46.5 %    MCV 96.3 78.0 - 100.0 fl    MCH 28.6 26.0 - 34.0 pg    MCHC 29.7 (L) 32.0 - 36.5 g/dL     140 - 450 K/mcL    RDW-CV 17.3 (H) 11.0 - 15.0 %    RDW-SD 61.7 (H) 39.0 - 50.0 fL    NRBC 0 <=0 /100 WBC   GLUCOSE, BEDSIDE - POINT  OF CARE    Collection Time: 03/03/23  4:52 PM   Result Value Ref Range    GLUCOSE, BEDSIDE - POINT OF CARE 148 (H) 70 - 99 mg/dL   GLUCOSE, BEDSIDE - POINT OF CARE    Collection Time: 03/03/23  7:40 PM   Result Value Ref Range    GLUCOSE, BEDSIDE - POINT OF CARE 137 (H) 70 - 99 mg/dL   CBC with Automated Differential (performable only)    Collection Time: 03/04/23  4:25 AM   Result Value Ref Range    WBC 4.9 4.2 - 11.0 K/mcL    RBC 3.01 (L) 4.00 - 5.20 mil/mcL    HGB 8.6 (L) 12.0 - 15.5 g/dL    HCT 28.1 (L) 36.0 - 46.5 %    MCV 93.4 78.0 - 100.0 fl    MCH 28.6 26.0 - 34.0 pg    MCHC 30.6 (L) 32.0 - 36.5 g/dL    RDW-CV 17.5 (H) 11.0 - 15.0 %    RDW-SD 59.9 (H) 39.0 - 50.0 fL     140 - 450 K/mcL    NRBC 0 <=0 /100 WBC    Neutrophil, Percent 45 %    Lymphocytes, Percent 32 %    Mono, Percent 9 %    Eosinophils, Percent 13 %    Basophils, Percent 1 %    Immature Granulocytes 0 %    Absolute Neutrophils 2.2 1.8 - 7.7 K/mcL    Absolute Lymphocytes 1.6 1.0 - 4.0 K/mcL    Absolute Monocytes 0.4 0.3 - 0.9 K/mcL    Absolute Eosinophils  0.6 (H) 0.0 - 0.5 K/mcL    Absolute Basophils 0.0 0.0 - 0.3 K/mcL    Absolute Immature Granulocytes 0.0 0.0 - 0.2 K/mcL   GLUCOSE, BEDSIDE - POINT OF CARE    Collection Time: 03/04/23  7:33 AM   Result Value Ref Range    GLUCOSE, BEDSIDE - POINT OF CARE 56 (L) 70 - 99 mg/dL   GLUCOSE, BEDSIDE - POINT OF CARE    Collection Time: 03/04/23  7:58 AM   Result Value Ref Range    GLUCOSE, BEDSIDE - POINT OF CARE 160 (H) 70 - 99 mg/dL       IMAGING    I have reviewed the pertinent imaging/study reports.      US KIDNEY RIGHT   Final Result   Normal sonographic appearance of the right transplant kidney.  Minimal   prominence of the renal collecting system without beverley hydronephrosis,   improved compared to prior CT.      Electronically Signed by: PIA CORRALES MD    Signed on: 3/3/2023 3:44 PM          IR DIALYSIS CIRCUIT ANGIOGRAM   Final Result      CT ABDOMEN PELVIS W CONTRAST - IV  contrast only   Final Result   A transplanted kidney with hydronephrosis noted in the right lower   quadrant. Right perinephric stranding seen.  Urinary bladder is   decompressed.  Wall thickening of the distal right ureter.  Possibility of   right hydronephrosis or superimposed infection/urosepsis cannot be   excluded.  Further evaluation is advised.  Correlation with the   transplanted renal ultrasound is also recommended.        tiny bilateral pleural effusions.  Left ventricular myocardial hypertrophy.    Trace pericardial fluid.      Tiny focus of low density in the medial aspect of the spleen is too small   to characterize.      Electronically Signed by: CARLOS CHRISTIANSEN MD    Signed on: 2023 11:49 AM          XR CHEST AP OR PA   Final Result   Impression:    No acute cardiopulmonary abnormality.      Electronically Signed by: WONG GERARD MD    Signed on: 2023 11:17 AM          US PELVIS NON-OB EXTERNALTRANSABDOMINAL    (Results Pending)      Results for orders placed during the hospital encounter of 23    TRANSTHORACIC ECHO (TTE) COMPLETE W/ W/O IMAGING AGENT    Narrative  *[facility]*  [address] [place] [address]  Transthoracic Echocardiogram (TTE)    Patient: Diana Dejesus     Study Date/Time:        2023 5:24PM  MRN:     [id number]             FIN#:                   [id number]  :     1949          Ht/Wt:                  160cm 64kg  Age:     73                  BSA/BMI:                1.7m^2 25kg/m^2  Gender:  F                   Baseline BP:            119 / 61  Ordering Physician:    Chhaya Clemons    Referring Physician:   Chhaya Clemons    Attending Physician:   Damian Dahl  Performing Physician:  AMG  Diagnostic Physician:  Monica Bender D.O  Sonographer:           TABITHA Hernandez    ------------------------------------------------------------------------------  INDICATIONS:    Bacteremia.    ------------------------------------------------------------------------------  STUDY CONCLUSIONS  SUMMARY:    1. Left ventricle: The cavity size is normal. Wall thickness is mildly to  moderately increased. There is concentric hypertrophy. Systolic function is  normal. The ejection fraction was measured by biplane method of disks. The  ejection fraction is 68%.  2. Aortic valve: The annulus is mildly calcified. The valve is trileaflet. The  leaflets are normal thickness and mildly sclerotic.  3. Mitral valve: Severe mitral annular calcification extending into the  posterior leaflet with restriction in motion. The mean diastolic gradient  is 5mm Hg.  4. Left atrium: The atrium is moderately dilated.  5. Right ventricle: The cavity size is dilated. Wall thickness is normal.  Systolic function is normal. Systolic pressure is moderately increased. The  estimated peak pressure is 54mm Hg.  6. Right atrium: The atrium is moderately dilated.  7. Tricuspid valve: There is moderate-severe regurgitation.  8. Sensitivity and specificity is diminished for vegetation, may need to  consider transesophageal echocardiogram if clinically indicated.    ------------------------------------------------------------------------------  STUDY DATA:   Procedure:  A transthoracic echocardiogram was performed. Image  quality was good.  M-mode, complete 2D, complete spectral Doppler, and color  Doppler.  Study status:  Routine.  Study completion:  There were no  complications.    FINDINGS    LEFT VENTRICLE:  The cavity size is normal. Wall thickness is mildly to  moderately increased. There is concentric hypertrophy. Systolic function is  normal. Wall motion is normal; there are no regional wall motion  abnormalities. Unable to accurately assess left ventricular diastolic function  parameters due to technical limitations.    The ejection fraction was measured  by biplane method of disks. The ejection fraction is 68%. Cannot  assess LV  diastolic function.    AORTIC VALVE:  The annulus is mildly calcified. The valve is trileaflet. The  leaflets are normal thickness and mildly sclerotic. Cusp separation is normal.  Velocity is within the normal range. There is no stenosis. There is trivial  regurgitation.    MITRAL VALVE:  The annulus is severely calcified. The leaflets are moderately  thickened. Leaflet separation is reduced. Leaflet mobility is restricted.  Severe mitral annular calcification extending into the posterior leaflet with  some restriction in motion.  The findings are consistent with mild to moderate  stenosis.   There is mild regurgitation. The mean diastolic gradient is 5mm  Hg. The peak diastolic gradient is 14mm Hg.    LEFT ATRIUM:  The atrium is moderately dilated.    RIGHT VENTRICLE:  The cavity size is dilated. Wall thickness is normal.  Systolic function is normal. The TAPSE is reduced, suggestive of RV systolic  dysfunction.  Systolic pressure is moderately increased. The estimated peak  pressure is 54mm Hg.       The RV pressure during systole is 36mm Hg.    PULMONIC VALVE:   Not well visualized. There is no significant regurgitation.    TRICUSPID VALVE:  The valve is structurally normal. Leaflet separation is  normal. Inflow velocity is within the normal range. There is no evidence for  stenosis. There is moderate-severe regurgitation.    RIGHT ATRIUM:  The atrium is moderately dilated.       The estimated central  venous pressure is 15mm Hg.    PERICARDIUM:  A trivial pericardial effusion is identified.    SYSTEMIC VEINS:  Inferior vena cava: The IVC is dilated.  Respirophasic diameter changes are  blunted (< 50%).    ------------------------------------------------------------------------------  Measurements    Left ventricle         Value        Ref       08/17/2022  Left atrium              Value          Ref       08/17/2022  SHAN, LAX chord     (N) 4.3   cm     3.8 - 5.2 4.3         AP dim, ES           (H)  4.0   cm       2.7 - 3.8 4.4  ESD, LAX chord     (N) 3.3   cm     2.2 - 3.5 3.2         AP dim index, ES     (H) 2.4   cm/m^2   1.5 - 2.3 ----------  SHAN/bsa, LAX chord (N) 2.5   cm/m^2 2.3 - 3.1 2.3         Area ES, A4C         (H) 22    cm^2     <=20      ----------  ESD/bsa, LAX chord (N) 2.0   cm/m^2 1.3 - 2.1 1.7         Area/bsa ES, A4C         12.87 cm^2/m^2 --------- ----------  PW, ED, LAX        (H) 1.1   cm     0.6 - 0.9 1.1         Vol, S               (H) 85    ml       22 - 52   ----------  IVS, ED            (H) 1.7   cm     0.6 - 0.9 1.1         Vol/bsa, S           (H) 50    ml/m^2   16 - 34   ----------  PW, ED             (H) 1.1   cm     0.6 - 0.9 1.1         Vol, ES, 1-p A4C     (H) 64    ml       22 - 52   ----------  IVS/PW, ED             1.48         --------- 1.03        Vol/bsa, ES, 1-p A4C (N) 38    ml/m^2   11 - 40   ----------  EDV                (N) 83    ml     46 - 106  82          Vol, ES, 1-p A2C     (H) 94    ml       22 - 52   ----------  ESV                (H) 45    ml     14 - 42   40          Vol/bsa, ES, 1-p A2C (H) 55    ml/m^2   13 - 40   ----------  EF                 (N) 68    %      54 - 74   60  SV                     38    ml     --------- 42          Mitral valve             Value          Ref       08/17/2022  EDV/bsa            (N) 49    ml/m^2 29 - 61   43          Mean grad, D             5     mm Hg    --------- ----------  ESV/bsa            (H) 27    ml/m^2 8 - 24    21          Peak grad, D             14    mm Hg    --------- 5  SV/bsa                 23    ml/m^2 --------- 22          MR peak v                3.53  m/sec    --------- ----------  ESV, 1-p A4C       (N) 22    ml     12 - 60   11          Peak LV-LA grad S        50    mm Hg    --------- ----------  SV, 1-p A4C            29    ml     --------- 27  ESV/bsa, 1-p A4C   (N) 13    ml/m^2 7 - 35    6           Tricuspid valve          Value          Ref       08/17/2022  SV/bsa, 1-p A4C        17     ml/m^2 --------- 14          TR peak v            (N) 2.3   m/sec    <=2.8     2.9  EDV, 2-p           (N) 60    ml     46 - 106  38          Peak RV-RA grad, S       21    mm Hg    --------- 33  ESV, 2-p           (N) 19    ml     14 - 42   ----------  EDV/bsa, 2-p       (N) 35    ml/m^2 29 - 61   20          Aortic root              Value          Ref       08/17/2022  ESV/bsa, 2-p       (N) 11    ml/m^2 8 - 24    ----------  Root diam, ED        (N) 3.1   cm       2.4 - 3.9 ----------    LVOT                   Value        Ref       08/17/2022  Pulmonary artery         Value          Ref       08/17/2022  Peak galilea, S            1.23  m/sec  --------- ----------  Pressure, S              36    mm Hg    --------- 41  Peak grad, S           6     mm Hg  --------- ----------  Systemic veins           Value          Ref       08/17/2022  Right ventricle        Value        Ref       08/17/2022  Estimated CVP            15    mm Hg    --------- 8  SHAN, LAX               2.4   cm     --------- 3.4  TAPSE, MM          (L) 1.6   cm     >=1.7     ----------  Pressure, S            36    mm Hg  --------- 41  Legend:  (L)  and  (H)  tessy values outside specified reference range.    (N)  marks values inside specified reference range.    Prepared and electronically signed by  Monica Bender D.O  02/28/2023 19:35       IMPRESSION :    72-year-old female with multiple medical issues, last night with some transient change in mental status could have been secondary to sundowning/mild psychosis, in the midst of metabolic encephalopathy, mild anemia as well, recent UTI and now with vaginal bleed.  No focal motor deficit reported  This morning patient essentially back to normal with good orientation and no focal neurodeficit  Less likely new stroke or seizures    RECOMMENDATIONS :    1. In my opinion further neuroimaging or testing may not be necessary  2. Can consider MRI brain if patient develops recurrent mental status  deterioration or focal weakness  3. Fall precaution, DVT prophylaxis  4. Agree with current work-up and management in progress    We will follow peripherally if needed, please call for questions  Discussed with patient and staff in detail    Thank you    Herbert Thompson MD   (available via [employer])  [facility] Neurology Attending

## 2023-03-08 ENCOUNTER — HOSPITAL ENCOUNTER (OUTPATIENT)
Dept: NON INVASIVE DIAGNOSTICS | Facility: CLINIC | Age: 88
Discharge: HOME | End: 2023-03-08

## 2023-03-08 DIAGNOSIS — I65.23 CAROTID STENOSIS, ASYMPTOMATIC, BILATERAL: ICD-10-CM

## 2023-03-17 DIAGNOSIS — E03.9 ACQUIRED HYPOTHYROIDISM: ICD-10-CM

## 2023-03-17 RX ORDER — LEVOTHYROXINE SODIUM 0.05 MG/1
TABLET ORAL
Qty: 30 TABLET | Refills: 2 | Status: SHIPPED | OUTPATIENT
Start: 2023-03-17

## 2023-04-01 ENCOUNTER — APPOINTMENT (OUTPATIENT)
Dept: LAB | Facility: CLINIC | Age: 88
End: 2023-04-01

## 2023-04-01 DIAGNOSIS — E78.2 MIXED HYPERLIPIDEMIA: ICD-10-CM

## 2023-04-01 DIAGNOSIS — I10 ESSENTIAL HYPERTENSION: ICD-10-CM

## 2023-04-01 DIAGNOSIS — E03.9 ACQUIRED HYPOTHYROIDISM: ICD-10-CM

## 2023-04-01 DIAGNOSIS — Z79.899 HIGH RISK MEDICATION USE: ICD-10-CM

## 2023-04-01 DIAGNOSIS — N18.31 STAGE 3A CHRONIC KIDNEY DISEASE (HCC): ICD-10-CM

## 2023-04-01 LAB
ALBUMIN SERPL BCP-MCNC: 4 G/DL (ref 3.5–5)
ALP SERPL-CCNC: 64 U/L (ref 34–104)
ALT SERPL W P-5'-P-CCNC: 13 U/L (ref 7–52)
ANION GAP SERPL CALCULATED.3IONS-SCNC: 7 MMOL/L (ref 4–13)
AST SERPL W P-5'-P-CCNC: 17 U/L (ref 13–39)
BILIRUB SERPL-MCNC: 0.57 MG/DL (ref 0.2–1)
BUN SERPL-MCNC: 17 MG/DL (ref 5–25)
CALCIUM SERPL-MCNC: 9.6 MG/DL (ref 8.4–10.2)
CHLORIDE SERPL-SCNC: 108 MMOL/L (ref 96–108)
CHOLEST SERPL-MCNC: 143 MG/DL
CO2 SERPL-SCNC: 24 MMOL/L (ref 21–32)
CREAT SERPL-MCNC: 0.95 MG/DL (ref 0.6–1.3)
GFR SERPL CREATININE-BSD FRML MDRD: 53 ML/MIN/1.73SQ M
GLUCOSE P FAST SERPL-MCNC: 99 MG/DL (ref 65–99)
HDLC SERPL-MCNC: 60 MG/DL
LDLC SERPL CALC-MCNC: 67 MG/DL (ref 0–100)
NONHDLC SERPL-MCNC: 83 MG/DL
POTASSIUM SERPL-SCNC: 4.3 MMOL/L (ref 3.5–5.3)
PROT SERPL-MCNC: 6.9 G/DL (ref 6.4–8.4)
SODIUM SERPL-SCNC: 139 MMOL/L (ref 135–147)
TRIGL SERPL-MCNC: 78 MG/DL
TSH SERPL DL<=0.05 MIU/L-ACNC: 1.93 UIU/ML (ref 0.45–4.5)

## 2023-04-24 ENCOUNTER — OFFICE VISIT (OUTPATIENT)
Dept: INTERNAL MEDICINE CLINIC | Facility: CLINIC | Age: 88
End: 2023-04-24

## 2023-04-24 VITALS
RESPIRATION RATE: 16 BRPM | DIASTOLIC BLOOD PRESSURE: 70 MMHG | TEMPERATURE: 97.8 F | HEART RATE: 77 BPM | OXYGEN SATURATION: 96 % | SYSTOLIC BLOOD PRESSURE: 134 MMHG | BODY MASS INDEX: 29.88 KG/M2 | WEIGHT: 162.4 LBS | HEIGHT: 62 IN

## 2023-04-24 DIAGNOSIS — I65.23 CAROTID STENOSIS, ASYMPTOMATIC, BILATERAL: ICD-10-CM

## 2023-04-24 DIAGNOSIS — I10 ESSENTIAL HYPERTENSION: ICD-10-CM

## 2023-04-24 DIAGNOSIS — N18.31 STAGE 3A CHRONIC KIDNEY DISEASE (HCC): ICD-10-CM

## 2023-04-24 DIAGNOSIS — K59.09 OTHER CONSTIPATION: ICD-10-CM

## 2023-04-24 DIAGNOSIS — I35.0 AORTIC VALVE STENOSIS, ETIOLOGY OF CARDIAC VALVE DISEASE UNSPECIFIED: ICD-10-CM

## 2023-04-24 DIAGNOSIS — M54.2 NECK PAIN: ICD-10-CM

## 2023-04-24 DIAGNOSIS — K21.9 GASTROESOPHAGEAL REFLUX DISEASE WITHOUT ESOPHAGITIS: ICD-10-CM

## 2023-04-24 DIAGNOSIS — E03.9 ACQUIRED HYPOTHYROIDISM: Primary | ICD-10-CM

## 2023-04-24 DIAGNOSIS — E78.2 MIXED HYPERLIPIDEMIA: ICD-10-CM

## 2023-04-24 NOTE — ASSESSMENT & PLAN NOTE
Sometimes he gets pain in the neck area  Denies any fall or injury  Denies any radiation in the arms or denies tingling numbness in the arms  Most likely osteoarthritis  She sometimes uses a heating pad  Advised to try acetaminophen as needed for pain  Advised to avoid NSAIDs

## 2023-04-24 NOTE — PATIENT INSTRUCTIONS
Patient was advised to continue present medications  discussed with the patient medications and laboratory data in detail  Follow-up with me in 3 months or as advised  If any blood test was ordered please do 1 week prior to next appointment unless advise to get earlier    If you have any questions please call the office [phone]

## 2023-04-24 NOTE — ASSESSMENT & PLAN NOTE
Her symptoms are controlled on famotidine which she takes as needed  Advised to continue with diet and reflux precautions

## 2023-04-24 NOTE — ASSESSMENT & PLAN NOTE
Lab Results   Component Value Date    EGFR 53 04/01/2023    EGFR 55 11/12/2022    EGFR 70 11/03/2022    CREATININE 0 95 04/01/2023    CREATININE 0 92 11/12/2022    CREATININE 0 75 11/03/2022   Her renal function stable  Advised to maintain hydration  Continue present medications  We will follow renal function

## 2023-04-24 NOTE — ASSESSMENT & PLAN NOTE
Well-controlled  Cholesterol 143, triglycerides 78, HDL 60, LDL 67 so advised to continue present dose of pravastatin and Zetia  Advised to continue low-cholesterol low-carb diet

## 2023-04-24 NOTE — ASSESSMENT & PLAN NOTE
She asymptomatic presently  She has been seen and followed by vascular surgery  she had a Doppler test which did not reveal any significant changes

## 2023-04-24 NOTE — PROGRESS NOTES
Assessment/Plan:    1  Acquired hypothyroidism  Assessment & Plan:  Her TSH is therapeutic so advised to continue same dose of levothyroxine  2  Gastroesophageal reflux disease without esophagitis  Assessment & Plan:  Her symptoms are controlled on famotidine which she takes as needed  Advised to continue with diet and reflux precautions  3  Mixed hyperlipidemia  Assessment & Plan:  Well-controlled  Cholesterol 143, triglycerides 78, HDL 60, LDL 67 so advised to continue present dose of pravastatin and Zetia  Advised to continue low-cholesterol low-carb diet  4  Essential hypertension  Assessment & Plan:  Blood pressure well controlled  Advised to continue present medication  Advised for low-salt diet  5  Carotid stenosis, asymptomatic, bilateral  Assessment & Plan:  She asymptomatic presently  She has been seen and followed by vascular surgery  she had a Doppler test which did not reveal any significant changes  6  Stage 3a chronic kidney disease [facility])  Assessment & Plan:  Lab Results   Component Value Date    EGFR 53 04/01/2023    EGFR 55 11/12/2022    EGFR 70 11/03/2022    CREATININE 0 95 04/01/2023    CREATININE 0 92 11/12/2022    CREATININE 0 75 11/03/2022   Her renal function stable  Advised to maintain hydration  Continue present medications  We will follow renal function  7  Other constipation  Assessment & Plan:  States he stopped taking a stool softener  No sick takes prune juice which is effective  Advised to maintain hydration  8  BMI 29 0-29 9,adult    9  Aortic valve stenosis, etiology of cardiac valve disease unspecified  Assessment & Plan:  Presently stable  She has been seen and followed by her cardiologist Dr Tee Rodriguez  10  Neck pain  Assessment & Plan:  Sometimes he gets pain in the neck area  Denies any fall or injury  Denies any radiation in the arms or denies tingling numbness in the arms  Most likely osteoarthritis    She sometimes uses a heating pad  Advised to try acetaminophen as needed for pain  Advised to avoid NSAIDs  BMI Counseling: Body mass index is 29 7 kg/m²  The BMI is above normal  Nutrition recommendations include decreasing portion sizes, decreasing fast food intake, consuming healthier snacks, limiting drinks that contain sugar, moderation in carbohydrate intake, reducing intake of saturated and trans fat and reducing intake of cholesterol  Exercise recommendations include exercising 3-5 times per week  No pharmacotherapy was ordered  Rationale for BMI follow-up plan is due to patient being overweight or obese  Depression Screening and Follow-up Plan: Patient was screened for depression during today's encounter  They screened negative with a PHQ-2 score of 0  Subjective: Presents for follow-up  Patient ID: Medina Mendez is a 80 y o  female  HPI   66-year-old white female patient presents to follow-up her medical problems  She denies any chest pain  Occasionally she gets some shortness of breath mainly when she goes up hills or up stairs but overall her breathing is okay  She denies any cough, fever, chills  Denies nausea vomiting diarrhea  Constipation is better after she has been using prune juice  She has been followed by cardiologist as well as vascular surgery  She has been getting her carotid artery Doppler every 6 months    The following portions of the patient's history were reviewed and updated as appropriate:     Past Medical History:  She has a past medical history of Allergic rhinitis, Aortic stenosis, moderate, Bleeding per rectum, Blood glucose elevated, BMI 26 0-26 9,adult (11/8/2022), BMI 29 0-29 9,adult (10/19/2022), BMI 30 0-30 9,adult (10/14/2021), BMI 32 0-32 9,adult (2/24/2021), Carotid arterial disease ([facility] [address] ), Colonoscopy refused, Conjunctivitis, allergic, Constipation, Disease of thyroid gland, Elbow fracture, Encounter for support and coordination of transition of care (11/8/2022), Excessive cerumen in right ear canal (11/8/2022), GERD (gastroesophageal reflux disease), Herpes zoster, Hyperlipidemia, Hypertension, Medicare annual wellness visit, subsequent (10/14/2021), Medicare annual wellness visit, subsequent (10/19/2022), Neck pain (4/24/2023), Osteoarthritis of multiple joints (1/14/2022), Other constipation (11/8/2022), Other specified anemias (11/8/2022), Pacemaker, Post herpetic neuralgia, Skin rash (5/26/2021), Stage 3a chronic kidney disease ([facility] [address] ) (1/14/2022), and Vomiting (11/8/2022)  ,  _______________________________________________________________________  Past Surgical History:   has a past surgical history that includes Joint replacement; Tubal ligation; Cardiac pacemaker placement; and Total hip arthroplasty (Left, 02/2015)  ,  _______________________________________________________________________  Family History:  family history includes Cancer in her father; Heart disease in her mother ,  _______________________________________________________________________  Social History:   reports that she has never smoked  She has never used smokeless tobacco  She reports that she does not drink alcohol and does not use drugs  ,  _______________________________________________________________________  Allergies:  is allergic to ampicillin, codeine, and penicillins     _______________________________________________________________________  Current Outpatient Medications   Medication Sig Dispense Refill   • amLODIPine (NORVASC) 5 mg tablet Take 1 tablet (5 mg total) by mouth daily at bedtime  0   • aspirin 81 mg chewable tablet Chew 81 mg daily     • carvedilol (COREG) 12 5 mg tablet Take 1 tablet (12 5 mg total) by mouth 2 (two) times a day with meals 60 tablet 0   • enalapril (VASOTEC) 20 mg tablet Take 20 mg by mouth 2 (two) times a day     • ezetimibe (ZETIA) 10 mg tablet Take 10 mg by mouth daily     • famotidine (PEPCID) 20 mg tablet Take 20 mg by mouth as needed     • "latanoprost (XALATAN) 0 005 % ophthalmic solution INSTILL 1 DROP IN EACH EYE AT BEDTIME     • levothyroxine 50 mcg tablet TAKE 1 TABLET BY MOUTH EVERY DAY 30 tablet 2   • pravastatin (PRAVACHOL) 40 mg tablet Take 40 mg by mouth daily at bedtime Mon,Friday x2, one ever other day x1       No current facility-administered medications for this visit      _______________________________________________________________________  Review of Systems   Constitutional: Negative for chills and fever  HENT: Negative for congestion, ear pain, hearing loss, nosebleeds, sinus pain, sore throat and trouble swallowing  Eyes: Negative for discharge, redness and visual disturbance  Respiratory: Positive for shortness of breath ( Sometime when she goes up hills or up stairs but not always  She can walk flat surface without any shortness of breath )  Negative for cough and chest tightness  Cardiovascular: Negative for chest pain and palpitations  Gastrointestinal: Negative for abdominal pain, blood in stool, constipation, diarrhea, nausea and vomiting  Genitourinary: Negative for dysuria, flank pain, frequency and hematuria  Musculoskeletal: Positive for neck pain  Negative for arthralgias and myalgias  Skin: Negative for color change and rash  Neurological: Negative for dizziness, syncope, facial asymmetry, speech difficulty, weakness and headaches  Hematological: Does not bruise/bleed easily  Psychiatric/Behavioral: Negative for agitation and behavioral problems  Objective:  Vitals:    04/24/23 0938   BP: 134/70   BP Location: Left arm   Patient Position: Sitting   Cuff Size: Adult   Pulse: 77   Resp: 16   Temp: 97 8 °F (36 6 °C)   TempSrc: Temporal   SpO2: 96%   Weight: 73 7 kg (162 lb 6 4 oz)   Height: 5' 2\" (1 575 m)     Body mass index is 29 7 kg/m²  Physical Exam  Vitals and nursing note reviewed  Constitutional:       General: She is not in acute distress  Appearance: Normal appearance   " HENT:      Head: Normocephalic and atraumatic  Right Ear: Ear canal and external ear normal       Left Ear: Ear canal and external ear normal       Nose: Nose normal       Mouth/Throat:      Mouth: Mucous membranes are moist    Eyes:      General: No scleral icterus  Right eye: No discharge  Left eye: No discharge  Extraocular Movements: Extraocular movements intact  Conjunctiva/sclera: Conjunctivae normal    Cardiovascular:      Rate and Rhythm: Normal rate and regular rhythm  Pulses: Normal pulses  Heart sounds: Murmur heard  Pulmonary:      Effort: Pulmonary effort is normal  No respiratory distress  Breath sounds: Normal breath sounds  No wheezing, rhonchi or rales  Abdominal:      General: Bowel sounds are normal  There is no distension  Palpations: Abdomen is soft  Tenderness: There is no abdominal tenderness  Musculoskeletal:         General: Normal range of motion  Cervical back: Normal range of motion and neck supple  Tenderness ( She has some tenderness on the C-spine  Has a good range of motion of neck ) present  No muscular tenderness  Right lower leg: No edema  Left lower leg: No edema  Skin:     General: Skin is warm  Findings: No rash  Neurological:      General: No focal deficit present  Mental Status: She is alert and oriented to person, place, and time  Motor: No weakness  Coordination: Coordination normal    Psychiatric:         Mood and Affect: Mood normal          Behavior: Behavior normal        I spent 30 minutes with the patient today    More than 50% time spent for reviewing of external notes, reviewing of the results of diagnostics test, management of care, patient education and ordering of test

## 2023-05-11 ENCOUNTER — OFFICE VISIT (OUTPATIENT)
Dept: VASCULAR SURGERY | Facility: CLINIC | Age: 88
End: 2023-05-11

## 2023-05-11 VITALS
HEIGHT: 62 IN | HEART RATE: 78 BPM | WEIGHT: 163 LBS | BODY MASS INDEX: 30 KG/M2 | DIASTOLIC BLOOD PRESSURE: 66 MMHG | SYSTOLIC BLOOD PRESSURE: 118 MMHG

## 2023-05-11 DIAGNOSIS — I65.23 CAROTID STENOSIS, ASYMPTOMATIC, BILATERAL: Primary | ICD-10-CM

## 2023-05-11 DIAGNOSIS — E78.2 MIXED HYPERLIPIDEMIA: ICD-10-CM

## 2023-05-11 DIAGNOSIS — I10 ESSENTIAL HYPERTENSION: ICD-10-CM

## 2023-05-11 NOTE — ASSESSMENT & PLAN NOTE
-Adequate blood pressure  -Well-controlled on current regimen
61-year-old female with HTN, HLD, moderate AS, s/p ppm, asymptomatic bilateral carotid stenosis, L>R, CKD 3, GERD, hypothyroid, bilateral hip replacements, arthritis who returns to the office for yearly visit to review CV duplex 3/8/23    -CV duplex that showed right ICA 50 to 69% stenosis and left ICA greater than 70% stenosis with velocities 271/59, ratio 3 70  -Stable in comparison to prior duplex  -Continue aspirin    Continue Setia and pravastatin  -BP adequately controlled  -Recommended continued surveillance of high-grade asymptomatic carotid stenosis  -Repeat carotid duplex in 6 months  -Follow-up in the office in 1 year  -Reviewed the signs symptoms of TIA/CVA and that she call 911 immediately should she experience the symptoms
19-Jun-2018 10:31

## 2023-05-11 NOTE — PATIENT INSTRUCTIONS
Repeat carotid duplex in 6 months  Follow-up in the office in 1 year  Continue your current medications  Carotid Artery Disease   AMBULATORY CARE:   Carotid artery disease (CAD)  means the major blood vessels in your neck are narrowed or becoming blocked  These 2 major blood vessels are called the carotid arteries  They supply your brain with blood  The narrow or blocked blood vessels increase your risk for a stroke  CAD is also called carotid artery stenosis  Have someone call your local emergency number (911 in the [address] [facility]) if:   You have any of the following signs of a stroke:      Numbness or drooping on one side of your face     Weakness in an arm or leg    Confusion or difficulty speaking    Dizziness, a severe headache, or vision loss    You have any of the following signs of a heart attack:      Squeezing, pressure, or pain in your chest    You may  also have any of the following:     Discomfort or pain in your back, neck, jaw, stomach, or arm    Shortness of breath    Nausea or vomiting    Lightheadedness or a sudden cold sweat    Call your doctor if:   You have questions or concerns about your condition or care  Common signs and symptoms:  CAD develops slowly  You may have no signs or symptoms until you have a mini-stroke, or transient ischemic attack (TIA)  A TIA is a temporary lack of blood flow to your brain  A TIA goes away quickly and does not cause permanent damage  A TIA may be a warning sign that you are about to have a stroke  If you have any symptoms of a TIA or stroke, seek care immediately  Warning signs of a stroke:   The words BE FAST can help you remember and recognize warning signs of a stroke:  B = Balance:  Sudden loss of balance    E = Eyes:  Loss of vision in one or both eyes    F = Face:  Face droops on one side    A = Arms:  Arm drops when both arms are raised    S = Speech:  Speech is slurred or sounds different    T = Time:  Time to get help immediately       Treatment depends on how narrow your arteries have become  Treatment also depends on your symptoms and your general health  The goal of treatment is to lower your risk for a stroke  You may need any of the following:  Medicines:      Aspirin,  or other blood thinner, may be recommended  These will help prevent blood clots from forming in your carotid arteries  If your healthcare provider wants you to take aspirin, do not take acetaminophen or ibuprofen instead  Cholesterol medicine  lowers your cholesterol level  Blood pressure medicine  lowers or helps control your blood pressure  Procedures  can help open blocked arteries:     Carotid endarterectomy (CEA)  is used to cut and remove plaque buildup from your arteries  Carotid angioplasty and stenting (ARIEL)  is used to push the plaque against the artery wall with a balloon device  Then, a stent is placed to keep the artery open  A stent is a small metal mesh tube  Prevent a stroke:   Do not smoke, and avoid secondhand smoke  Nicotine and other chemicals in cigarettes and cigars increase your risk for a stroke  Ask your healthcare provider for information if you currently smoke and need help to quit  E-cigarettes or smokeless tobacco still contain nicotine  Talk to your healthcare provider before you use these products  Eat a variety of healthy foods  Healthy foods include fruit, vegetables, whole-grain breads, low-fat dairy products, chicken, and fish  Choose fish that are high in omega-3 fatty acids, such as salmon and fresh tuna  Ask your healthcare provider for more information on a heart healthy diet and the DASH eating plan  Limit sodium (salt)  Sodium may increase your blood pressure  Add less table salt to your foods  Read food labels and choose foods that are low in sodium  Your healthcare provider may suggest you follow a low sodium diet  Reach or maintain a healthy weight  Extra weight makes your heart work harder   Ask your healthcare provider what a healthy weight is for you  He or she can help you create a safe weight loss plan  Even a weight loss of 10% of your extra body weight can help your heart function better  Exercise regularly  Exercise helps improve heart function and can help you manage your weight  Exercise can also help lower your cholesterol and blood sugar levels  Try to get at least 30 minutes of exercise 5 times each week  Try to be physically active every day  This may include walking, riding a bicycle, or swimming  Your healthcare provider can help you create an exercise plan that works best for you  Limit alcohol  Alcohol can increase your blood pressure and triglyceride levels  A drink of alcohol is 12 ounces of beer, 5 ounces of wine, or 1½ ounces of liquor  Follow up with your doctor as directed:  Write down your questions so you remember to ask them during your visits  © Copyright [employer] 2022 Information is for End User's use only and may not be sold, redistributed or otherwise used for commercial purposes  The above information is an  only  It is not intended as medical advice for individual conditions or treatments  Talk to your doctor, nurse or pharmacist before following any medical regimen to see if it is safe and effective for you

## 2023-05-11 NOTE — PROGRESS NOTES
Assessment/Plan:    Carotid stenosis, asymptomatic, bilateral  59-year-old female with HTN, HLD, moderate AS, s/p ppm, asymptomatic bilateral carotid stenosis, L>R, CKD 3, GERD, hypothyroid, bilateral hip replacements, arthritis who returns to the office for yearly visit to review CV duplex 3/8/23    -CV duplex that showed right ICA 50 to 69% stenosis and left ICA greater than 70% stenosis with velocities 271/59, ratio 3 70  -Stable in comparison to prior duplex  -Continue aspirin  Continue Setia and pravastatin  -BP adequately controlled  -Recommended continued surveillance of high-grade asymptomatic carotid stenosis  -Repeat carotid duplex in 6 months  -Follow-up in the office in 1 year  -Reviewed the signs symptoms of TIA/CVA and that she call 911 immediately should she experience the symptoms      Essential hypertension  -Adequate blood pressure  -Well-controlled on current regimen    Mixed hyperlipidemia  -Reviewed recent lipid panel  -Total cholesterol 143, LDL 67, HDL 83, triglycerides 88  -Continue pravastatin and Zetia       Diagnoses and all orders for this visit:    Carotid stenosis, asymptomatic, bilateral  -     VAS carotid complete study; Future    Essential hypertension    Mixed hyperlipidemia          Subjective:      Patient ID: David Raya is a 80 y o  female  Pt presents to rev CV 3/08/23  Pt denies TIA/ CVA symptoms  HPI  59-year-old female with HTN, HLD, moderate AS, s/p ppm, asymptomatic bilateral carotid stenosis, L>R, GERD, hypothyroid, bilateral hip replacements, arthritis who returns to the office for yearly visit to review CV duplex 3/8/23  Patient last seen in the office last year by me  She lives independently  Able to maintain her house chores  She exercises 30 minutes daily on bicycle and alternates with treadmill  She denies any focal neurological events consistent with TIA/CVA  She does have cataracts and follows with ophthalmology every 3 months    She is maintained "on aspirin,  is on Pravachol and Zetia  Pressure is adequately controlled  CV duplex that showed right ICA 50 to 69% stenosis and left ICA greater than 70% stenosis with velocities 271/59, ratio 3 70  Carotid stenosis is overall stable in comparison to prior duplex studies going back to 2017  The following portions of the patient's history were reviewed and updated as appropriate: allergies, current medications, past family history, past medical history, past social history, past surgical history and problem list   Review of systems reviewed    Review of Systems   Constitutional: Negative  HENT: Negative  Eyes: Negative  Respiratory: Negative  Cardiovascular: Negative  Gastrointestinal: Negative  Endocrine: Negative  Genitourinary: Negative  Musculoskeletal: Negative  Skin: Negative  Allergic/Immunologic: Negative  Neurological: Negative  Hematological: Negative  Psychiatric/Behavioral: Negative  Objective:  I have reviewed and made appropriate changes to the review of systems input by the medical assistant      Vitals:    05/11/23 0920   BP: 118/66   BP Location: Left arm   Patient Position: Sitting   Cuff Size: Standard   Pulse: 78   Weight: 73 9 kg (163 lb)   Height: 5' 2\" (1 575 m)       Patient Active Problem List   Diagnosis   • Spiral fracture of shaft of humerus   • SDH (subdural hematoma) (HCC)   • Aortic valve stenosis   • Essential hypertension   • Carotid stenosis, asymptomatic, bilateral   • Gastroesophageal reflux disease without esophagitis   • Acquired hypothyroidism   • Primary osteoarthritis involving multiple joints   • Mixed hyperlipidemia   • History of permanent cardiac pacemaker placement   • Hyperglycemia   • BMI 32 0-32 9,adult   • BMI 31 0-31 9,adult   • Skin rash   • BMI 30 0-30 9,adult   • Stage 3a chronic kidney disease (HCC)   • Osteoarthritis of multiple joints   • BMI 29 0-29 9,adult   • Intractable nausea and vomiting   • Dizziness " • Encounter for support and coordination of transition of care   • Other specified anemias   • Other constipation   • BMI 26 0-26 9,adult   • Vomiting   • Neck pain       Past Surgical History:   Procedure Laterality Date   • CARDIAC PACEMAKER PLACEMENT      ppm   • JOINT REPLACEMENT      both hips   • TOTAL HIP ARTHROPLASTY Left 02/2015   • TUBAL LIGATION         Family History   Problem Relation Age of Onset   • Heart disease Mother    • Cancer Father         colon       Social History     Socioeconomic History   • Marital status:      Spouse name: Not on file   • Number of children: Not on file   • Years of education: Not on file   • Highest education level: Not on file   Occupational History   • Occupation: Retired    Tobacco Use   • Smoking status: Never   • Smokeless tobacco: Never   Vaping Use   • Vaping Use: Never used   Substance and Sexual Activity   • Alcohol use: Never   • Drug use: No   • Sexual activity: Not Currently   Other Topics Concern   • Not on file   Social History Narrative    Lives alone    Mammogram: Advised, pt refused    Colonoscopy: advised, pt refused    Annual eye exam: sees q2y ophth    Bone density study: Advised, pt refused    Annual dental checkup: Sees dentist as needed    - As per [facility]      Social Determinants of Health     Financial Resource Strain: Low Risk    • Difficulty of Paying Living Expenses: Not hard at all   Food Insecurity: No Food Insecurity   • Worried About Running Out of Food in the Last Year: Never true   • Ran Out of Food in the Last Year: Never true   Transportation Needs: No Transportation Needs   • Lack of Transportation (Medical): No   • Lack of Transportation (Non-Medical):  No   Physical Activity: Not on file   Stress: Not on file   Social Connections: Not on file   Intimate Partner Violence: Not on file   Housing Stability: Unknown   • Unable to Pay for Housing in the Last Year: No   • Number of Places Lived in the Last "Year: Not on file   • Unstable Housing in the Last Year: No       Allergies   Allergen Reactions   • Ampicillin    • Codeine GI Intolerance     Light headed   • Penicillins GI Intolerance         Current Outpatient Medications:   •  amLODIPine (NORVASC) 5 mg tablet, Take 1 tablet (5 mg total) by mouth daily at bedtime, Disp: , Rfl: 0  •  aspirin 81 mg chewable tablet, Chew 81 mg daily, Disp: , Rfl:   •  carvedilol (COREG) 12 5 mg tablet, Take 1 tablet (12 5 mg total) by mouth 2 (two) times a day with meals, Disp: 60 tablet, Rfl: 0  •  enalapril (VASOTEC) 20 mg tablet, Take 20 mg by mouth 2 (two) times a day, Disp: , Rfl:   •  ezetimibe (ZETIA) 10 mg tablet, Take 10 mg by mouth daily, Disp: , Rfl:   •  famotidine (PEPCID) 20 mg tablet, Take 20 mg by mouth as needed, Disp: , Rfl:   •  latanoprost (XALATAN) 0 005 % ophthalmic solution, INSTILL 1 DROP IN EACH EYE AT BEDTIME, Disp: , Rfl:   •  levothyroxine 50 mcg tablet, TAKE 1 TABLET BY MOUTH EVERY DAY, Disp: 30 tablet, Rfl: 2  •  pravastatin (PRAVACHOL) 40 mg tablet, Take 40 mg by mouth daily at bedtime Mon,Friday x2, one ever other day x1, Disp: , Rfl:       /66 (BP Location: Left arm, Patient Position: Sitting, Cuff Size: Standard)   Pulse 78   Ht 5' 2\" (1 575 m)   Wt 73 9 kg (163 lb)   BMI 29 81 kg/m²          Physical Exam  Vitals and nursing note reviewed  Constitutional:       Appearance: She is well-developed  HENT:      Head: Normocephalic and atraumatic  Eyes:      Extraocular Movements: Extraocular movements intact  Neck:      Vascular: Carotid bruit present  Cardiovascular:      Pulses:           Radial pulses are 2+ on the right side and 2+ on the left side  Heart sounds: Normal heart sounds  Pulmonary:      Effort: Pulmonary effort is normal       Breath sounds: Normal breath sounds  Abdominal:      General: Bowel sounds are normal       Palpations: Abdomen is soft  Musculoskeletal:         General: Normal range of motion   " Cervical back: Neck supple  Skin:     General: Skin is warm and dry  Comments: Thin/fragile skin of the extremities   Neurological:      General: No focal deficit present  Mental Status: She is alert and oriented to person, place, and time  Psychiatric:         Behavior: Behavior normal          Thought Content:  Thought content normal

## 2023-06-12 DIAGNOSIS — E03.9 ACQUIRED HYPOTHYROIDISM: ICD-10-CM

## 2023-06-12 RX ORDER — LEVOTHYROXINE SODIUM 0.05 MG/1
TABLET ORAL
Qty: 30 TABLET | Refills: 2 | Status: SHIPPED | OUTPATIENT
Start: 2023-06-12

## 2023-07-31 ENCOUNTER — OFFICE VISIT (OUTPATIENT)
Dept: INTERNAL MEDICINE CLINIC | Facility: CLINIC | Age: 88
End: 2023-07-31
Payer: MEDICARE

## 2023-07-31 VITALS
OXYGEN SATURATION: 98 % | TEMPERATURE: 97.9 F | BODY MASS INDEX: 29.81 KG/M2 | WEIGHT: 162 LBS | HEIGHT: 62 IN | HEART RATE: 74 BPM | RESPIRATION RATE: 18 BRPM | DIASTOLIC BLOOD PRESSURE: 84 MMHG | SYSTOLIC BLOOD PRESSURE: 138 MMHG

## 2023-07-31 DIAGNOSIS — N18.31 STAGE 3A CHRONIC KIDNEY DISEASE (HCC): ICD-10-CM

## 2023-07-31 DIAGNOSIS — E03.9 ACQUIRED HYPOTHYROIDISM: Primary | ICD-10-CM

## 2023-07-31 DIAGNOSIS — K21.9 GASTROESOPHAGEAL REFLUX DISEASE WITHOUT ESOPHAGITIS: ICD-10-CM

## 2023-07-31 DIAGNOSIS — E78.2 MIXED HYPERLIPIDEMIA: ICD-10-CM

## 2023-07-31 DIAGNOSIS — I10 ESSENTIAL HYPERTENSION: ICD-10-CM

## 2023-07-31 DIAGNOSIS — I35.0 AORTIC VALVE STENOSIS, ETIOLOGY OF CARDIAC VALVE DISEASE UNSPECIFIED: ICD-10-CM

## 2023-07-31 DIAGNOSIS — Z12.31 ENCOUNTER FOR SCREENING MAMMOGRAM FOR BREAST CANCER: ICD-10-CM

## 2023-07-31 DIAGNOSIS — M15.9 PRIMARY OSTEOARTHRITIS INVOLVING MULTIPLE JOINTS: ICD-10-CM

## 2023-07-31 DIAGNOSIS — I65.23 CAROTID STENOSIS, ASYMPTOMATIC, BILATERAL: ICD-10-CM

## 2023-07-31 PROCEDURE — 99214 OFFICE O/P EST MOD 30 MIN: CPT | Performed by: INTERNAL MEDICINE

## 2023-07-31 RX ORDER — ACETAMINOPHEN 500 MG
500 TABLET ORAL 2 TIMES DAILY PRN
COMMUNITY

## 2023-07-31 NOTE — PATIENT INSTRUCTIONS
Patient was advised to continue present medications. discussed with the patient medications and laboratory data in detail. Follow-up with me in 3 months or as advised. If any blood test was ordered please do 1 week prior to next appointment unless advise to get earlier.   If you have any questions please call the office [phone]

## 2023-07-31 NOTE — PROGRESS NOTES
Assessment/Plan:    1. Acquired hypothyroidism  -     CBC and differential; Future  -     TSH, 3rd generation; Future    2. Gastroesophageal reflux disease without esophagitis    3. Essential hypertension  -     CBC and differential; Future  -     Comprehensive metabolic panel; Future    4. Carotid stenosis, asymptomatic, bilateral    5. Aortic valve stenosis, etiology of cardiac valve disease unspecified    6. Primary osteoarthritis involving multiple joints    7. Stage 3a chronic kidney disease (HCC)  -     CBC and differential; Future  -     Comprehensive metabolic panel; Future    8. BMI 29.0-29.9,adult  Assessment & Plan:  Patient  was advised to decrease portion size. Advised to decrease carb, sugar, cholesterol intake. Advised to exercise 3-5 times per week. Advised to lose weight. 9. Mixed hyperlipidemia  -     Comprehensive metabolic panel; Future    10. Encounter for screening mammogram for breast cancer  -     Comprehensive metabolic panel; Future      Her TSH is 1.9 advised to continue same dose of levothyroxine. Cholesterol well controlled. Cholesterol 143, HDL 67 advised to continue same medication and low-cholesterol diet. Blood pressure at home better than doctor's office. Her blood pressure was 122/86 when she went to see her cardiologist last week. Advised to monitor blood pressure at home. Continue present medications and low salt diet. Advised to continue present medications. She takes occasionally acetaminophen as needed for pain in joints. She has been seen and followed by vascular surgery for carotid artery stenosis. Presently asymptomatic. Advised to continue present medications. Her GE reflux much better and well controlled on famotidine which she takes as needed and advised to watch diet and reflux precautions. Her kidney function stable. Advised to maintain hydration. Subjective: Patient presents for follow-up. Patient ID: Zachery Boas is a 80 y.o. female. HPI   77-year-old white female patient presents to follow-up her medical problems. She denies any chest pain, shortness of breath, pain in the abdomen. Denies any cough, fever, chills. Has nausea vomiting diarrhea. She was seen by her cardiologist Dr. Rosa Albrecht last week. Overall she is doing well and has no new problems. The following portions of the patient's history were reviewed and updated as appropriate:     Past Medical History:  She has a past medical history of Allergic rhinitis, Aortic stenosis, moderate, Bleeding per rectum, Blood glucose elevated, BMI 26.0-26.9,adult (11/8/2022), BMI 29.0-29.9,adult (10/19/2022), BMI 30.0-30.9,adult (10/14/2021), BMI 32.0-32.9,adult (2/24/2021), Carotid arterial disease ([address]), Colonoscopy refused, Conjunctivitis, allergic, Constipation, Disease of thyroid gland, Elbow fracture, Encounter for support and coordination of transition of care (11/8/2022), Excessive cerumen in right ear canal (11/8/2022), GERD (gastroesophageal reflux disease), Herpes zoster, Hyperlipidemia, Hypertension, Medicare annual wellness visit, subsequent (10/14/2021), Medicare annual wellness visit, subsequent (10/19/2022), Neck pain (4/24/2023), Osteoarthritis of multiple joints (1/14/2022), Other constipation (11/8/2022), Other specified anemias (11/8/2022), Pacemaker, Post herpetic neuralgia, Skin rash (5/26/2021), Stage 3a chronic kidney disease ([address]) (1/14/2022), and Vomiting (11/8/2022). ,  _______________________________________________________________________  Past Surgical History:   has a past surgical history that includes Joint replacement; Tubal ligation; Cardiac pacemaker placement; and Total hip arthroplasty (Left, 02/2015). ,  _______________________________________________________________________  Family History:  family history includes Cancer in her father; Heart disease in her mother.,  _______________________________________________________________________  Social History:   reports that she has never smoked. She has never used smokeless tobacco. She reports that she does not drink alcohol and does not use drugs. ,  _______________________________________________________________________  Allergies:  is allergic to ampicillin, codeine, and penicillins. .  _______________________________________________________________________  Current Outpatient Medications   Medication Sig Dispense Refill   • acetaminophen (TYLENOL) 500 mg tablet Take 500 mg by mouth 2 (two) times a day as needed     • amLODIPine (NORVASC) 5 mg tablet Take 1 tablet (5 mg total) by mouth daily at bedtime  0   • aspirin 81 mg chewable tablet Chew 81 mg daily     • carvedilol (COREG) 12.5 mg tablet Take 1 tablet (12.5 mg total) by mouth 2 (two) times a day with meals 60 tablet 0   • enalapril (VASOTEC) 20 mg tablet Take 20 mg by mouth 2 (two) times a day     • ezetimibe (ZETIA) 10 mg tablet Take 10 mg by mouth daily     • famotidine (PEPCID) 20 mg tablet Take 20 mg by mouth as needed     • latanoprost (XALATAN) 0.005 % ophthalmic solution INSTILL 1 DROP IN EACH EYE AT BEDTIME     • levothyroxine 50 mcg tablet TAKE 1 TABLET BY MOUTH EVERY DAY 30 tablet 2   • pravastatin (PRAVACHOL) 40 mg tablet Take 40 mg by mouth daily at bedtime Mon,Friday x2, one ever other day x1       No current facility-administered medications for this visit.     _______________________________________________________________________  Review of Systems   Constitutional: Negative for chills and fever. HENT: Negative for congestion, ear pain, hearing loss, nosebleeds, sinus pain, sore throat and trouble swallowing. Eyes: Negative for discharge, redness and visual disturbance. Respiratory: Negative for cough, chest tightness and shortness of breath. Cardiovascular: Negative for chest pain and palpitations. Gastrointestinal: Negative for abdominal pain, blood in stool, constipation, diarrhea, nausea and vomiting.    Genitourinary: Negative for dysuria, flank pain and hematuria. Musculoskeletal: Positive for arthralgias ( Sometimes gets pain in the knees  for which she takes some time acetaminophen ). Negative for myalgias and neck pain. Skin: Negative for color change and rash. Neurological: Negative for dizziness, speech difficulty, weakness and headaches. Hematological: Does not bruise/bleed easily. Psychiatric/Behavioral: Negative for agitation and behavioral problems. Objective:  Vitals:    07/31/23 0928   BP: 138/84   BP Location: Left arm   Patient Position: Sitting   Cuff Size: Standard   Pulse: 74   Resp: 18   Temp: 97.9 °F (36.6 °C)   TempSrc: Temporal   SpO2: 98%   Weight: 73.5 kg (162 lb)   Height: 5' 2" (1.575 m)     Body mass index is 29.63 kg/m². Physical Exam  Vitals and nursing note reviewed. Constitutional:       General: She is not in acute distress. Appearance: Normal appearance. HENT:      Head: Normocephalic and atraumatic. Left Ear: Ear canal normal.      Nose: Nose normal.      Mouth/Throat:      Mouth: Mucous membranes are moist.   Eyes:      General: No scleral icterus. Right eye: No discharge. Left eye: No discharge. Extraocular Movements: Extraocular movements intact. Conjunctiva/sclera: Conjunctivae normal.   Cardiovascular:      Rate and Rhythm: Normal rate and regular rhythm. Pulses: Normal pulses. Heart sounds: Murmur heard. Pulmonary:      Effort: Pulmonary effort is normal. No respiratory distress. Breath sounds: Normal breath sounds. No wheezing, rhonchi or rales. Abdominal:      General: Bowel sounds are normal.      Palpations: Abdomen is soft. Tenderness: There is no abdominal tenderness. Musculoskeletal:         General: Normal range of motion. Cervical back: Normal range of motion and neck supple. No muscular tenderness. Right lower leg: No edema. Left lower leg: No edema.    Skin:     General: Skin is warm.      Findings: No rash. Neurological:      General: No focal deficit present. Mental Status: She is alert and oriented to person, place, and time. Motor: No weakness. Psychiatric:         Mood and Affect: Mood normal.         Behavior: Behavior normal.         I spent 30 minutes with the patient today.   More than 50% time spent for reviewing of external notes, reviewing of the results of diagnostics test, management of care, patient education and ordering of test.

## 2023-09-06 ENCOUNTER — HOSPITAL ENCOUNTER (OUTPATIENT)
Dept: NON INVASIVE DIAGNOSTICS | Facility: CLINIC | Age: 88
Discharge: HOME | End: 2023-09-06
Payer: MEDICARE

## 2023-09-06 DIAGNOSIS — I65.23 CAROTID STENOSIS, ASYMPTOMATIC, BILATERAL: ICD-10-CM

## 2023-09-06 PROCEDURE — 93880 EXTRACRANIAL BILAT STUDY: CPT | Performed by: SURGERY

## 2023-09-06 PROCEDURE — 93880 EXTRACRANIAL BILAT STUDY: CPT

## 2023-09-07 ENCOUNTER — TRANSCRIBE ORDERS (OUTPATIENT)
Dept: VASCULAR SURGERY | Facility: CLINIC | Age: 88
End: 2023-09-07

## 2023-09-07 DIAGNOSIS — I65.23 CAROTID STENOSIS, ASYMPTOMATIC, BILATERAL: Primary | ICD-10-CM

## 2023-09-17 DIAGNOSIS — E03.9 ACQUIRED HYPOTHYROIDISM: ICD-10-CM

## 2023-09-18 RX ORDER — LEVOTHYROXINE SODIUM 0.05 MG/1
TABLET ORAL
Qty: 30 TABLET | Refills: 2 | Status: SHIPPED | OUTPATIENT
Start: 2023-09-18

## 2023-10-07 ENCOUNTER — APPOINTMENT (OUTPATIENT)
Dept: LAB | Facility: CLINIC | Age: 88
End: 2023-10-07
Payer: MEDICARE

## 2023-10-07 DIAGNOSIS — E03.9 ACQUIRED HYPOTHYROIDISM: ICD-10-CM

## 2023-10-07 DIAGNOSIS — N18.31 STAGE 3A CHRONIC KIDNEY DISEASE (HCC): ICD-10-CM

## 2023-10-07 DIAGNOSIS — Z12.31 ENCOUNTER FOR SCREENING MAMMOGRAM FOR BREAST CANCER: ICD-10-CM

## 2023-10-07 DIAGNOSIS — E78.2 MIXED HYPERLIPIDEMIA: ICD-10-CM

## 2023-10-07 DIAGNOSIS — I10 ESSENTIAL HYPERTENSION: ICD-10-CM

## 2023-10-07 DIAGNOSIS — R73.09 IMPAIRED GLUCOSE TOLERANCE TEST: ICD-10-CM

## 2023-10-07 LAB
ALBUMIN SERPL BCP-MCNC: 4.1 G/DL (ref 3.5–5)
ALP SERPL-CCNC: 61 U/L (ref 34–104)
ALT SERPL W P-5'-P-CCNC: 16 U/L (ref 7–52)
ANION GAP SERPL CALCULATED.3IONS-SCNC: 8 MMOL/L
AST SERPL W P-5'-P-CCNC: 20 U/L (ref 13–39)
BASOPHILS # BLD AUTO: 0.04 THOUSANDS/ÂΜL (ref 0–0.1)
BASOPHILS NFR BLD AUTO: 1 % (ref 0–1)
BILIRUB SERPL-MCNC: 0.58 MG/DL (ref 0.2–1)
BUN SERPL-MCNC: 13 MG/DL (ref 5–25)
CALCIUM SERPL-MCNC: 9.7 MG/DL (ref 8.4–10.2)
CHLORIDE SERPL-SCNC: 105 MMOL/L (ref 96–108)
CHOLEST SERPL-MCNC: 141 MG/DL
CK SERPL-CCNC: 80 U/L (ref 26–192)
CO2 SERPL-SCNC: 22 MMOL/L (ref 21–32)
CREAT SERPL-MCNC: 0.97 MG/DL (ref 0.6–1.3)
EOSINOPHIL # BLD AUTO: 0.28 THOUSAND/ÂΜL (ref 0–0.61)
EOSINOPHIL NFR BLD AUTO: 5 % (ref 0–6)
ERYTHROCYTE [DISTWIDTH] IN BLOOD BY AUTOMATED COUNT: 12.6 % (ref 11.6–15.1)
GFR SERPL CREATININE-BSD FRML MDRD: 51 ML/MIN/1.73SQ M
GLUCOSE P FAST SERPL-MCNC: 99 MG/DL (ref 65–99)
HCT VFR BLD AUTO: 37.5 % (ref 34.8–46.1)
HDLC SERPL-MCNC: 57 MG/DL
HGB BLD-MCNC: 12.8 G/DL (ref 11.5–15.4)
IMM GRANULOCYTES # BLD AUTO: 0.05 THOUSAND/UL (ref 0–0.2)
IMM GRANULOCYTES NFR BLD AUTO: 1 % (ref 0–2)
LDLC SERPL CALC-MCNC: 67 MG/DL (ref 0–100)
LYMPHOCYTES # BLD AUTO: 1.32 THOUSANDS/ÂΜL (ref 0.6–4.47)
LYMPHOCYTES NFR BLD AUTO: 23 % (ref 14–44)
MCH RBC QN AUTO: 32.8 PG (ref 26.8–34.3)
MCHC RBC AUTO-ENTMCNC: 34.1 G/DL (ref 31.4–37.4)
MCV RBC AUTO: 96 FL (ref 82–98)
MONOCYTES # BLD AUTO: 0.53 THOUSAND/ÂΜL (ref 0.17–1.22)
MONOCYTES NFR BLD AUTO: 9 % (ref 4–12)
NEUTROPHILS # BLD AUTO: 3.54 THOUSANDS/ÂΜL (ref 1.85–7.62)
NEUTS SEG NFR BLD AUTO: 61 % (ref 43–75)
NONHDLC SERPL-MCNC: 84 MG/DL
NRBC BLD AUTO-RTO: 0 /100 WBCS
PLATELET # BLD AUTO: 239 THOUSANDS/UL (ref 149–390)
PMV BLD AUTO: 9.7 FL (ref 8.9–12.7)
POTASSIUM SERPL-SCNC: 4.6 MMOL/L (ref 3.5–5.3)
PROT SERPL-MCNC: 6.9 G/DL (ref 6.4–8.4)
RBC # BLD AUTO: 3.9 MILLION/UL (ref 3.81–5.12)
SODIUM SERPL-SCNC: 135 MMOL/L (ref 135–147)
TRIGL SERPL-MCNC: 83 MG/DL
TSH SERPL DL<=0.05 MIU/L-ACNC: 1.83 UIU/ML (ref 0.45–4.5)
WBC # BLD AUTO: 5.76 THOUSAND/UL (ref 4.31–10.16)

## 2023-10-07 PROCEDURE — 36415 COLL VENOUS BLD VENIPUNCTURE: CPT

## 2023-10-07 PROCEDURE — 80061 LIPID PANEL: CPT

## 2023-10-07 PROCEDURE — 85025 COMPLETE CBC W/AUTO DIFF WBC: CPT

## 2023-10-07 PROCEDURE — 82550 ASSAY OF CK (CPK): CPT

## 2023-10-07 PROCEDURE — 80053 COMPREHEN METABOLIC PANEL: CPT

## 2023-10-07 PROCEDURE — 84443 ASSAY THYROID STIM HORMONE: CPT

## 2023-10-27 NOTE — PROGRESS NOTES
Assessment and Plan:     Problem List Items Addressed This Visit    None      Depression Screening and Follow-up Plan: Patient was screened for depression during today's encounter. They screened negative with a PHQ-2 score of 0. Preventive health issues were discussed with patient, and age appropriate screening tests were ordered as noted in patient's After Visit Summary. Personalized health advice and appropriate referrals for health education or preventive services given if needed, as noted in patient's After Visit Summary.      History of Present Illness:     Patient presents for a Medicare Wellness Visit    HPI   Patient Care Team:  Anyi Christensen MD as PCP - General (Internal Medicine)  MD Jovani Rubio [place]  Flori Bae MD     Review of Systems:     Review of Systems     Problem List:     Patient Active Problem List   Diagnosis    Spiral fracture of shaft of humerus    SDH (subdural hematoma) (HCC)    Aortic valve stenosis    Essential hypertension    Carotid stenosis, asymptomatic, bilateral    Gastroesophageal reflux disease without esophagitis    Acquired hypothyroidism    Primary osteoarthritis involving multiple joints    Mixed hyperlipidemia    History of permanent cardiac pacemaker placement    Hyperglycemia    BMI 32.0-32.9,adult    BMI 31.0-31.9,adult    Skin rash    BMI 30.0-30.9,adult    Stage 3a chronic kidney disease (HCC)    Osteoarthritis of multiple joints    BMI 29.0-29.9,adult    Intractable nausea and vomiting    Dizziness    Encounter for support and coordination of transition of care    Other specified anemias    Other constipation    BMI 26.0-26.9,adult    Vomiting    Neck pain      Past Medical and Surgical History:     Past Medical History:   Diagnosis Date    Allergic rhinitis     Aortic stenosis, moderate     Bleeding per rectum     Blood glucose elevated     BMI 26.0-26.9,adult 11/8/2022    BMI 29.0-29.9,adult 10/19/2022    BMI 30.0-30.9,adult 10/14/2021    BMI 32.0-32.9,adult 2/24/2021    Carotid arterial disease ([address])     Colonoscopy refused     Conjunctivitis, allergic     Constipation     Disease of thyroid gland     Elbow fracture     Encounter for support and coordination of transition of care 11/8/2022    Excessive cerumen in right ear canal 11/8/2022    GERD (gastroesophageal reflux disease)     Herpes zoster     Hyperlipidemia     Hypertension     Medicare annual wellness visit, subsequent 10/14/2021    Medicare annual wellness visit, subsequent 10/19/2022    Neck pain 4/24/2023    Osteoarthritis of multiple joints 1/14/2022    Other constipation 11/8/2022    Other specified anemias 11/8/2022    Pacemaker     Post herpetic neuralgia     Skin rash 5/26/2021    Stage 3a chronic kidney disease ([address]) 1/14/2022    Vomiting 11/8/2022     Past Surgical History:   Procedure Laterality Date    CARDIAC PACEMAKER PLACEMENT      ppm    JOINT REPLACEMENT      both hips    TOTAL HIP ARTHROPLASTY Left 02/2015    TUBAL LIGATION        Family History:     Family History   Problem Relation Age of Onset    Heart disease Mother     Cancer Father         colon      Social History:     Social History     Socioeconomic History    Marital status:       Spouse name: None    Number of children: None    Years of education: None    Highest education level: None   Occupational History    Occupation: Retired    Tobacco Use    Smoking status: Never    Smokeless tobacco: Never   Vaping Use    Vaping Use: Never used   Substance and Sexual Activity    Alcohol use: Never    Drug use: No    Sexual activity: Not Currently   Other Topics Concern    None   Social History Narrative    Lives alone    Mammogram: Advised, pt refused    Colonoscopy: advised, pt refused    Annual eye exam: sees q2y ophth    Bone density study: Advised, pt refused    Annual dental checkup: Sees dentist as needed    - As per [employer] [facility]      Social Determinants of Health     Financial Resource Strain: Low Risk  (10/31/2023)    Overall Financial Resource Strain (CARDIA)     Difficulty of Paying Living Expenses: Not hard at all   Food Insecurity: No Food Insecurity (11/3/2022)    Hunger Vital Sign     Worried About Running Out of Food in the Last Year: Never true     Ran Out of Food in the Last Year: Never true   Transportation Needs: No Transportation Needs (10/31/2023)    PRAPARE - Transportation     Lack of Transportation (Medical): No     Lack of Transportation (Non-Medical): No   Physical Activity: Not on file   Stress: Not on file   Social Connections: Not on file   Intimate Partner Violence: Not on file   Housing Stability: Unknown (11/3/2022)    Housing Stability Vital Sign     Unable to Pay for Housing in the Last Year: No     Number of Places Lived in the Last Year: Not on file     Unstable Housing in the Last Year: No      Medications and Allergies:     Current Outpatient Medications   Medication Sig Dispense Refill    acetaminophen (TYLENOL) 500 mg tablet Take 500 mg by mouth 2 (two) times a day as needed      amLODIPine (NORVASC) 5 mg tablet Take 1 tablet (5 mg total) by mouth daily at bedtime  0    aspirin 81 mg chewable tablet Chew 81 mg daily      carvedilol (COREG) 12.5 mg tablet Take 1 tablet (12.5 mg total) by mouth 2 (two) times a day with meals 60 tablet 0    enalapril (VASOTEC) 20 mg tablet Take 20 mg by mouth 2 (two) times a day      ezetimibe (ZETIA) 10 mg tablet Take 10 mg by mouth daily      famotidine (PEPCID) 20 mg tablet Take 20 mg by mouth as needed      latanoprost (XALATAN) 0.005 % ophthalmic solution INSTILL 1 DROP IN EACH EYE AT BEDTIME      levothyroxine 50 mcg tablet TAKE 1 TABLET BY MOUTH EVERY DAY 30 tablet 2    pravastatin (PRAVACHOL) 40 mg tablet Take 40 mg by mouth daily at bedtime Mon,Friday x2, one ever other day x1       No current facility-administered medications for this visit.      Allergies   Allergen Reactions    Ampicillin     Codeine GI Intolerance Light headed    Penicillins GI Intolerance      Immunizations:     Immunization History   Administered Date(s) Administered    COVID-19 MODERNA VACC 0.25 ML IM BOOSTER 12/15/2021    COVID-19 MODERNA VACC 0.5 ML IM 01/29/2021, 02/25/2021    INFLUENZA 10/19/2022    Influenza, high dose seasonal 0.7 mL 10/14/2021, 10/19/2022      Health Maintenance: There are no preventive care reminders to display for this patient. Topic Date Due    Pneumococcal Vaccine: 65+ Years (1 - PCV) Never done    COVID-19 Vaccine (4 - Moderna series) 02/09/2022    Influenza Vaccine (1) 09/01/2023      Medicare Screening Tests and Risk Assessments:     Carry Ormond is here for her Subsequent Wellness visit. Health Risk Assessment:   Patient rates overall health as excellent. Patient feels that their physical health rating is same. Patient is very satisfied with their life. Eyesight was rated as same. Hearing was rated as same. Patient feels that their emotional and mental health rating is same. Patients states they are never, rarely angry. Patient states they are often unusually tired/fatigued. Pain experienced in the last 7 days has been some. Patient's pain rating has been 1/10. Patient states that she has experienced no weight loss or gain in last 6 months. Depression Screening:   PHQ-2 Score: 0      Fall Risk Screening: In the past year, patient has experienced: no history of falling in past year      Urinary Incontinence Screening:   Patient has not leaked urine accidently in the last six months. Home Safety:  Patient does not have trouble with stairs inside or outside of their home. Patient has working smoke alarms and has working carbon monoxide detector. Home safety hazards include: none. Nutrition:   Current diet is Limited junk food, Low Cholesterol, Low Carb and No Added Salt. Medications:   Patient is currently taking over-the-counter supplements.  OTC medications include: see medication list. Patient is able to manage medications. Activities of Daily Living (ADLs)/Instrumental Activities of Daily Living (IADLs):   Walk and transfer into and out of bed and chair?: Yes  Dress and groom yourself?: Yes    Bathe or shower yourself?: Yes    Feed yourself? Yes  Do your laundry/housekeeping?: Yes  Manage your money, pay your bills and track your expenses?: Yes  Make your own meals?: Yes    Do your own shopping?: Yes    Previous Hospitalizations:   Any hospitalizations or ED visits within the last 12 months?: Yes    How many hospitalizations have you had in the last year?: 1-2    Advance Care Planning:   Living will: Yes    Durable POA for healthcare: Yes    Advanced directive: Yes    Advanced directive counseling given: Yes    ACP document given: Yes    Patient declined ACP directive: No      Cognitive Screening:   Provider or family/friend/caregiver concerned regarding cognition?: No    PREVENTIVE SCREENINGS      Cardiovascular Screening:    General: Screening Current      Diabetes Screening:     General: Screening Current      Colorectal Cancer Screening:     General: Screening Not Indicated      Breast Cancer Screening:     General: Screening Not Indicated      Cervical Cancer Screening:    General: Screening Not Indicated      Osteoporosis Screening:    General: Patient Declines      Abdominal Aortic Aneurysm (AAA) Screening:        General: Screening Not Indicated      Lung Cancer Screening:     General: Screening Not Indicated    Screening, Brief Intervention, and Referral to Treatment (SBIRT)    Screening  Typical number of drinks in a day: 0  Typical number of drinks in a week: 0  Interpretation: Low risk drinking behavior.     Single Item Drug Screening:  How often have you used an illegal drug (including marijuana) or a prescription medication for non-medical reasons in the past year? never    Single Item Drug Screen Score: 0  Interpretation: Negative screen for possible drug use disorder    Brief Intervention  Alcohol & drug use screenings were reviewed. No concerns regarding substance use disorder identified. Other Counseling Topics:   Car/seat belt/driving safety, skin self-exam, sunscreen and calcium and vitamin D intake and regular weightbearing exercise. She does not drive    No results found.      Physical Exam:     /80 (BP Location: Left arm, Patient Position: Sitting, Cuff Size: Standard)   Pulse 85   Temp 97.9 °F (36.6 °C) (Temporal)   Resp 18   Ht 5' 2" (1.575 m)   Wt 73 kg (161 lb)   SpO2 99%   BMI 29.45 kg/m²     Physical Exam     Zuleyka Dee MD

## 2023-10-31 ENCOUNTER — OFFICE VISIT (OUTPATIENT)
Dept: INTERNAL MEDICINE CLINIC | Facility: CLINIC | Age: 88
End: 2023-10-31
Payer: MEDICARE

## 2023-10-31 VITALS
WEIGHT: 161 LBS | HEIGHT: 62 IN | HEART RATE: 80 BPM | TEMPERATURE: 97.9 F | OXYGEN SATURATION: 99 % | BODY MASS INDEX: 29.63 KG/M2 | DIASTOLIC BLOOD PRESSURE: 82 MMHG | SYSTOLIC BLOOD PRESSURE: 140 MMHG | RESPIRATION RATE: 18 BRPM

## 2023-10-31 DIAGNOSIS — I10 ESSENTIAL HYPERTENSION: ICD-10-CM

## 2023-10-31 DIAGNOSIS — E78.2 MIXED HYPERLIPIDEMIA: ICD-10-CM

## 2023-10-31 DIAGNOSIS — I35.0 AORTIC VALVE STENOSIS, ETIOLOGY OF CARDIAC VALVE DISEASE UNSPECIFIED: ICD-10-CM

## 2023-10-31 DIAGNOSIS — I65.23 CAROTID STENOSIS, ASYMPTOMATIC, BILATERAL: ICD-10-CM

## 2023-10-31 DIAGNOSIS — Z00.00 MEDICARE ANNUAL WELLNESS VISIT, SUBSEQUENT: Primary | ICD-10-CM

## 2023-10-31 DIAGNOSIS — E03.9 ACQUIRED HYPOTHYROIDISM: ICD-10-CM

## 2023-10-31 DIAGNOSIS — Z23 NEED FOR PROPHYLACTIC VACCINATION AND INOCULATION AGAINST INFLUENZA: ICD-10-CM

## 2023-10-31 DIAGNOSIS — N18.31 STAGE 3A CHRONIC KIDNEY DISEASE (HCC): ICD-10-CM

## 2023-10-31 DIAGNOSIS — K21.9 GASTROESOPHAGEAL REFLUX DISEASE WITHOUT ESOPHAGITIS: ICD-10-CM

## 2023-10-31 PROCEDURE — G0439 PPPS, SUBSEQ VISIT: HCPCS | Performed by: INTERNAL MEDICINE

## 2023-10-31 PROCEDURE — G0008 ADMIN INFLUENZA VIRUS VAC: HCPCS | Performed by: INTERNAL MEDICINE

## 2023-10-31 PROCEDURE — 90662 IIV NO PRSV INCREASED AG IM: CPT | Performed by: INTERNAL MEDICINE

## 2023-10-31 PROCEDURE — 99213 OFFICE O/P EST LOW 20 MIN: CPT | Performed by: INTERNAL MEDICINE

## 2023-10-31 NOTE — PROGRESS NOTES
Assessment/Plan:    1. Medicare annual wellness visit, subsequent    2. Acquired hypothyroidism    3. Gastroesophageal reflux disease without esophagitis    4. Aortic valve stenosis, etiology of cardiac valve disease unspecified    5. Essential hypertension    6. Carotid stenosis, asymptomatic, bilateral    7. Stage 3a chronic kidney disease ([address])    8. Mixed hyperlipidemia    9. BMI 29.0-29.9,adult    10. Need for prophylactic vaccination and inoculation against influenza  -     influenza vaccine, high-dose, PF 0.7 mL (FLUZONE HIGH-DOSE)    TSH 1.83 to continue same dose of levothyroxine. Blood pressure well controlled. Time she checks blood pressure at home is around 120s over 70s. Advised medication and low-salt diet. GE reflux well controlled on famotidine. Has been seen and followed by cardiologist for aortic valve stenosis. Stable asymptomatic. He had carotid artery Doppler no new changes. Been seen and followed by vascular surgery. CAD is stable. Advised to maintain hydration. Lipidemia well-controlled. 141 triglyceride 83, HDL 57, LDL 67. To continue present medication low-cholesterol diet. Advised to lose weight exercise as much as you can. Depression Screening and Follow-up Plan: Patient was screened for depression during today's encounter. They screened negative with a PHQ-2 score of 0. Subjective: Patient presents for annual wellness exam as well as follow-up her medical problems. Patient ID: Xin Flores is a 80 y.o. female. HPI  15-year-old white female patient presents for annual wellness exam as well as follow-up her medical problems. Denies any chest pain, shortness of breath, pain abdomen. Cough, fever, chills. Denies nausea vomiting diarrhea.     The following portions of the patient's history were reviewed and updated as appropriate:     Past Medical History:  She has a past medical history of Allergic rhinitis, Aortic stenosis, moderate, Bleeding per rectum, Blood glucose elevated, BMI 26.0-26.9,adult (11/08/2022), BMI 29.0-29.9,adult (10/19/2022), BMI 30.0-30.9,adult (10/14/2021), BMI 32.0-32.9,adult (02/24/2021), Carotid arterial disease ([address]), Colonoscopy refused, Conjunctivitis, allergic, Constipation, Disease of thyroid gland, Elbow fracture, Encounter for support and coordination of transition of care (11/08/2022), Excessive cerumen in right ear canal (11/08/2022), GERD (gastroesophageal reflux disease), Herpes zoster, Hyperlipidemia, Hypertension, Medicare annual wellness visit, subsequent (10/14/2021), Medicare annual wellness visit, subsequent (10/19/2022), Medicare annual wellness visit, subsequent (10/31/2023), Neck pain (04/24/2023), Osteoarthritis of multiple joints (01/14/2022), Other constipation (11/08/2022), Other specified anemias (11/08/2022), Pacemaker, Post herpetic neuralgia, Skin rash (05/26/2021), Stage 3a chronic kidney disease ([address]) (01/14/2022), and Vomiting (11/08/2022). ,  _______________________________________________________________________  Past Surgical History:   has a past surgical history that includes Joint replacement; Tubal ligation; Cardiac pacemaker placement; and Total hip arthroplasty (Left, 02/2015). ,  _______________________________________________________________________  Family History:  family history includes Cancer in her father; Heart disease in her mother.,  _______________________________________________________________________  Social History:   reports that she has never smoked. She has never used smokeless tobacco. She reports that she does not drink alcohol and does not use drugs. ,  _______________________________________________________________________  Allergies:  is allergic to ampicillin, codeine, and penicillins. .  _______________________________________________________________________  Current Outpatient Medications   Medication Sig Dispense Refill    acetaminophen (TYLENOL) 500 mg tablet Take 500 mg by mouth 2 (two) times a day as needed      amLODIPine (NORVASC) 5 mg tablet Take 1 tablet (5 mg total) by mouth daily at bedtime  0    aspirin 81 mg chewable tablet Chew 81 mg daily      carvedilol (COREG) 12.5 mg tablet Take 1 tablet (12.5 mg total) by mouth 2 (two) times a day with meals 60 tablet 0    enalapril (VASOTEC) 20 mg tablet Take 20 mg by mouth 2 (two) times a day      ezetimibe (ZETIA) 10 mg tablet Take 10 mg by mouth daily      famotidine (PEPCID) 20 mg tablet Take 20 mg by mouth as needed      latanoprost (XALATAN) 0.005 % ophthalmic solution INSTILL 1 DROP IN EACH EYE AT BEDTIME      levothyroxine 50 mcg tablet TAKE 1 TABLET BY MOUTH EVERY DAY 30 tablet 2    pravastatin (PRAVACHOL) 40 mg tablet Take 40 mg by mouth daily at bedtime Mon,Friday x2, one ever other day x1       No current facility-administered medications for this visit.     _______________________________________________________________________  Review of Systems   Constitutional:  Negative for chills and fever. HENT:  Negative for congestion, ear pain, hearing loss, nosebleeds, sinus pain, sore throat and trouble swallowing. Eyes:  Negative for discharge, redness and visual disturbance. Respiratory:  Negative for cough, chest tightness and shortness of breath. Cardiovascular:  Negative for chest pain and palpitations. Gastrointestinal:  Negative for abdominal pain, blood in stool, constipation, diarrhea, nausea and vomiting. Genitourinary:  Negative for dysuria, flank pain and hematuria. Musculoskeletal:  Positive for arthralgias (Sometimes gets pain in the knees but not bad.). Negative for neck pain. Skin:  Negative for color change and rash. Neurological:  Negative for dizziness, speech difficulty, weakness and headaches. Hematological:  Does not bruise/bleed easily. Psychiatric/Behavioral:  Negative for agitation and behavioral problems.           Objective:  Vitals:    10/31/23 0855   BP: 140/82   BP Location: Left arm   Patient Position: Sitting   Cuff Size: Standard   Pulse: 80   Resp: 18   Temp: 97.9 °F (36.6 °C)   TempSrc: Temporal   SpO2: 99%   Weight: 73 kg (161 lb)   Height: 5' 2" (1.575 m)     Body mass index is 29.45 kg/m². Physical Exam  Vitals and nursing note reviewed. Constitutional:       General: She is not in acute distress. Appearance: Normal appearance. HENT:      Head: Normocephalic and atraumatic. Right Ear: Ear canal and external ear normal.      Left Ear: Ear canal and external ear normal.      Nose: Nose normal.      Mouth/Throat:      Mouth: Mucous membranes are moist.   Eyes:      General: No scleral icterus. Right eye: No discharge. Left eye: No discharge. Extraocular Movements: Extraocular movements intact. Conjunctiva/sclera: Conjunctivae normal.   Cardiovascular:      Rate and Rhythm: Normal rate and regular rhythm. Pulses: Normal pulses. Heart sounds: Murmur heard. Pulmonary:      Effort: Pulmonary effort is normal. No respiratory distress. Breath sounds: Normal breath sounds. Abdominal:      General: Bowel sounds are normal.      Palpations: Abdomen is soft. Tenderness: There is no abdominal tenderness. Musculoskeletal:         General: Normal range of motion. Cervical back: Normal range of motion and neck supple. No muscular tenderness. Right lower leg: No edema. Left lower leg: No edema. Skin:     General: Skin is warm. Findings: No rash. Neurological:      General: No focal deficit present. Mental Status: She is alert and oriented to person, place, and time. Motor: No weakness.    Psychiatric:         Mood and Affect: Mood normal.         Behavior: Behavior normal.

## 2023-10-31 NOTE — PATIENT INSTRUCTIONS
Patient was advised to continue present medications. discussed with the patient medications and laboratory data in detail. Follow-up with me in 3 months or as advised. If any blood test was ordered please do 1 week prior to next appointment unless advise to get earlier.   If you have any questions please call the office [phone]

## 2023-12-13 ENCOUNTER — TELEPHONE (OUTPATIENT)
Age: 88
End: 2023-12-13

## 2023-12-13 ENCOUNTER — TELEMEDICINE (OUTPATIENT)
Dept: INTERNAL MEDICINE CLINIC | Facility: CLINIC | Age: 88
End: 2023-12-13
Payer: MEDICARE

## 2023-12-13 DIAGNOSIS — I10 ESSENTIAL HYPERTENSION: ICD-10-CM

## 2023-12-13 DIAGNOSIS — N18.31 STAGE 3A CHRONIC KIDNEY DISEASE (HCC): ICD-10-CM

## 2023-12-13 DIAGNOSIS — U07.1 COVID-19: Primary | ICD-10-CM

## 2023-12-13 PROCEDURE — 99442 PR PHYS/QHP TELEPHONE EVALUATION 11-20 MIN: CPT | Performed by: INTERNAL MEDICINE

## 2023-12-13 RX ORDER — BENZONATATE 100 MG/1
100 CAPSULE ORAL 3 TIMES DAILY PRN
Qty: 20 CAPSULE | Refills: 0 | Status: SHIPPED | OUTPATIENT
Start: 2023-12-13

## 2023-12-13 RX ORDER — NIRMATRELVIR AND RITONAVIR 150-100 MG
2 KIT ORAL 2 TIMES DAILY
Qty: 20 TABLET | Refills: 0 | Status: SHIPPED | OUTPATIENT
Start: 2023-12-13 | End: 2023-12-18

## 2023-12-13 NOTE — ASSESSMENT & PLAN NOTE
Patient developed headache, body ache, cough on 12/10/2023. She tested COVID-19 test last night which was positive. Denies any chest pain or shortness of breath. Denies nausea vomiting diarrhea. Abdomen. She did not got her last updated COVID-19 vaccination. Discussed with the patient about Paxlovid medication for COVID-19 due to risk factors and she did not get her last COVID-19 vaccination. Patient agreed to start Paxlovid so I prescribed. Also prescribed Benzonatate cough medication for cough control. Advised to maintain hydration. Advised to stop pravastatin for 7 days. Advised to take vitamin D 2000 IU daily. Patient to take acetaminophen as needed for body ache and headache. Advised to quarantine for 5 days and after 5 days if she goes she needs to wear mask for 5 more days. Advised if develop chest pain or shortness of breath to go to emergency room.

## 2023-12-13 NOTE — PROGRESS NOTES
Virtual Brief Visit    This Visit is being completed by telephone. The Patient is located at Home and in the following state in which I hold an active license [place]    The patient was identified by name and date of birth. Carmen Lucy was informed that this is a telemedicine visit and that the visit is being conducted through Telephone. My office door was closed. No one else was in the room. She acknowledged consent and understanding of privacy and security of the video platform. The patient has agreed to participate and understands they can discontinue the visit at any time. Patient is aware this is a billable service. Assessment/Plan:    Problem List Items Addressed This Visit          Cardiovascular and Mediastinum    Essential hypertension       Genitourinary    Stage 3a chronic kidney disease ([address])       Other    COVID-19 - Primary     Patient developed headache, body ache, cough on 12/10/2023. She tested COVID-19 test last night which was positive. Denies any chest pain or shortness of breath. Denies nausea vomiting diarrhea. Abdomen. She did not got her last updated COVID-19 vaccination. Discussed with the patient about Paxlovid medication for COVID-19 due to risk factors and she did not get her last COVID-19 vaccination. Patient agreed to start Paxlovid so I prescribed. Also prescribed Benzonatate cough medication for cough control. Advised to maintain hydration. Advised to stop pravastatin for 7 days. Advised to take vitamin D 2000 IU daily. Patient to take acetaminophen as needed for body ache and headache. Advised to quarantine for 5 days and after 5 days if she goes she needs to wear mask for 5 more days. Advised if develop chest pain or shortness of breath to go to emergency room.          Relevant Medications    nirmatrelvir & ritonavir (Paxlovid, 150/100,) tablet therapy pack    benzonatate (TESSALON PERLES) 100 mg capsule       Recent Visits  No visits were found meeting these conditions. Showing recent visits within past 7 days and meeting all other requirements  Today's Visits  Date Type Provider Dept   12/13/23 Telemedicine Salena Jamison MD [id number]   Showing today's visits and meeting all other requirements  Future Appointments  No visits were found meeting these conditions. Showing future appointments within next 150 days and meeting all other requirements     Subjective: Cough, body ache, headache    HPI:  68-year-old white female patient developed cough, body ache, headache since December 10, 2023. She checked COVID-19 test last night 12/12/2023 and it was positive. Denies any chest pain or shortness of breath. Denies any nausea vomiting diarrhea or pain abdomen. She did not get her last updated COVID-19 vaccination. Denies any fever or chills. Objective  On the phone she was awake alert oriented x 3.  she was not in any acute distress.     Visit Time  Total Visit Duration: 12 minutes

## 2023-12-13 NOTE — TELEPHONE ENCOUNTER
Patient tested positive for covid . She stated that she cannot do video call because she does not know how to do it.   Yvette Hewitt          Symptoms include - cough, achy , headache, congestion, hoarse voice  Tested 12/12 positive  Onset 12/10 night   Patient wants to know what to take OTC

## 2023-12-18 DIAGNOSIS — E03.9 ACQUIRED HYPOTHYROIDISM: ICD-10-CM

## 2023-12-18 RX ORDER — LEVOTHYROXINE SODIUM 0.05 MG/1
TABLET ORAL
Qty: 30 TABLET | Refills: 5 | Status: SHIPPED | OUTPATIENT
Start: 2023-12-18

## 2023-12-30 PROBLEM — Z00.00 MEDICARE ANNUAL WELLNESS VISIT, SUBSEQUENT: Status: RESOLVED | Noted: 2023-10-31 | Resolved: 2023-12-30

## 2024-03-04 ENCOUNTER — OFFICE VISIT (OUTPATIENT)
Dept: INTERNAL MEDICINE CLINIC | Facility: CLINIC | Age: 89
End: 2024-03-04
Payer: MEDICARE

## 2024-03-04 VITALS
DIASTOLIC BLOOD PRESSURE: 76 MMHG | RESPIRATION RATE: 18 BRPM | SYSTOLIC BLOOD PRESSURE: 130 MMHG | BODY MASS INDEX: 29.81 KG/M2 | TEMPERATURE: 97.8 F | WEIGHT: 162 LBS | HEIGHT: 62 IN | HEART RATE: 86 BPM | OXYGEN SATURATION: 99 %

## 2024-03-04 DIAGNOSIS — E78.2 MIXED HYPERLIPIDEMIA: ICD-10-CM

## 2024-03-04 DIAGNOSIS — I35.0 AORTIC VALVE STENOSIS, ETIOLOGY OF CARDIAC VALVE DISEASE UNSPECIFIED: ICD-10-CM

## 2024-03-04 DIAGNOSIS — M54.2 NECK PAIN: ICD-10-CM

## 2024-03-04 DIAGNOSIS — K21.9 GASTROESOPHAGEAL REFLUX DISEASE WITHOUT ESOPHAGITIS: ICD-10-CM

## 2024-03-04 DIAGNOSIS — I65.23 CAROTID STENOSIS, ASYMPTOMATIC, BILATERAL: ICD-10-CM

## 2024-03-04 DIAGNOSIS — R73.9 HYPERGLYCEMIA: ICD-10-CM

## 2024-03-04 DIAGNOSIS — Z79.899 HIGH RISK MEDICATION USE: ICD-10-CM

## 2024-03-04 DIAGNOSIS — E03.9 ACQUIRED HYPOTHYROIDISM: ICD-10-CM

## 2024-03-04 DIAGNOSIS — I10 ESSENTIAL HYPERTENSION: Primary | ICD-10-CM

## 2024-03-04 PROCEDURE — G2211 COMPLEX E/M VISIT ADD ON: HCPCS | Performed by: INTERNAL MEDICINE

## 2024-03-04 PROCEDURE — 99213 OFFICE O/P EST LOW 20 MIN: CPT | Performed by: INTERNAL MEDICINE

## 2024-03-04 NOTE — PROGRESS NOTES
Assessment/Plan:    1. Essential hypertension  -     Comprehensive metabolic panel; Future    2. Acquired hypothyroidism  -     TSH, 3rd generation; Future    3. Gastroesophageal reflux disease without esophagitis    4. Aortic valve stenosis, etiology of cardiac valve disease unspecified    5. Carotid stenosis, asymptomatic, bilateral    6. BMI 29.0-29.9,adult  Assessment & Plan:  Patient  was advised to decrease portion size.  Advised to decrease carb, sugar, cholesterol intake.  Advised to exercise 3-5 times per week as much as you can tolerate.  Advised to lose weight.      7. Mixed hyperlipidemia  -     Lipid panel; Future  -     Comprehensive metabolic panel; Future    8. Hyperglycemia  -     Comprehensive metabolic panel; Future  -     Hemoglobin A1C; Future    9. Neck pain    10. High risk medication use  -     Comprehensive metabolic panel; Future       Blood pressure controlled advised to continue present medications and low-salt diet.  Last TSH 1.8 advised to continue same dose of levothyroxine.  Will follow TSH.  GE reflux stable on famotidine as needed and diet.  For aortic valve stenosis patient has been seen and followed by cardiologist recently seen by cardiology on January 25, 2024.  Presently asymptomatic.  Patient had been seen and followed vascular surgery for coronary artery stenosis presently asymptomatic on aspirin and statin therapy.  Cholesterol 141, triglyceride 83, HDL 57, LDL 67 well-controlled hyperlipidemia on present medication and diet.  Last hemoglobin A1c 5.7 advised to watch diet for sugar and carbs intake.  Will follow blood sugar hemoglobin A1c.  She gets sometimes neck pain without radicular symptoms.  Denies any fall or injury.  Denies tingling numbness in the arms.  Patient states she takes acetaminophen as needed for neck pain which is effective.  She takes acetaminophen about once per week at the most.  Advised patient may try either Bengay or IcyHot if necessary.  Most  likely osteoarthritis.  Has a good range of motion.  Has some focal tenderness of the cervical spine area.    Subjective: Patient presents for follow-up.      Patient ID: Delaney Pierre is a 89 y.o. female.    Neck Pain   Pertinent negatives include no chest pain, fever, headaches, numbness, trouble swallowing or weakness.     89-year-old female patient presents for follow-up of her medical problems.  She denies any chest pain, shortness of breath, pain in abdomen.  Denies any cough, fever, chills.  Denies nausea vomiting diarrhea.  Complain of neck pain without any injury on and off ,not daily particular after she does certain work.  Takes acetaminophen as needed which is effective.  Denies any injury or fall.  Denies any radicular symptoms in the arms.    The following portions of the patient's history were reviewed and updated as appropriate:     Past Medical History:  She has a past medical history of Allergic rhinitis, Aortic stenosis, moderate, Bleeding per rectum, Blood glucose elevated, BMI 26.0-26.9,adult (11/08/2022), BMI 29.0-29.9,adult (10/19/2022), BMI 30.0-30.9,adult (10/14/2021), BMI 32.0-32.9,adult (02/24/2021), Carotid arterial disease (HCC), Colonoscopy refused, Conjunctivitis, allergic, Constipation, COVID-19 (12/13/2023), Disease of thyroid gland, Elbow fracture, Encounter for support and coordination of transition of care (11/08/2022), Excessive cerumen in right ear canal (11/08/2022), GERD (gastroesophageal reflux disease), Herpes zoster, Hyperlipidemia, Hypertension, Medicare annual wellness visit, subsequent (10/14/2021), Medicare annual wellness visit, subsequent (10/19/2022), Medicare annual wellness visit, subsequent (10/31/2023), Neck pain (04/24/2023), Osteoarthritis of multiple joints (01/14/2022), Other constipation (11/08/2022), Other specified anemias (11/08/2022), Pacemaker, Post herpetic neuralgia, Skin rash (05/26/2021), Stage 3a chronic kidney disease (HCC) (01/14/2022), and  Vomiting (11/08/2022).,  _______________________________________________________________________  Past Surgical History:   has a past surgical history that includes Joint replacement; Tubal ligation; Cardiac pacemaker placement; and Total hip arthroplasty (Left, 02/2015).,  _______________________________________________________________________  Family History:  family history includes Cancer in her father; Heart disease in her mother.,  _______________________________________________________________________  Social History:   reports that she has never smoked. She has never used smokeless tobacco. She reports that she does not drink alcohol and does not use drugs.,  _______________________________________________________________________  Allergies:  is allergic to ampicillin, codeine, and penicillins..  _______________________________________________________________________  Current Outpatient Medications   Medication Sig Dispense Refill    acetaminophen (TYLENOL) 500 mg tablet Take 500 mg by mouth 2 (two) times a day as needed      amLODIPine (NORVASC) 5 mg tablet Take 1 tablet (5 mg total) by mouth daily at bedtime  0    aspirin 81 mg chewable tablet Chew 81 mg daily      carvedilol (COREG) 12.5 mg tablet Take 1 tablet (12.5 mg total) by mouth 2 (two) times a day with meals 60 tablet 0    enalapril (VASOTEC) 20 mg tablet Take 20 mg by mouth 2 (two) times a day      ezetimibe (ZETIA) 10 mg tablet Take 10 mg by mouth daily      famotidine (PEPCID) 20 mg tablet Take 20 mg by mouth as needed      latanoprost (XALATAN) 0.005 % ophthalmic solution INSTILL 1 DROP IN EACH EYE AT BEDTIME      levothyroxine 50 mcg tablet TAKE 1 TABLET BY MOUTH EVERY DAY 30 tablet 5    pravastatin (PRAVACHOL) 40 mg tablet Take 40 mg by mouth daily at bedtime Mon,Friday x2, one ever other day x1       No current facility-administered medications for this visit.  "    _______________________________________________________________________  Review of Systems   Constitutional:  Negative for chills and fever.   HENT:  Negative for congestion, ear pain, hearing loss, nosebleeds, sinus pain, sore throat and trouble swallowing.    Eyes:  Negative for discharge, redness and visual disturbance.   Respiratory:  Negative for cough, chest tightness and shortness of breath.    Cardiovascular:  Negative for chest pain and palpitations.   Gastrointestinal:  Negative for abdominal pain, blood in stool, constipation, diarrhea, nausea and vomiting.   Genitourinary:  Negative for dysuria, flank pain and hematuria.   Musculoskeletal:  Positive for arthralgias (Sometimes he gets pain in the knees but not bad and does not take any medication for knee pain) and neck pain. Negative for myalgias.   Skin:  Negative for color change and rash.   Neurological:  Negative for dizziness, speech difficulty, weakness, numbness and headaches.   Hematological:  Does not bruise/bleed easily.   Psychiatric/Behavioral:  Negative for agitation and behavioral problems.            Objective:  Vitals:    03/04/24 0857   BP: 130/76   BP Location: Left arm   Patient Position: Sitting   Cuff Size: Standard   Pulse: 86   Resp: 18   Temp: 97.8 °F (36.6 °C)   TempSrc: Temporal   SpO2: 99%   Weight: 73.5 kg (162 lb)   Height: 5' 2\" (1.575 m)     Body mass index is 29.63 kg/m².     Physical Exam  Vitals and nursing note reviewed.   Constitutional:       General: She is not in acute distress.     Appearance: Normal appearance.   HENT:      Head: Normocephalic and atraumatic.      Right Ear: Ear canal and external ear normal.      Left Ear: Ear canal and external ear normal.      Nose: Nose normal.      Mouth/Throat:      Mouth: Mucous membranes are moist.   Eyes:      General: No scleral icterus.        Right eye: No discharge.         Left eye: No discharge.      Extraocular Movements: Extraocular movements intact.      " Conjunctiva/sclera: Conjunctivae normal.   Cardiovascular:      Rate and Rhythm: Normal rate and regular rhythm.      Pulses: Normal pulses.      Heart sounds: Murmur heard.   Pulmonary:      Effort: Pulmonary effort is normal. No respiratory distress.      Breath sounds: Normal breath sounds. No wheezing, rhonchi or rales.   Abdominal:      General: Bowel sounds are normal.      Palpations: Abdomen is soft.      Tenderness: There is no abdominal tenderness.   Musculoskeletal:         General: Normal range of motion.      Cervical back: Normal range of motion and neck supple. Tenderness (Has a mild tenderness on the cervical spine area.) present. No rigidity. No muscular tenderness.      Right lower leg: No edema.      Left lower leg: No edema.   Skin:     General: Skin is warm.      Findings: No rash.   Neurological:      General: No focal deficit present.      Mental Status: She is alert and oriented to person, place, and time.      Motor: No weakness.   Psychiatric:         Mood and Affect: Mood normal.         Behavior: Behavior normal.

## 2024-03-04 NOTE — ASSESSMENT & PLAN NOTE
Patient  was advised to decrease portion size.  Advised to decrease carb, sugar, cholesterol intake.  Advised to exercise 3-5 times per week as much as you can tolerate.  Advised to lose weight.

## 2024-03-04 NOTE — PATIENT INSTRUCTIONS
Patient was advised to continue present medications. discussed with the patient medications and laboratory data in detail.  Follow-up with me in 3 months or as advised.  If any blood test was ordered please do 1 week prior to next appointment unless advise to get earlier.  If you have any questions please call the office [phone]

## 2024-04-03 ENCOUNTER — HOSPITAL ENCOUNTER (OUTPATIENT)
Dept: VASCULAR ULTRASOUND | Facility: HOSPITAL | Age: 89
Discharge: HOME | End: 2024-04-03
Payer: MEDICARE

## 2024-04-03 DIAGNOSIS — I65.23 CAROTID STENOSIS, ASYMPTOMATIC, BILATERAL: ICD-10-CM

## 2024-04-03 PROCEDURE — 93880 EXTRACRANIAL BILAT STUDY: CPT | Performed by: SURGERY

## 2024-04-03 PROCEDURE — 93880 EXTRACRANIAL BILAT STUDY: CPT

## 2024-05-30 ENCOUNTER — APPOINTMENT (OUTPATIENT)
Dept: LAB | Facility: HOSPITAL | Age: 89
End: 2024-05-30
Payer: MEDICARE

## 2024-05-30 DIAGNOSIS — E78.2 MIXED HYPERLIPIDEMIA: ICD-10-CM

## 2024-05-30 DIAGNOSIS — R73.9 HYPERGLYCEMIA: ICD-10-CM

## 2024-05-30 DIAGNOSIS — Z79.899 HIGH RISK MEDICATION USE: ICD-10-CM

## 2024-05-30 DIAGNOSIS — E03.9 ACQUIRED HYPOTHYROIDISM: ICD-10-CM

## 2024-05-30 DIAGNOSIS — I10 ESSENTIAL HYPERTENSION: ICD-10-CM

## 2024-05-30 LAB
ALBUMIN SERPL BCP-MCNC: 4.3 G/DL (ref 3.5–5)
ALP SERPL-CCNC: 64 U/L (ref 34–104)
ALT SERPL W P-5'-P-CCNC: 14 U/L (ref 7–52)
ANION GAP SERPL CALCULATED.3IONS-SCNC: 9 MMOL/L (ref 4–13)
AST SERPL W P-5'-P-CCNC: 19 U/L (ref 13–39)
BILIRUB SERPL-MCNC: 0.75 MG/DL (ref 0.2–1)
BUN SERPL-MCNC: 17 MG/DL (ref 5–25)
CALCIUM SERPL-MCNC: 10.1 MG/DL (ref 8.4–10.2)
CHLORIDE SERPL-SCNC: 102 MMOL/L (ref 96–108)
CHOLEST SERPL-MCNC: 166 MG/DL
CO2 SERPL-SCNC: 24 MMOL/L (ref 21–32)
CREAT SERPL-MCNC: 0.94 MG/DL (ref 0.6–1.3)
EST. AVERAGE GLUCOSE BLD GHB EST-MCNC: 117 MG/DL
GFR SERPL CREATININE-BSD FRML MDRD: 53 ML/MIN/1.73SQ M
GLUCOSE P FAST SERPL-MCNC: 99 MG/DL (ref 65–99)
HBA1C MFR BLD: 5.7 %
HDLC SERPL-MCNC: 63 MG/DL
LDLC SERPL CALC-MCNC: 86 MG/DL (ref 0–100)
NONHDLC SERPL-MCNC: 103 MG/DL
POTASSIUM SERPL-SCNC: 4.7 MMOL/L (ref 3.5–5.3)
PROT SERPL-MCNC: 7.5 G/DL (ref 6.4–8.4)
SODIUM SERPL-SCNC: 135 MMOL/L (ref 135–147)
TRIGL SERPL-MCNC: 86 MG/DL
TSH SERPL DL<=0.05 MIU/L-ACNC: 2.34 UIU/ML (ref 0.45–4.5)

## 2024-05-30 PROCEDURE — 80053 COMPREHEN METABOLIC PANEL: CPT

## 2024-05-30 PROCEDURE — 36415 COLL VENOUS BLD VENIPUNCTURE: CPT

## 2024-05-30 PROCEDURE — 80061 LIPID PANEL: CPT

## 2024-05-30 PROCEDURE — 83036 HEMOGLOBIN GLYCOSYLATED A1C: CPT

## 2024-05-30 PROCEDURE — 84443 ASSAY THYROID STIM HORMONE: CPT

## 2024-06-03 ENCOUNTER — OFFICE VISIT (OUTPATIENT)
Dept: INTERNAL MEDICINE CLINIC | Facility: CLINIC | Age: 89
End: 2024-06-03
Payer: MEDICARE

## 2024-06-03 VITALS
RESPIRATION RATE: 18 BRPM | WEIGHT: 159 LBS | TEMPERATURE: 97.5 F | OXYGEN SATURATION: 99 % | DIASTOLIC BLOOD PRESSURE: 70 MMHG | HEART RATE: 81 BPM | HEIGHT: 62 IN | SYSTOLIC BLOOD PRESSURE: 136 MMHG | BODY MASS INDEX: 29.26 KG/M2

## 2024-06-03 DIAGNOSIS — I10 ESSENTIAL HYPERTENSION: Primary | ICD-10-CM

## 2024-06-03 DIAGNOSIS — M15.9 PRIMARY OSTEOARTHRITIS INVOLVING MULTIPLE JOINTS: ICD-10-CM

## 2024-06-03 DIAGNOSIS — N18.31 STAGE 3A CHRONIC KIDNEY DISEASE (HCC): ICD-10-CM

## 2024-06-03 DIAGNOSIS — M54.2 NECK PAIN: ICD-10-CM

## 2024-06-03 DIAGNOSIS — K21.9 GASTROESOPHAGEAL REFLUX DISEASE WITHOUT ESOPHAGITIS: ICD-10-CM

## 2024-06-03 DIAGNOSIS — E78.2 MIXED HYPERLIPIDEMIA: ICD-10-CM

## 2024-06-03 DIAGNOSIS — R73.9 HYPERGLYCEMIA: ICD-10-CM

## 2024-06-03 DIAGNOSIS — I35.0 AORTIC VALVE STENOSIS, ETIOLOGY OF CARDIAC VALVE DISEASE UNSPECIFIED: ICD-10-CM

## 2024-06-03 DIAGNOSIS — I65.23 CAROTID STENOSIS, ASYMPTOMATIC, BILATERAL: ICD-10-CM

## 2024-06-03 DIAGNOSIS — E03.9 ACQUIRED HYPOTHYROIDISM: ICD-10-CM

## 2024-06-03 PROCEDURE — 99214 OFFICE O/P EST MOD 30 MIN: CPT | Performed by: INTERNAL MEDICINE

## 2024-06-03 PROCEDURE — G2211 COMPLEX E/M VISIT ADD ON: HCPCS | Performed by: INTERNAL MEDICINE

## 2024-06-03 NOTE — PROGRESS NOTES
Assessment/Plan:    1. Essential hypertension  2. Carotid stenosis, asymptomatic, bilateral  3. Aortic valve stenosis, etiology of cardiac valve disease unspecified  4. Acquired hypothyroidism  5. Primary osteoarthritis involving multiple joints  6. BMI 29.0-29.9,adult  7. Mixed hyperlipidemia  8. Hyperglycemia  9. Stage 3a chronic kidney disease (HCC)  10. Neck pain  11. Gastroesophageal reflux disease without esophagitis     Discussion/summary/plan:    Blood pressure well-controlled advised to continue present medications and low-salt diet.  For carotid artery stenosis he had a Doppler test.  2024 has more than 70% stenosis left side and less than 50% right side.  Has been seen and followed by vascular surgery.  Presently asymptomatic.  On statin and aspirin therapy.  For aortic valve stenosis she has been seen and followed by cardiologist.  Her TSH therapeutic 2.3 advised to continue same dose of levothyroxine.  Lipidemia well-controlled cholesterol 160, triglycerides 86, HDL 63 LDL 86 advised to continue pravastatin and low-cholesterol low carbs diet.  She gets pain in the joints mainly shoulder and knees most likely osteoarthritis takes acetaminophen as needed for pain.  She worked outside in the yard over the weekend developed some neck pain without radicular symptoms most likely acute muscular sprain and spasm with osteoarthritis.  Has been taking acetaminophen.  Advised to use a heating pad or Bengay topically.  Will avoid any muscle relaxant due to age.  Her hemoglobin A1c 5.7 advised to watch diet for sugar and carbs intake.  CKD stable advised to maintain hydration.  For GE reflux he takes famotidine as needed which is effective.  She has been watching her diet.  Advised to exercise as much as she can and try to watch calorie intake and try to lose weight.  Patient does not want to see anybody for her pain in the knees at present.    Subjective: Patient presents for follow-up.      Patient ID: Delaney OLMEDO  Ignacio is a 89 y.o. female.    HPI  89-year-old white female patient present for follow-up of her medical problems.  She denies any chest pain, shortness of breath, pain in abdomen.  Denies any cough, fever, chills.  Denies any nausea vomiting diarrhea.  Complain of neck pain after she worked in the yard and she cut bushes over the weekend.  Denies any fall or injury.  Denies any pain radiating in the arms.    The following portions of the patient's history were reviewed and updated as appropriate:     Past Medical History:  She has a past medical history of Allergic rhinitis, Aortic stenosis, moderate, Bleeding per rectum, Blood glucose elevated, BMI 26.0-26.9,adult (11/08/2022), BMI 29.0-29.9,adult (10/19/2022), BMI 30.0-30.9,adult (10/14/2021), BMI 32.0-32.9,adult (02/24/2021), Carotid arterial disease (HCC), Colonoscopy refused, Conjunctivitis, allergic, Constipation, COVID-19 (12/13/2023), Disease of thyroid gland, Elbow fracture, Encounter for support and coordination of transition of care (11/08/2022), Excessive cerumen in right ear canal (11/08/2022), GERD (gastroesophageal reflux disease), Herpes zoster, Hyperlipidemia, Hypertension, Medicare annual wellness visit, subsequent (10/14/2021), Medicare annual wellness visit, subsequent (10/19/2022), Medicare annual wellness visit, subsequent (10/31/2023), Neck pain (04/24/2023), Osteoarthritis of multiple joints (01/14/2022), Other constipation (11/08/2022), Other specified anemias (11/08/2022), Pacemaker, Post herpetic neuralgia, Skin rash (05/26/2021), Stage 3a chronic kidney disease (HCC) (01/14/2022), and Vomiting (11/08/2022).,  _______________________________________________________________________  Past Surgical History:   has a past surgical history that includes Joint replacement; Tubal ligation; Cardiac pacemaker placement; and Total hip arthroplasty (Left, 02/2015).,  _______________________________________________________________________  Family  History:  family history includes Cancer in her father; Heart disease in her mother.,  _______________________________________________________________________  Social History:   reports that she has never smoked. She has never used smokeless tobacco. She reports that she does not drink alcohol and does not use drugs.,  _______________________________________________________________________  Allergies:  is allergic to ampicillin, codeine, and penicillins..  _______________________________________________________________________  Current Outpatient Medications   Medication Sig Dispense Refill   • acetaminophen (TYLENOL) 500 mg tablet Take 500 mg by mouth 2 (two) times a day as needed     • amLODIPine (NORVASC) 5 mg tablet Take 1 tablet (5 mg total) by mouth daily at bedtime  0   • aspirin 81 mg chewable tablet Chew 81 mg daily     • carvedilol (COREG) 12.5 mg tablet Take 1 tablet (12.5 mg total) by mouth 2 (two) times a day with meals 60 tablet 0   • enalapril (VASOTEC) 20 mg tablet Take 20 mg by mouth 2 (two) times a day     • ezetimibe (ZETIA) 10 mg tablet Take 10 mg by mouth daily     • famotidine (PEPCID) 20 mg tablet Take 20 mg by mouth as needed     • latanoprost (XALATAN) 0.005 % ophthalmic solution INSTILL 1 DROP IN EACH EYE AT BEDTIME     • levothyroxine 50 mcg tablet TAKE 1 TABLET BY MOUTH EVERY DAY 30 tablet 5   • pravastatin (PRAVACHOL) 40 mg tablet Take 40 mg by mouth daily at bedtime Mon,Friday x2, one ever other day x1       No current facility-administered medications for this visit.     _______________________________________________________________________  Review of Systems   Constitutional:  Negative for chills and fever.   HENT:  Negative for congestion, ear pain, hearing loss, nosebleeds, sinus pain, sore throat and trouble swallowing.    Eyes:  Negative for discharge, redness and visual disturbance.   Respiratory:  Negative for cough, chest tightness and shortness of breath.    Cardiovascular:   "Negative for chest pain and palpitations.   Gastrointestinal:  Negative for abdominal pain, blood in stool, constipation, diarrhea, nausea and vomiting.   Genitourinary:  Negative for dysuria, flank pain and hematuria.   Musculoskeletal:  Positive for arthralgias and neck pain. Negative for myalgias.   Skin:  Negative for color change and rash.   Neurological:  Negative for dizziness, speech difficulty, weakness and headaches.   Hematological:  Does not bruise/bleed easily.   Psychiatric/Behavioral:  Negative for agitation and behavioral problems.            Objective:  Vitals:    06/03/24 0900   BP: 136/70   BP Location: Left arm   Patient Position: Sitting   Cuff Size: Standard   Pulse: 81   Resp: 18   Temp: 97.5 °F (36.4 °C)   TempSrc: Temporal   SpO2: 99%   Weight: 72.1 kg (159 lb)   Height: 5' 2\" (1.575 m)     Body mass index is 29.08 kg/m².     Physical Exam  Vitals and nursing note reviewed.   Constitutional:       General: She is not in acute distress.     Appearance: Normal appearance.   HENT:      Head: Normocephalic and atraumatic.      Nose: Nose normal.      Mouth/Throat:      Mouth: Mucous membranes are moist.   Eyes:      General: No scleral icterus.        Right eye: No discharge.         Left eye: No discharge.      Extraocular Movements: Extraocular movements intact.      Conjunctiva/sclera: Conjunctivae normal.   Cardiovascular:      Rate and Rhythm: Normal rate and regular rhythm.      Pulses: Normal pulses.      Heart sounds: Murmur heard.   Pulmonary:      Effort: Pulmonary effort is normal. No respiratory distress.      Breath sounds: Normal breath sounds. No wheezing.   Abdominal:      General: Bowel sounds are normal.      Palpations: Abdomen is soft.      Tenderness: There is no abdominal tenderness.   Musculoskeletal:         General: Tenderness (Has some pain in the knees and shoulders on flexion extension and abduction and adduction.) present. Normal range of motion.      Cervical back: " Neck supple. Tenderness (She has a tenderness on the surgical spinal paraspinally.  Has a slightly decreased range of motion.) present. No muscular tenderness.      Right lower leg: No edema.      Left lower leg: No edema.   Skin:     General: Skin is warm.   Neurological:      General: No focal deficit present.      Mental Status: She is alert and oriented to person, place, and time.      Motor: No weakness.   Psychiatric:         Mood and Affect: Mood normal.         Behavior: Behavior normal.

## 2024-06-03 NOTE — PATIENT INSTRUCTIONS
Patient was advised to continue present medications. discussed with the patient medications and laboratory data in detail.  Follow-up with me in 3 months or as advised.  If any blood test was ordered please do 1 week prior to next appointment unless advise to get earlier.  If you have any questions please call the office [phone]

## 2024-06-14 DIAGNOSIS — E03.9 ACQUIRED HYPOTHYROIDISM: ICD-10-CM

## 2024-06-14 RX ORDER — LEVOTHYROXINE SODIUM 0.05 MG/1
TABLET ORAL
Qty: 90 TABLET | Refills: 1 | Status: SHIPPED | OUTPATIENT
Start: 2024-06-14

## 2024-07-17 ENCOUNTER — OFFICE VISIT (OUTPATIENT)
Dept: VASCULAR SURGERY | Facility: CLINIC | Age: 89
End: 2024-07-17
Payer: MEDICARE

## 2024-07-17 VITALS
SYSTOLIC BLOOD PRESSURE: 142 MMHG | HEIGHT: 62 IN | OXYGEN SATURATION: 99 % | BODY MASS INDEX: 29.96 KG/M2 | HEART RATE: 93 BPM | DIASTOLIC BLOOD PRESSURE: 88 MMHG | WEIGHT: 162.8 LBS | RESPIRATION RATE: 18 BRPM

## 2024-07-17 DIAGNOSIS — I65.23 CAROTID STENOSIS, ASYMPTOMATIC, BILATERAL: Primary | ICD-10-CM

## 2024-07-17 PROCEDURE — 99213 OFFICE O/P EST LOW 20 MIN: CPT | Performed by: NURSE PRACTITIONER

## 2024-07-17 NOTE — PROGRESS NOTES
Assessment/Plan:     89-year-old female with HTN, HLD, GERD, hypothyroid, arthritis, AS, s/p PPM, and asymptomatic bilateral carotid stenosis L>R presents to review imaging and yearly [facility] visit.    Problem List Items Addressed This Visit       Carotid stenosis, asymptomatic, bilateral - Primary     - Patient with known bilateral ICA stenosis L>R.  In review of chart essentially unchanged dating back to 2016.   -Carotid duplex 4/3/2024  R 50 to 69% ICA stenosis  L 70+% ICA stenosis 243/55 ratio 3.45    Plan:  -Continue every 6 month surveillance (due October 2024)  -Continue daily aspirin  -Continue daily statin  -Reviewed TIA and strokelike symptoms and when to seek medical attention.         Relevant Orders    VAS carotid complete study          Diagnoses and all orders for this visit:    Carotid stenosis, asymptomatic, bilateral  -     VAS carotid complete study; Future          Subjective:     Patient ID: Delaney Pierre is a 89 y.o. female.  Patient here for RR of CV had one done 04/03/24. Patient denies any TIA symptoms    HPI  See assessment and plan    89-year-old active female presents to review carotid duplex imaging and yearly [facility] visit.  She presents without complaints.  Denies TIA or strokelike symptoms.  Neuroexam is intact.    Reviewed carotid duplex imaging without significant change or progression of disease.  Known bilateral disease with 70% stenosis on the left and 50-69% on the right.  In review of chart and imaging this is essentially unchanged since 2016.    No additional vascular imaging or intervention indicated this time.  Will continue with every 6 month surveillance and OMT.  She is maintained on daily aspirin and statin  Educated on TIA and strokelike symptoms when to seek medical attention.        Review of Systems   Constitutional: Negative.    HENT: Negative.     Eyes: Negative.  Negative for visual disturbance.   Respiratory:  Positive for shortness of breath.    Cardiovascular:  Negative.    Gastrointestinal: Negative.    Endocrine: Negative.    Genitourinary: Negative.    Musculoskeletal:  Positive for arthralgias and joint swelling.   Skin: Negative.    Allergic/Immunologic: Negative.    Neurological: Negative.    Hematological:  Bruises/bleeds easily.   Psychiatric/Behavioral: Negative.         I have reviewed and made appropriate changes to the review of systems input by the medical assistant.    Objective:     Physical Exam  Vitals reviewed.   Constitutional:       General: She is not in acute distress.     Appearance: Normal appearance.   HENT:      Head: Normocephalic and atraumatic.   Neck:      Vascular: Carotid bruit present.   Cardiovascular:      Rate and Rhythm: Normal rate and regular rhythm.      Pulses:           Carotid pulses are 2+ on the right side and 2+ on the left side with bruit.       Radial pulses are 2+ on the right side and 2+ on the left side.      Heart sounds: Murmur heard.   Pulmonary:      Effort: Pulmonary effort is normal. No respiratory distress.      Breath sounds: Normal breath sounds.   Musculoskeletal:         General: Normal range of motion.      Cervical back: Normal range of motion and neck supple.   Skin:     General: Skin is warm.      Capillary Refill: Capillary refill takes less than 2 seconds.      Findings: Bruising present.      Comments: Ecchymosis to BUE  Thin, fragile skin   Neurological:      General: No focal deficit present.      Mental Status: She is alert and oriented to person, place, and time.      Cranial Nerves: No cranial nerve deficit.      Sensory: No sensory deficit.      Motor: No weakness.   Psychiatric:         Behavior: Behavior normal.         I have spent a total time of 22 minutes in caring for this patient on the day of the visit/encounter including Diagnostic results, Instructions for management, Patient and family education, Importance of tx compliance, Impressions, Documenting in the medical record, Reviewing /  "ordering tests, medicine, procedures  , and Obtaining or reviewing history  .      Vitals:    07/17/24 0834 07/17/24 0835   BP: 136/74 142/88   BP Location: Right arm Left arm   Patient Position: Sitting Sitting   Cuff Size: Standard Standard   Pulse: 93    Resp:  18   SpO2: 99%    Weight: 73.8 kg (162 lb 12.8 oz)    Height: 5' 2\" (1.575 m)        Patient Active Problem List   Diagnosis    Spiral fracture of shaft of humerus    SDH (subdural hematoma) ([facility])    Aortic valve stenosis    Essential hypertension    Carotid stenosis, asymptomatic, bilateral    Gastroesophageal reflux disease without esophagitis    Acquired hypothyroidism    Primary osteoarthritis involving multiple joints    Mixed hyperlipidemia    History of permanent cardiac pacemaker placement    Hyperglycemia    BMI 32.0-32.9,adult    BMI 31.0-31.9,adult    Skin rash    BMI 30.0-30.9,adult    Stage 3a chronic kidney disease (HCC)    Osteoarthritis of multiple joints    BMI 29.0-29.9,adult    Intractable nausea and vomiting    Dizziness    Encounter for support and coordination of transition of care    Other specified anemias    Other constipation    BMI 26.0-26.9,adult    Vomiting    Neck pain    COVID-19       Past Surgical History:   Procedure Laterality Date    CARDIAC PACEMAKER PLACEMENT      ppm    JOINT REPLACEMENT      both hips    TOTAL HIP ARTHROPLASTY Left 02/2015    TUBAL LIGATION         Family History   Problem Relation Age of Onset    Heart disease Mother     Cancer Father         colon       Social History     Socioeconomic History    Marital status:      Spouse name: Not on file    Number of children: Not on file    Years of education: Not on file    Highest education level: Not on file   Occupational History    Occupation: Retired    Tobacco Use    Smoking status: Never    Smokeless tobacco: Never   Vaping Use    Vaping status: Never Used   Substance and Sexual Activity    Alcohol use: Never    Drug use: No    " Sexual activity: Not Currently   Other Topics Concern    Not on file   Social History Narrative    Lives alone    Mammogram: Advised, pt refused    Colonoscopy: advised, pt refused    Annual eye exam: sees q2y ophth    Bone density study: Advised, pt refused    Annual dental checkup: Sees dentist as needed    - As per [employer] [facility]      Social Determinants of Health     Financial Resource Strain: Low Risk  (10/31/2023)    Overall Financial Resource Strain (CARDIA)     Difficulty of Paying Living Expenses: Not hard at all   Food Insecurity: No Food Insecurity (11/3/2022)    Hunger Vital Sign     Worried About Running Out of Food in the Last Year: Never true     Ran Out of Food in the Last Year: Never true   Transportation Needs: No Transportation Needs (10/31/2023)    PRAPARE - Transportation     Lack of Transportation (Medical): No     Lack of Transportation (Non-Medical): No   Physical Activity: Not on file   Stress: Not on file   Social Connections: Not on file   Intimate Partner Violence: Not on file   Housing Stability: Unknown (11/3/2022)    Housing Stability Vital Sign     Unable to Pay for Housing in the Last Year: No     Number of Places Lived in the Last Year: Not on file     Unstable Housing in the Last Year: No       Allergies   Allergen Reactions    Ampicillin     Codeine GI Intolerance     Light headed    Penicillins GI Intolerance         Current Outpatient Medications:     acetaminophen (TYLENOL) 500 mg tablet, Take 500 mg by mouth 2 (two) times a day as needed, Disp: , Rfl:     amLODIPine (NORVASC) 5 mg tablet, Take 1 tablet (5 mg total) by mouth daily at bedtime, Disp: , Rfl: 0    aspirin 81 mg chewable tablet, Chew 81 mg daily, Disp: , Rfl:     carvedilol (COREG) 12.5 mg tablet, Take 1 tablet (12.5 mg total) by mouth 2 (two) times a day with meals, Disp: 60 tablet, Rfl: 0    enalapril (VASOTEC) 20 mg tablet, Take 20 mg by mouth 2 (two) times a day, Disp: , Rfl:     ezetimibe (ZETIA) 10 mg tablet,  Take 10 mg by mouth daily, Disp: , Rfl:     famotidine (PEPCID) 20 mg tablet, Take 20 mg by mouth as needed, Disp: , Rfl:     latanoprost (XALATAN) 0.005 % ophthalmic solution, INSTILL 1 DROP IN EACH EYE AT BEDTIME, Disp: , Rfl:     levothyroxine 50 mcg tablet, TAKE 1 TABLET BY MOUTH EVERY DAY, Disp: 90 tablet, Rfl: 1    pravastatin (PRAVACHOL) 40 mg tablet, Take 40 mg by mouth daily at bedtime Mon,Friday x2, one ever other day x1, Disp: , Rfl:

## 2024-07-17 NOTE — PATIENT INSTRUCTIONS
Duplex in 3 months to continue every 6 month surveillance   Continue daily baby aspirin  Continue daily statin  Return to office yearly for [facility]

## 2024-07-17 NOTE — ASSESSMENT & PLAN NOTE
- Patient with known bilateral ICA stenosis L>R.  In review of chart essentially unchanged dating back to 2016.   -Carotid duplex 4/3/2024  R 50 to 69% ICA stenosis  L 70+% ICA stenosis 243/55 ratio 3.45    Plan:  -Continue every 6 month surveillance (due October 2024)  -Continue daily aspirin  -Continue daily statin  -Reviewed TIA and strokelike symptoms and when to seek medical attention.

## 2024-08-28 ENCOUNTER — TELEPHONE (OUTPATIENT)
Dept: INTERNAL MEDICINE CLINIC | Facility: CLINIC | Age: 89
End: 2024-08-28

## 2024-08-28 NOTE — TELEPHONE ENCOUNTER
Hi, Dr Patel will not be in the office 9/3/24 through 9/6/24, therefore, we must reschedule your appointment.      Please call our office to reschedule at [phone] or you may choose to reschedule your office visit through [employer].    Matteo for any inconveniences.    Thank you,  [facility].

## 2024-09-12 ENCOUNTER — OFFICE VISIT (OUTPATIENT)
Dept: INTERNAL MEDICINE CLINIC | Facility: CLINIC | Age: 89
End: 2024-09-12
Payer: MEDICARE

## 2024-09-12 VITALS
HEIGHT: 62 IN | TEMPERATURE: 98.4 F | SYSTOLIC BLOOD PRESSURE: 142 MMHG | OXYGEN SATURATION: 98 % | RESPIRATION RATE: 18 BRPM | DIASTOLIC BLOOD PRESSURE: 80 MMHG | BODY MASS INDEX: 29.44 KG/M2 | HEART RATE: 80 BPM | WEIGHT: 160 LBS

## 2024-09-12 DIAGNOSIS — Z23 NEED FOR PROPHYLACTIC VACCINATION AND INOCULATION AGAINST INFLUENZA: ICD-10-CM

## 2024-09-12 DIAGNOSIS — E78.2 MIXED HYPERLIPIDEMIA: ICD-10-CM

## 2024-09-12 DIAGNOSIS — E03.9 ACQUIRED HYPOTHYROIDISM: ICD-10-CM

## 2024-09-12 DIAGNOSIS — R73.03 PREDIABETES: ICD-10-CM

## 2024-09-12 DIAGNOSIS — K21.9 GASTROESOPHAGEAL REFLUX DISEASE WITHOUT ESOPHAGITIS: ICD-10-CM

## 2024-09-12 DIAGNOSIS — Z79.899 HIGH RISK MEDICATION USE: ICD-10-CM

## 2024-09-12 DIAGNOSIS — I10 ESSENTIAL HYPERTENSION: Primary | ICD-10-CM

## 2024-09-12 DIAGNOSIS — I65.23 CAROTID STENOSIS, ASYMPTOMATIC, BILATERAL: ICD-10-CM

## 2024-09-12 DIAGNOSIS — J00 ACUTE RHINITIS: ICD-10-CM

## 2024-09-12 DIAGNOSIS — M15.9 PRIMARY OSTEOARTHRITIS INVOLVING MULTIPLE JOINTS: ICD-10-CM

## 2024-09-12 DIAGNOSIS — I35.0 AORTIC VALVE STENOSIS, ETIOLOGY OF CARDIAC VALVE DISEASE UNSPECIFIED: ICD-10-CM

## 2024-09-12 PROCEDURE — 99214 OFFICE O/P EST MOD 30 MIN: CPT | Performed by: INTERNAL MEDICINE

## 2024-09-12 PROCEDURE — G0008 ADMIN INFLUENZA VIRUS VAC: HCPCS | Performed by: INTERNAL MEDICINE

## 2024-09-12 PROCEDURE — 90662 IIV NO PRSV INCREASED AG IM: CPT | Performed by: INTERNAL MEDICINE

## 2024-09-12 NOTE — PATIENT INSTRUCTIONS
Patient was advised to continue present medications. discussed with the patient medications and laboratory data in detail.  Follow-up with me in 3 months or as advised.  If any blood test was ordered please do 1 week prior to next appointment unless advise to get earlier.  If you have any questions please call the office [phone]

## 2024-09-12 NOTE — ASSESSMENT & PLAN NOTE
Patient  was advised to decrease portion size.  Advised to decrease carb, sugar, cholesterol intake.  Advised to exercise 3-5 times per week as much as you can.  Advised to lose weight.

## 2024-09-12 NOTE — PROGRESS NOTES
Assessment/Plan:    1. Essential hypertension  -     CBC and differential; Future  -     Comprehensive metabolic panel; Future  2. Aortic valve stenosis, etiology of cardiac valve disease unspecified  3. Carotid stenosis, asymptomatic, bilateral  4. Gastroesophageal reflux disease without esophagitis  -     CBC and differential; Future  5. Acquired hypothyroidism  -     TSH, 3rd generation; Future  6. BMI 29.0-29.9,adult  Assessment & Plan:  Patient  was advised to decrease portion size.  Advised to decrease carb, sugar, cholesterol intake.  Advised to exercise 3-5 times per week as much as you can.  Advised to lose weight.  7. Mixed hyperlipidemia  -     Lipid panel; Future  -     Comprehensive metabolic panel; Future  8. Prediabetes  -     Comprehensive metabolic panel; Future  -     Hemoglobin A1C; Future  9. High risk medication use  -     Comprehensive metabolic panel; Future  10. Primary osteoarthritis involving multiple joints  11. Need for prophylactic vaccination and inoculation against influenza  -     influenza vaccine, high-dose, PF 0.7 mL (FLUZONE HIGH-DOSE)  12. Acute rhinitis     Discussion/summary/plan:    Systolic blood pressure somewhat elevated.  On 3 different blood pressure medications.  Patient states sometimes she gets nervous when she goes to see doctors.  For now we will observe on present medications and advised for low-salt diet.  For aortic stenosis she has been seen and followed by cardiology patient states she had echocardiogram about a month ago and has a follow-up appointment to see his cardiologist in few days.  GFR stable and controlled on famotidine.  Last TSH therapeutic advised to continue same dose will follow TSH.  Last cholesterol 166, triglyceride 86, HDL 60, LDL 86 on pravastatin advised to continue present medication low-cholesterol diet.  Last hemoglobin A1c 5.7 advised to watch diet for sugar and carbs intake will follow blood sugar hemoglobin A1c.  She gets pain in the  joints of the hands and knees and his neck most likely osteoarthritis for which she takes acetaminophen as needed.  For rhinitis she was advised to take over-the-counter loratadine but patient states it is not too bad and she prefer not to take more medications.    Subjective: Patient presents for follow-up.      Patient ID: Delaney Pierre is a 89 y.o. female.    HPI  89-year-old white female patient presents for follow-up of her medical problems.    The following portions of the patient's history were reviewed and updated as appropriate:     Past Medical History:  She has a past medical history of Acute rhinitis (09/12/2024), Allergic rhinitis, Aortic stenosis, moderate, Bleeding per rectum, Blood glucose elevated, BMI 26.0-26.9,adult (11/08/2022), BMI 29.0-29.9,adult (10/19/2022), BMI 30.0-30.9,adult (10/14/2021), BMI 32.0-32.9,adult (02/24/2021), Carotid arterial disease (HCC), Colonoscopy refused, Conjunctivitis, allergic, Constipation, COVID-19 (12/13/2023), Disease of thyroid gland, Elbow fracture, Encounter for support and coordination of transition of care (11/08/2022), Excessive cerumen in right ear canal (11/08/2022), GERD (gastroesophageal reflux disease), Herpes zoster, Hyperlipidemia, Hypertension, Medicare annual wellness visit, subsequent (10/14/2021), Medicare annual wellness visit, subsequent (10/19/2022), Medicare annual wellness visit, subsequent (10/31/2023), Neck pain (04/24/2023), Osteoarthritis of multiple joints (01/14/2022), Other constipation (11/08/2022), Other specified anemias (11/08/2022), Pacemaker, Post herpetic neuralgia, Prediabetes (09/12/2024), Skin rash (05/26/2021), Stage 3a chronic kidney disease (HCC) (01/14/2022), and Vomiting (11/08/2022).,  _______________________________________________________________________  Past Surgical History:   has a past surgical history that includes Joint replacement; Tubal ligation; Cardiac pacemaker placement; and Total hip arthroplasty (Left,  02/2015).,  _______________________________________________________________________  Family History:  family history includes Cancer in her father; Heart disease in her mother.,  _______________________________________________________________________  Social History:   reports that she has never smoked. She has never used smokeless tobacco. She reports that she does not drink alcohol and does not use drugs.,  _______________________________________________________________________  Allergies:  is allergic to ampicillin, codeine, and penicillins..  _______________________________________________________________________  Current Outpatient Medications   Medication Sig Dispense Refill    acetaminophen (TYLENOL) 500 mg tablet Take 500 mg by mouth 2 (two) times a day as needed      amLODIPine (NORVASC) 5 mg tablet Take 1 tablet (5 mg total) by mouth daily at bedtime  0    aspirin 81 mg chewable tablet Chew 81 mg daily      carvedilol (COREG) 12.5 mg tablet Take 1 tablet (12.5 mg total) by mouth 2 (two) times a day with meals 60 tablet 0    enalapril (VASOTEC) 20 mg tablet Take 20 mg by mouth 2 (two) times a day      ezetimibe (ZETIA) 10 mg tablet Take 10 mg by mouth daily      famotidine (PEPCID) 20 mg tablet Take 20 mg by mouth as needed      latanoprost (XALATAN) 0.005 % ophthalmic solution INSTILL 1 DROP IN EACH EYE AT BEDTIME      levothyroxine 50 mcg tablet TAKE 1 TABLET BY MOUTH EVERY DAY 90 tablet 1    pravastatin (PRAVACHOL) 40 mg tablet Take 40 mg by mouth 2 (two) times a day Mon,Friday x2, one ever other day x1       No current facility-administered medications for this visit.     _______________________________________________________________________  Review of Systems   Constitutional:  Negative for chills and fever.   HENT:  Positive for rhinorrhea (Sometimes she gets a runny nose allergy symptoms.). Negative for congestion, ear pain, nosebleeds, sinus pain, sore throat and trouble swallowing.    Eyes:   "Negative for discharge, redness and visual disturbance.   Respiratory:  Negative for cough, chest tightness and shortness of breath.    Cardiovascular:  Negative for chest pain and palpitations.   Gastrointestinal:  Negative for abdominal pain, blood in stool, constipation, diarrhea, nausea and vomiting.   Genitourinary:  Negative for dysuria, flank pain and hematuria.   Musculoskeletal:  Positive for arthralgias. Negative for myalgias and neck pain.   Skin:  Negative for color change and rash.   Neurological:  Negative for dizziness, speech difficulty, weakness and headaches.   Hematological:  Does not bruise/bleed easily.   Psychiatric/Behavioral:  Negative for agitation and behavioral problems.          Objective:  Vitals:    09/12/24 1025   BP: 142/80   BP Location: Left arm   Patient Position: Sitting   Cuff Size: Standard   Pulse: 80   Resp: 18   Temp: 98.4 °F (36.9 °C)   TempSrc: Temporal   SpO2: 98%   Weight: 72.6 kg (160 lb)   Height: 5' 2\" (1.575 m)     Body mass index is 29.26 kg/m².     Physical Exam  Vitals and nursing note reviewed.   Constitutional:       General: She is not in acute distress.     Appearance: Normal appearance.   HENT:      Head: Normocephalic and atraumatic.      Right Ear: External ear normal.      Left Ear: External ear normal.      Nose: Nose normal.      Mouth/Throat:      Mouth: Mucous membranes are moist.   Eyes:      General: No scleral icterus.        Right eye: No discharge.         Left eye: No discharge.      Extraocular Movements: Extraocular movements intact.      Conjunctiva/sclera: Conjunctivae normal.   Cardiovascular:      Rate and Rhythm: Normal rate and regular rhythm.      Pulses: Normal pulses.      Heart sounds: Murmur heard.   Pulmonary:      Effort: Pulmonary effort is normal. No respiratory distress.      Breath sounds: Normal breath sounds.   Abdominal:      General: Bowel sounds are normal.      Palpations: Abdomen is soft.      Tenderness: There is no " abdominal tenderness.   Musculoskeletal:         General: Tenderness (She has some mild tenderness of the joints of the fingers has a DJD changes of the joints of the fingers.) present. Normal range of motion.      Cervical back: Normal range of motion and neck supple. No muscular tenderness.      Right lower leg: No edema.      Left lower leg: No edema.   Skin:     General: Skin is warm.   Neurological:      General: No focal deficit present.      Mental Status: She is alert and oriented to person, place, and time.   Psychiatric:         Mood and Affect: Mood normal.         Behavior: Behavior normal.

## 2024-10-28 ENCOUNTER — HOSPITAL ENCOUNTER (OUTPATIENT)
Dept: VASCULAR ULTRASOUND | Facility: HOSPITAL | Age: 89
Discharge: HOME | End: 2024-10-28
Payer: MEDICARE

## 2024-10-28 DIAGNOSIS — I65.23 CAROTID STENOSIS, ASYMPTOMATIC, BILATERAL: ICD-10-CM

## 2024-10-28 PROCEDURE — 93880 EXTRACRANIAL BILAT STUDY: CPT | Performed by: SURGERY

## 2024-10-28 PROCEDURE — 93880 EXTRACRANIAL BILAT STUDY: CPT

## 2024-10-29 ENCOUNTER — TRANSCRIBE ORDERS (OUTPATIENT)
Dept: VASCULAR SURGERY | Facility: CLINIC | Age: 89
End: 2024-10-29

## 2024-10-29 DIAGNOSIS — I65.23 CAROTID STENOSIS, ASYMPTOMATIC, BILATERAL: Primary | ICD-10-CM

## 2024-12-08 DIAGNOSIS — E03.9 ACQUIRED HYPOTHYROIDISM: ICD-10-CM

## 2024-12-09 RX ORDER — LEVOTHYROXINE SODIUM 50 UG/1
50 TABLET ORAL DAILY
Qty: 90 TABLET | Refills: 1 | Status: SHIPPED | OUTPATIENT
Start: 2024-12-09

## 2024-12-14 ENCOUNTER — APPOINTMENT (OUTPATIENT)
Dept: LAB | Facility: HOSPITAL | Age: 89
End: 2024-12-14
Payer: MEDICARE

## 2024-12-14 DIAGNOSIS — E03.9 ACQUIRED HYPOTHYROIDISM: ICD-10-CM

## 2024-12-14 DIAGNOSIS — K21.9 GASTROESOPHAGEAL REFLUX DISEASE WITHOUT ESOPHAGITIS: ICD-10-CM

## 2024-12-14 DIAGNOSIS — E78.2 MIXED HYPERLIPIDEMIA: ICD-10-CM

## 2024-12-14 DIAGNOSIS — R73.03 PREDIABETES: ICD-10-CM

## 2024-12-14 DIAGNOSIS — Z79.899 HIGH RISK MEDICATION USE: ICD-10-CM

## 2024-12-14 DIAGNOSIS — I10 ESSENTIAL HYPERTENSION: ICD-10-CM

## 2024-12-14 LAB
ALBUMIN SERPL BCG-MCNC: 4.2 G/DL (ref 3.5–5)
ALP SERPL-CCNC: 62 U/L (ref 34–104)
ALT SERPL W P-5'-P-CCNC: 15 U/L (ref 7–52)
ANION GAP SERPL CALCULATED.3IONS-SCNC: 7 MMOL/L (ref 4–13)
AST SERPL W P-5'-P-CCNC: 18 U/L (ref 13–39)
BASOPHILS # BLD AUTO: 0.04 THOUSANDS/ÂΜL (ref 0–0.1)
BASOPHILS NFR BLD AUTO: 1 % (ref 0–1)
BILIRUB SERPL-MCNC: 0.51 MG/DL (ref 0.2–1)
BUN SERPL-MCNC: 16 MG/DL (ref 5–25)
CALCIUM SERPL-MCNC: 10 MG/DL (ref 8.4–10.2)
CHLORIDE SERPL-SCNC: 104 MMOL/L (ref 96–108)
CHOLEST SERPL-MCNC: 151 MG/DL (ref ?–200)
CO2 SERPL-SCNC: 26 MMOL/L (ref 21–32)
CREAT SERPL-MCNC: 0.93 MG/DL (ref 0.6–1.3)
EOSINOPHIL # BLD AUTO: 0.22 THOUSAND/ÂΜL (ref 0–0.61)
EOSINOPHIL NFR BLD AUTO: 4 % (ref 0–6)
ERYTHROCYTE [DISTWIDTH] IN BLOOD BY AUTOMATED COUNT: 12.2 % (ref 11.6–15.1)
EST. AVERAGE GLUCOSE BLD GHB EST-MCNC: 105 MG/DL
GFR SERPL CREATININE-BSD FRML MDRD: 53 ML/MIN/1.73SQ M
GLUCOSE P FAST SERPL-MCNC: 96 MG/DL (ref 65–99)
HBA1C MFR BLD: 5.3 %
HCT VFR BLD AUTO: 39.5 % (ref 34.8–46.1)
HDLC SERPL-MCNC: 60 MG/DL
HGB BLD-MCNC: 12.9 G/DL (ref 11.5–15.4)
IMM GRANULOCYTES # BLD AUTO: 0.04 THOUSAND/UL (ref 0–0.2)
IMM GRANULOCYTES NFR BLD AUTO: 1 % (ref 0–2)
LDLC SERPL CALC-MCNC: 74 MG/DL (ref 0–100)
LYMPHOCYTES # BLD AUTO: 1.14 THOUSANDS/ÂΜL (ref 0.6–4.47)
LYMPHOCYTES NFR BLD AUTO: 19 % (ref 14–44)
MCH RBC QN AUTO: 32 PG (ref 26.8–34.3)
MCHC RBC AUTO-ENTMCNC: 32.7 G/DL (ref 31.4–37.4)
MCV RBC AUTO: 98 FL (ref 82–98)
MONOCYTES # BLD AUTO: 0.51 THOUSAND/ÂΜL (ref 0.17–1.22)
MONOCYTES NFR BLD AUTO: 8 % (ref 4–12)
NEUTROPHILS # BLD AUTO: 4.14 THOUSANDS/ÂΜL (ref 1.85–7.62)
NEUTS SEG NFR BLD AUTO: 67 % (ref 43–75)
NONHDLC SERPL-MCNC: 91 MG/DL
NRBC BLD AUTO-RTO: 0 /100 WBCS
PLATELET # BLD AUTO: 210 THOUSANDS/UL (ref 149–390)
PMV BLD AUTO: 9.1 FL (ref 8.9–12.7)
POTASSIUM SERPL-SCNC: 4.4 MMOL/L (ref 3.5–5.3)
PROT SERPL-MCNC: 7.1 G/DL (ref 6.4–8.4)
RBC # BLD AUTO: 4.03 MILLION/UL (ref 3.81–5.12)
SODIUM SERPL-SCNC: 137 MMOL/L (ref 135–147)
TRIGL SERPL-MCNC: 84 MG/DL (ref ?–150)
TSH SERPL DL<=0.05 MIU/L-ACNC: 1.56 UIU/ML (ref 0.45–4.5)
WBC # BLD AUTO: 6.09 THOUSAND/UL (ref 4.31–10.16)

## 2024-12-14 PROCEDURE — 36415 COLL VENOUS BLD VENIPUNCTURE: CPT

## 2024-12-14 PROCEDURE — 84443 ASSAY THYROID STIM HORMONE: CPT

## 2024-12-14 PROCEDURE — 80061 LIPID PANEL: CPT

## 2024-12-14 PROCEDURE — 85025 COMPLETE CBC W/AUTO DIFF WBC: CPT

## 2024-12-14 PROCEDURE — 80053 COMPREHEN METABOLIC PANEL: CPT

## 2024-12-14 PROCEDURE — 83036 HEMOGLOBIN GLYCOSYLATED A1C: CPT

## 2024-12-16 ENCOUNTER — OFFICE VISIT (OUTPATIENT)
Dept: INTERNAL MEDICINE CLINIC | Facility: CLINIC | Age: 89
End: 2024-12-16
Payer: MEDICARE

## 2024-12-16 VITALS
WEIGHT: 162 LBS | SYSTOLIC BLOOD PRESSURE: 138 MMHG | RESPIRATION RATE: 16 BRPM | TEMPERATURE: 97.6 F | BODY MASS INDEX: 29.81 KG/M2 | DIASTOLIC BLOOD PRESSURE: 70 MMHG | HEART RATE: 82 BPM | HEIGHT: 62 IN | OXYGEN SATURATION: 99 %

## 2024-12-16 DIAGNOSIS — E03.9 ACQUIRED HYPOTHYROIDISM: ICD-10-CM

## 2024-12-16 DIAGNOSIS — N18.31 STAGE 3A CHRONIC KIDNEY DISEASE (HCC): ICD-10-CM

## 2024-12-16 DIAGNOSIS — I65.23 CAROTID STENOSIS, ASYMPTOMATIC, BILATERAL: ICD-10-CM

## 2024-12-16 DIAGNOSIS — M15.0 PRIMARY OSTEOARTHRITIS INVOLVING MULTIPLE JOINTS: ICD-10-CM

## 2024-12-16 DIAGNOSIS — Z23 NEED FOR COVID-19 VACCINE: ICD-10-CM

## 2024-12-16 DIAGNOSIS — E78.2 MIXED HYPERLIPIDEMIA: ICD-10-CM

## 2024-12-16 DIAGNOSIS — I10 ESSENTIAL HYPERTENSION: ICD-10-CM

## 2024-12-16 DIAGNOSIS — R73.03 PREDIABETES: ICD-10-CM

## 2024-12-16 DIAGNOSIS — K21.9 GASTROESOPHAGEAL REFLUX DISEASE WITHOUT ESOPHAGITIS: ICD-10-CM

## 2024-12-16 DIAGNOSIS — I35.0 AORTIC VALVE STENOSIS, ETIOLOGY OF CARDIAC VALVE DISEASE UNSPECIFIED: ICD-10-CM

## 2024-12-16 DIAGNOSIS — Z00.00 MEDICARE ANNUAL WELLNESS VISIT, SUBSEQUENT: Primary | ICD-10-CM

## 2024-12-16 PROCEDURE — 91320 SARSCV2 VAC 30MCG TRS-SUC IM: CPT | Performed by: INTERNAL MEDICINE

## 2024-12-16 PROCEDURE — 90480 ADMN SARSCOV2 VAC 1/ONLY CMP: CPT | Performed by: INTERNAL MEDICINE

## 2024-12-16 PROCEDURE — G0439 PPPS, SUBSEQ VISIT: HCPCS | Performed by: INTERNAL MEDICINE

## 2024-12-16 PROCEDURE — 99213 OFFICE O/P EST LOW 20 MIN: CPT | Performed by: INTERNAL MEDICINE

## 2024-12-16 NOTE — ASSESSMENT & PLAN NOTE
She has been seen and followed by vascular surgery presently asymptomatic.  Last Doppler test October 28, 2024 revealed less than 50% on the left side greater than 70% on the right side no change.

## 2024-12-16 NOTE — ASSESSMENT & PLAN NOTE
Lab Results   Component Value Date    EGFR 53 12/14/2024    EGFR 53 05/30/2024    EGFR 51 10/07/2023    CREATININE 0.93 12/14/2024    CREATININE 0.94 05/30/2024    CREATININE 0.97 10/07/2023   CKD stable advised to maintain hydration.

## 2024-12-16 NOTE — ASSESSMENT & PLAN NOTE
Patient  was advised to decrease portion size.  Advised to decrease carb, sugar, cholesterol intake.  Advised to exercise 3-5 times per week as much as she can.  Advised to lose weight.

## 2024-12-16 NOTE — ASSESSMENT & PLAN NOTE
Cholesterol 151, triglyceride 84, HDL 60, LDL 76 advised to continue present dose of pravastatin and low-cholesterol low carbs diet.

## 2024-12-16 NOTE — PROGRESS NOTES
Name: Delaney Pierre      : 1933      MRN: [id number]  Encounter Provider: Ranjan Patel MD  Encounter Date: 2024   Encounter department: [facility] INTERNAL MEDICINE    Assessment & Plan  Medicare annual wellness visit, subsequent         Essential hypertension  Blood pressure at home 120-130/70 per patient.  She advised to continue same medications and low-salt diet.       Carotid stenosis, asymptomatic, bilateral  She has been seen and followed by vascular surgery presently asymptomatic.  Last Doppler test 2024 revealed less than 50% on the left side greater than 70% on the right side no change.       Aortic valve stenosis, etiology of cardiac valve disease unspecified  Has been seen and followed by cardiologist.  Patient denies any chest pain or shortness of breath at present.       Gastroesophageal reflux disease without esophagitis  Symptoms are controlled on famotidine she takes only as needed and has been watching diet.       Acquired hypothyroidism  Last TSH 1.5 advised to continue same dose of levothyroxine.       Primary osteoarthritis involving multiple joints  She gets pain in the joints of the knees sometimes shoulders and hands most likely osteoarthritis for which she takes as needed acetaminophen.       BMI 29.0-29.9,adult  Patient  was advised to decrease portion size.  Advised to decrease carb, sugar, cholesterol intake.  Advised to exercise 3-5 times per week as much as she can.  Advised to lose weight.       Mixed hyperlipidemia  Cholesterol 151, triglyceride 84, HDL 60, LDL 76 advised to continue present dose of pravastatin and low-cholesterol low carbs diet.       Prediabetes  Hemoglobin A1c decreased from 5.7-5.3.  Patient states she has been watching her diet for sugar and carbs intake.       Stage 3a chronic kidney disease (HCC)  Lab Results   Component Value Date    EGFR 53 2024    EGFR 53 2024    EGFR 51 10/07/2023     CREATININE 0.93 12/14/2024    CREATININE 0.94 05/30/2024    CREATININE 0.97 10/07/2023   CKD stable advised to maintain hydration.       Need for COVID-19 vaccine    Orders:  •  COVID-19 Pfizer mRNA vaccine 12 yr and older (Comirnaty pre-filled syringe)       Preventive health issues were discussed with patient, and age appropriate screening tests were ordered as noted in patient's After Visit Summary. Personalized health advice and appropriate referrals for health education or preventive services given if needed, as noted in patient's After Visit Summary.    History of Present Illness     HPI   89-year-old white female patient presents for Medicare annual wellness examination as well as follow-up of her medical problems.    Patient Care Team:  Ranjan Patel MD as PCP - General (Internal Medicine)  MD Crystal Godfrey PA-JODY Licona MD    Review of Systems   Constitutional:  Negative for chills and fever.   HENT:  Negative for congestion, ear pain, hearing loss, nosebleeds, sinus pain, sore throat and trouble swallowing.    Eyes:  Negative for discharge, redness and visual disturbance.   Respiratory:  Negative for cough, chest tightness and shortness of breath.    Cardiovascular:  Negative for chest pain and palpitations.   Gastrointestinal:  Negative for abdominal pain, blood in stool, constipation, diarrhea, nausea and vomiting.   Genitourinary:  Negative for dysuria, flank pain and hematuria.   Musculoskeletal:  Positive for arthralgias. Negative for myalgias and neck pain.   Skin:  Negative for color change and rash.   Neurological:  Negative for dizziness, speech difficulty, weakness and headaches.   Hematological:  Does not bruise/bleed easily.   Psychiatric/Behavioral:  Negative for agitation and behavioral problems.        Medical History Reviewed by provider this encounter:       Annual Wellness Visit Questionnaire   Delaney is here for her Subsequent Wellness visit. Last  Medicare Wellness visit information reviewed, patient interviewed and updates made to the record today.      Health Risk Assessment:   Patient rates overall health as very good. Patient feels that their physical health rating is same. Patient is very satisfied with their life. Eyesight was rated as same. Hearing was rated as slightly worse. Patient feels that their emotional and mental health rating is same. Patients states they are never, rarely angry. Patient states they are sometimes unusually tired/fatigued. Pain experienced in the last 7 days has been some. Patient's pain rating has been 1/10. Patient states that she has experienced no weight loss or gain in last 6 months.     Depression Screening:   PHQ-2 Score: 0      Fall Risk Screening:   In the past year, patient has experienced: no history of falling in past year      Urinary Incontinence Screening:   Patient has not leaked urine accidently in the last six months.     Home Safety:  Patient does not have trouble with stairs inside or outside of their home. Patient has working smoke alarms and has working carbon monoxide detector. Home safety hazards include: none.     Nutrition:   Current diet is Low Cholesterol and No Added Salt.     Medications:   Patient is not currently taking any over-the-counter supplements. Patient is able to manage medications.     Activities of Daily Living (ADLs)/Instrumental Activities of Daily Living (IADLs):   Walk and transfer into and out of bed and chair?: Yes  Dress and groom yourself?: Yes    Bathe or shower yourself?: Yes    Feed yourself? Yes  Do your laundry/housekeeping?: Yes  Manage your money, pay your bills and track your expenses?: Yes  Make your own meals?: Yes    Do your own shopping?: Yes    Previous Hospitalizations:   Any hospitalizations or ED visits within the last 12 months?: No      Advance Care Planning:   Living will: Yes    Durable POA for healthcare: Yes    Advanced directive: Yes    Advanced  directive counseling given: Yes    ACP document given: Yes    Patient declined ACP directive: No      Cognitive Screening:   Provider or family/friend/caregiver concerned regarding cognition?: No    PREVENTIVE SCREENINGS      Cardiovascular Screening:    General: History Lipid Disorder and Screening Current      Diabetes Screening:     General: Screening Current      Colorectal Cancer Screening:     General: Screening Not Indicated      Breast Cancer Screening:     General: Screening Not Indicated      Cervical Cancer Screening:    General: Screening Not Indicated      Abdominal Aortic Aneurysm (AAA) Screening:        General: Screening Not Indicated      Lung Cancer Screening:     General: Screening Not Indicated    Screening, Brief Intervention, and Referral to Treatment (SBIRT)    Screening  Typical number of drinks in a day: 0  Typical number of drinks in a week: 0  Interpretation: Low risk drinking behavior.    AUDIT-C Screenin) How often did you have a drink containing alcohol in the past year? never  2) How many drinks did you have on a typical day when you were drinking in the past year? 0  3) How often did you have 6 or more drinks on one occasion in the past year? never    AUDIT-C Score: 0  Interpretation: Score 0-2 (female): Negative screen for alcohol misuse    Single Item Drug Screening:  How often have you used an illegal drug (including marijuana) or a prescription medication for non-medical reasons in the past year? never    Single Item Drug Screen Score: 0  Interpretation: Negative screen for possible drug use disorder    Brief Intervention  Alcohol & drug use screenings were reviewed. No concerns regarding substance use disorder identified.     Other Counseling Topics:   Car/seat belt/driving safety, skin self-exam, sunscreen and calcium and vitamin D intake and regular weightbearing exercise.     Social Drivers of Health     Financial Resource Strain: Low Risk  (10/31/2023)    Overall  "Financial Resource Strain (CARDIA)    • Difficulty of Paying Living Expenses: Not hard at all   Food Insecurity: No Food Insecurity (12/12/2024)    Hunger Vital Sign    • Worried About Running Out of Food in the Last Year: Never true    • Ran Out of Food in the Last Year: Never true   Transportation Needs: No Transportation Needs (12/12/2024)    PRAPARE - Transportation    • Lack of Transportation (Medical): No    • Lack of Transportation (Non-Medical): No   Housing Stability: Low Risk  (12/12/2024)    Housing Stability Vital Sign    • Unable to Pay for Housing in the Last Year: No    • Number of Times Moved in the Last Year: 0    • Homeless in the Last Year: No   Utilities: Not At Risk (12/12/2024)    [facility] Utilities    • Threatened with loss of utilities: No     No results found.    Objective   /70 (BP Location: Left arm, Patient Position: Sitting, Cuff Size: Standard)   Pulse 82   Temp 97.6 °F (36.4 °C) (Temporal)   Resp 16   Ht 5' 2\" (1.575 m)   Wt 73.5 kg (162 lb)   SpO2 99%   BMI 29.63 kg/m²     Physical Exam  Vitals and nursing note reviewed.   Constitutional:       General: She is not in acute distress.     Appearance: Normal appearance. She is well-developed.   HENT:      Head: Normocephalic and atraumatic.      Right Ear: External ear normal.      Left Ear: External ear normal.      Nose: Nose normal.      Mouth/Throat:      Mouth: Mucous membranes are moist.      Pharynx: Oropharynx is clear.   Eyes:      General: No scleral icterus.        Right eye: No discharge.         Left eye: No discharge.      Extraocular Movements: Extraocular movements intact.      Conjunctiva/sclera: Conjunctivae normal.   Cardiovascular:      Rate and Rhythm: Normal rate and regular rhythm.      Pulses: Normal pulses.      Heart sounds: Murmur heard.   Pulmonary:      Effort: Pulmonary effort is normal. No respiratory distress.      Breath sounds: Normal breath sounds. No wheezing or rales.   Abdominal:      " General: Bowel sounds are normal.      Palpations: Abdomen is soft.      Tenderness: There is no abdominal tenderness.   Musculoskeletal:         General: No swelling. Normal range of motion.      Cervical back: Normal range of motion and neck supple.      Right lower leg: No edema.      Left lower leg: No edema.   Skin:     General: Skin is warm and dry.      Capillary Refill: Capillary refill takes less than 2 seconds.      Findings: No rash.   Neurological:      General: No focal deficit present.      Mental Status: She is alert and oriented to person, place, and time.   Psychiatric:         Mood and Affect: Mood normal.         Behavior: Behavior normal.

## 2024-12-16 NOTE — ASSESSMENT & PLAN NOTE
Blood pressure at home 120-130/70 per patient.  She advised to continue same medications and low-salt diet.

## 2024-12-16 NOTE — ASSESSMENT & PLAN NOTE
Hemoglobin A1c decreased from 5.7-5.3.  Patient states she has been watching her diet for sugar and carbs intake.

## 2024-12-16 NOTE — ASSESSMENT & PLAN NOTE
She gets pain in the joints of the knees sometimes shoulders and hands most likely osteoarthritis for which she takes as needed acetaminophen.

## 2024-12-16 NOTE — PATIENT INSTRUCTIONS
Patient was advised to continue present medications. discussed with the patient medications and laboratory data in detail.  Follow-up with me in 3 months or as advised.  If any blood test was ordered please do 1 week prior to next appointment unless advise to get earlier.  If you have any questions please call the office [phone]

## 2024-12-16 NOTE — ASSESSMENT & PLAN NOTE
Has been seen and followed by cardiologist.  Patient denies any chest pain or shortness of breath at present.

## 2025-01-15 PROBLEM — Z00.00 MEDICARE ANNUAL WELLNESS VISIT, SUBSEQUENT: Status: RESOLVED | Noted: 2024-12-16 | Resolved: 2025-01-15

## 2025-03-17 ENCOUNTER — RA CDI HCC (OUTPATIENT)
Dept: OTHER | Facility: HOSPITAL | Age: OVER 89
End: 2025-03-17

## 2025-03-20 ENCOUNTER — OFFICE VISIT (OUTPATIENT)
Dept: INTERNAL MEDICINE CLINIC | Facility: CLINIC | Age: OVER 89
End: 2025-03-20
Payer: MEDICARE

## 2025-03-20 VITALS
TEMPERATURE: 98.3 F | HEART RATE: 80 BPM | BODY MASS INDEX: 29.26 KG/M2 | OXYGEN SATURATION: 99 % | WEIGHT: 159 LBS | SYSTOLIC BLOOD PRESSURE: 136 MMHG | DIASTOLIC BLOOD PRESSURE: 70 MMHG | HEIGHT: 62 IN | RESPIRATION RATE: 18 BRPM

## 2025-03-20 DIAGNOSIS — N18.31 STAGE 3A CHRONIC KIDNEY DISEASE (HCC): ICD-10-CM

## 2025-03-20 DIAGNOSIS — R73.03 PREDIABETES: ICD-10-CM

## 2025-03-20 DIAGNOSIS — Z95.0 HISTORY OF PERMANENT CARDIAC PACEMAKER PLACEMENT: ICD-10-CM

## 2025-03-20 DIAGNOSIS — I10 ESSENTIAL HYPERTENSION: Primary | ICD-10-CM

## 2025-03-20 DIAGNOSIS — E03.9 ACQUIRED HYPOTHYROIDISM: ICD-10-CM

## 2025-03-20 DIAGNOSIS — I35.0 AORTIC VALVE STENOSIS, ETIOLOGY OF CARDIAC VALVE DISEASE UNSPECIFIED: ICD-10-CM

## 2025-03-20 DIAGNOSIS — Z79.899 HIGH RISK MEDICATION USE: ICD-10-CM

## 2025-03-20 DIAGNOSIS — E78.2 MIXED HYPERLIPIDEMIA: ICD-10-CM

## 2025-03-20 DIAGNOSIS — K21.9 GASTROESOPHAGEAL REFLUX DISEASE WITHOUT ESOPHAGITIS: ICD-10-CM

## 2025-03-20 PROCEDURE — 99213 OFFICE O/P EST LOW 20 MIN: CPT | Performed by: INTERNAL MEDICINE

## 2025-03-20 PROCEDURE — G2211 COMPLEX E/M VISIT ADD ON: HCPCS | Performed by: INTERNAL MEDICINE

## 2025-03-20 NOTE — ASSESSMENT & PLAN NOTE
Systolic blood pressure slightly elevated but otherwise blood pressure is well-controlled.  Will observe on present medications and low-salt diet.

## 2025-03-20 NOTE — ASSESSMENT & PLAN NOTE
Lab Results   Component Value Date    EGFR 56 (L) 02/17/2025    EGFR 53 12/14/2024    EGFR 53 05/30/2024    CREATININE 0.96 02/17/2025    CREATININE 0.93 12/14/2024    CREATININE 0.94 05/30/2024     CKD stable advised to maintain hydration will follow electrolytes renal function.  Orders:  •  Comprehensive metabolic panel; Future

## 2025-03-20 NOTE — ASSESSMENT & PLAN NOTE
Last TSH therapeutic 1.5 advised to continue same dose of levothyroxine will follow TSH.  Orders:  •  TSH, 3rd generation; Future

## 2025-03-20 NOTE — ASSESSMENT & PLAN NOTE
Cholesterol 151, triglyceride 84, HDL 60, LDL 74 advised to continue present medications and low cholesterol low-carb diet we will follow lipid panel.  Orders:  •  Comprehensive metabolic panel; Future  •  Lipid panel; Future

## 2025-03-20 NOTE — ASSESSMENT & PLAN NOTE
Occasionally she takes famotidine depending on the foods she eats otherwise reflux well-controlled.

## 2025-03-20 NOTE — ASSESSMENT & PLAN NOTE
A1c decreased from 5.7-5.3 advised to watch diet for sugar and carbs intake.  Orders:  •  Comprehensive metabolic panel; Future

## 2025-03-20 NOTE — PROGRESS NOTES
Name: Delaney Pierre      : 1933      MRN: [id number]  Encounter Provider: Ranjan Patel MD  Encounter Date: 3/20/2025   Encounter department: [facility] INTERNAL MEDICINE  :  Assessment & Plan  Essential hypertension  Systolic blood pressure slightly elevated but otherwise blood pressure is well-controlled.  Will observe on present medications and low-salt diet.   Aortic valve stenosis, etiology of cardiac valve disease unspecified  Patient was seen by cardiothoracic surgery going for surgery.       Gastroesophageal reflux disease without esophagitis  Occasionally she takes famotidine depending on the foods she eats otherwise reflux well-controlled.       Acquired hypothyroidism  Last TSH therapeutic 1.5 advised to continue same dose of levothyroxine will follow TSH.  Orders:  •  TSH, 3rd generation; Future    Stage 3a chronic kidney disease (HCC)  Lab Results   Component Value Date    EGFR 56 (L) 2025    EGFR 53 2024    EGFR 53 2024    CREATININE 0.96 2025    CREATININE 0.93 2024    CREATININE 0.94 2024     CKD stable advised to maintain hydration will follow electrolytes renal function.  Orders:  •  Comprehensive metabolic panel; Future    BMI 29.0-29.9,adult  Patient  was advised to decrease portion size.  Advised to decrease carb, sugar, cholesterol intake.  Advised to exercise 3-5 times per week as much as you can.  Advised to lose weight.         Mixed hyperlipidemia  Cholesterol 151, triglyceride 84, HDL 60, LDL 74 advised to continue present medications and low cholesterol low-carb diet we will follow lipid panel.  Orders:  •  Comprehensive metabolic panel; Future  •  Lipid panel; Future    Prediabetes  A1c decreased from 5.7-5.3 advised to watch diet for sugar and carbs intake.  Orders:  •  Comprehensive metabolic panel; Future    High risk medication use    Orders:  •  Comprehensive metabolic panel; Future    History of permanent  cardiac pacemaker placement                History of Present Illness   HPI  89-year-old white female patient presents to follow-up her medical problems.      Current Outpatient Medications:   •  acetaminophen (TYLENOL) 500 mg tablet, Take 500 mg by mouth 2 (two) times a day as needed, Disp: , Rfl:   •  amLODIPine (NORVASC) 5 mg tablet, Take 1 tablet (5 mg total) by mouth daily at bedtime, Disp: , Rfl: 0  •  aspirin 81 mg chewable tablet, Chew 81 mg daily, Disp: , Rfl:   •  carvedilol (COREG) 12.5 mg tablet, Take 1 tablet (12.5 mg total) by mouth 2 (two) times a day with meals, Disp: 60 tablet, Rfl: 0  •  enalapril (VASOTEC) 20 mg tablet, Take 20 mg by mouth 2 (two) times a day, Disp: , Rfl:   •  ezetimibe (ZETIA) 10 mg tablet, Take 10 mg by mouth daily, Disp: , Rfl:   •  famotidine (PEPCID) 20 mg tablet, Take 20 mg by mouth as needed, Disp: , Rfl:   •  latanoprost (XALATAN) 0.005 % ophthalmic solution, INSTILL 1 DROP IN EACH EYE AT BEDTIME, Disp: , Rfl:   •  levothyroxine 50 mcg tablet, TAKE 1 TABLET BY MOUTH EVERY DAY, Disp: 90 tablet, Rfl: 1  •  pravastatin (PRAVACHOL) 40 mg tablet, Take 40 mg by mouth 2 (two) times a day Mon,Friday x2, one ever other day x1, Disp: , Rfl:      Past Medical History:   Diagnosis Date   • Acute rhinitis 09/12/2024   • Allergic rhinitis    • Aortic stenosis, moderate    • Bleeding per rectum    • Blood glucose elevated    • BMI 26.0-26.9,adult 11/08/2022   • BMI 29.0-29.9,adult 10/19/2022   • BMI 30.0-30.9,adult 10/14/2021   • BMI 32.0-32.9,adult 02/24/2021   • Carotid arterial disease (HCC)    • Colonoscopy refused    • Conjunctivitis, allergic    • Constipation    • COVID-19 12/13/2023   • Disease of thyroid gland    • Elbow fracture    • Encounter for support and coordination of transition of care 11/08/2022   • Excessive cerumen in right ear canal 11/08/2022   • GERD (gastroesophageal reflux disease)    • Herpes zoster    • Hyperlipidemia    • Hypertension    • Medicare annual  "wellness visit, subsequent 10/14/2021   • Medicare annual wellness visit, subsequent 10/19/2022   • Medicare annual wellness visit, subsequent 10/31/2023   • Medicare annual wellness visit, subsequent 12/16/2024   • Neck pain 04/24/2023   • Osteoarthritis of multiple joints 01/14/2022   • Other constipation 11/08/2022   • Other specified anemias 11/08/2022   • Pacemaker    • Post herpetic neuralgia    • Prediabetes 09/12/2024   • Skin rash 05/26/2021   • Stage 3a chronic kidney disease (HCC) 01/14/2022   • Vomiting 11/08/2022      Review of Systems   Constitutional:  Positive for fatigue (Sometimes she feels tired). Negative for chills and fever.   HENT:  Negative for congestion, ear pain, hearing loss, nosebleeds, sinus pain, sore throat and trouble swallowing.    Eyes:  Negative for discharge, redness and visual disturbance.   Respiratory:  Negative for cough, chest tightness and shortness of breath.    Cardiovascular:  Negative for chest pain and palpitations.   Gastrointestinal:  Negative for abdominal pain, blood in stool, constipation, diarrhea, nausea and vomiting.   Genitourinary:  Negative for dysuria, flank pain and hematuria.   Musculoskeletal:  Positive for arthralgias (Sometimes she gets pain in the knees but not bad.). Negative for neck pain.   Skin:  Negative for rash.   Neurological:  Negative for dizziness, speech difficulty, weakness and headaches.   Hematological:  Does not bruise/bleed easily.   Psychiatric/Behavioral:  Negative for agitation and behavioral problems.          Objective   /70 (BP Location: Left arm, Patient Position: Sitting, Cuff Size: Large)   Pulse 80   Temp 98.3 °F (36.8 °C) (Temporal)   Resp 18   Ht 5' 2\" (1.575 m)   Wt 72.1 kg (159 lb)   SpO2 99%   BMI 29.08 kg/m²      Physical Exam  Vitals and nursing note reviewed.   Constitutional:       General: She is not in acute distress.     Appearance: Normal appearance. She is well-developed.   HENT:      Head: " Normocephalic and atraumatic.      Right Ear: External ear normal.      Left Ear: External ear normal.      Nose: Nose normal.      Mouth/Throat:      Mouth: Mucous membranes are moist.      Pharynx: Oropharynx is clear.   Eyes:      General:         Right eye: No discharge.         Left eye: No discharge.      Extraocular Movements: Extraocular movements intact.      Conjunctiva/sclera: Conjunctivae normal.   Cardiovascular:      Rate and Rhythm: Normal rate and regular rhythm.      Pulses: Normal pulses.      Heart sounds: Murmur heard.   Pulmonary:      Effort: Pulmonary effort is normal. No respiratory distress.      Breath sounds: Normal breath sounds. No wheezing.   Abdominal:      General: Bowel sounds are normal.      Palpations: Abdomen is soft.      Tenderness: There is no abdominal tenderness.   Musculoskeletal:         General: No swelling. Normal range of motion.      Cervical back: Normal range of motion and neck supple.      Right lower leg: No edema.      Left lower leg: No edema.   Skin:     General: Skin is warm and dry.      Findings: No rash.   Neurological:      General: No focal deficit present.      Mental Status: She is alert and oriented to person, place, and time.   Psychiatric:         Mood and Affect: Mood normal.         Behavior: Behavior normal.

## 2025-03-20 NOTE — PATIENT INSTRUCTIONS
Patient was advised to continue present medications. discussed with the patient medications and laboratory data in detail.  Follow-up with me in 3 months or as advised.  If any blood test was ordered please do 1 week prior to next appointment unless advise to get earlier.  If you have any questions please call the office [phone]

## 2025-05-01 ENCOUNTER — HOSPITAL ENCOUNTER (OUTPATIENT)
Dept: VASCULAR ULTRASOUND | Facility: HOSPITAL | Age: OVER 89
Discharge: HOME | End: 2025-05-01
Payer: MEDICARE

## 2025-05-01 DIAGNOSIS — I65.23 CAROTID STENOSIS, ASYMPTOMATIC, BILATERAL: ICD-10-CM

## 2025-05-01 PROCEDURE — 93880 EXTRACRANIAL BILAT STUDY: CPT | Performed by: SURGERY

## 2025-05-01 PROCEDURE — 93880 EXTRACRANIAL BILAT STUDY: CPT

## 2025-05-04 ENCOUNTER — RESULTS FOLLOW-UP (OUTPATIENT)
Dept: VASCULAR SURGERY | Facility: CLINIC | Age: OVER 89
End: 2025-05-04

## 2025-06-03 DIAGNOSIS — E03.9 ACQUIRED HYPOTHYROIDISM: ICD-10-CM

## 2025-06-03 RX ORDER — LEVOTHYROXINE SODIUM 50 UG/1
50 TABLET ORAL DAILY
Qty: 90 TABLET | Refills: 1 | Status: SHIPPED | OUTPATIENT
Start: 2025-06-03

## 2025-06-23 ENCOUNTER — OFFICE VISIT (OUTPATIENT)
Dept: INTERNAL MEDICINE CLINIC | Facility: CLINIC | Age: OVER 89
End: 2025-06-23
Payer: MEDICARE

## 2025-06-23 VITALS
OXYGEN SATURATION: 99 % | WEIGHT: 156 LBS | HEART RATE: 80 BPM | TEMPERATURE: 97.8 F | HEIGHT: 62 IN | DIASTOLIC BLOOD PRESSURE: 70 MMHG | BODY MASS INDEX: 28.71 KG/M2 | SYSTOLIC BLOOD PRESSURE: 154 MMHG

## 2025-06-23 DIAGNOSIS — R73.03 PREDIABETES: ICD-10-CM

## 2025-06-23 DIAGNOSIS — I10 ESSENTIAL HYPERTENSION: Primary | ICD-10-CM

## 2025-06-23 DIAGNOSIS — N18.2 STAGE 2 CHRONIC KIDNEY DISEASE: ICD-10-CM

## 2025-06-23 DIAGNOSIS — I65.23 CAROTID STENOSIS, ASYMPTOMATIC, BILATERAL: ICD-10-CM

## 2025-06-23 DIAGNOSIS — Z95.2 S/P AVR (AORTIC VALVE REPLACEMENT): ICD-10-CM

## 2025-06-23 DIAGNOSIS — K21.9 GASTROESOPHAGEAL REFLUX DISEASE WITHOUT ESOPHAGITIS: ICD-10-CM

## 2025-06-23 DIAGNOSIS — E78.2 MIXED HYPERLIPIDEMIA: ICD-10-CM

## 2025-06-23 DIAGNOSIS — Z95.0 STATUS POST BIVENTRICULAR PACEMAKER: ICD-10-CM

## 2025-06-23 DIAGNOSIS — K59.09 OTHER CONSTIPATION: ICD-10-CM

## 2025-06-23 DIAGNOSIS — I44.2 HEART BLOCK AV COMPLETE (HCC): ICD-10-CM

## 2025-06-23 DIAGNOSIS — E03.9 ACQUIRED HYPOTHYROIDISM: ICD-10-CM

## 2025-06-23 PROBLEM — S06.5XAA SDH (SUBDURAL HEMATOMA) (HCC): Status: RESOLVED | Noted: 2018-03-11 | Resolved: 2025-06-23

## 2025-06-23 PROCEDURE — 99214 OFFICE O/P EST MOD 30 MIN: CPT | Performed by: INTERNAL MEDICINE

## 2025-06-23 PROCEDURE — G2211 COMPLEX E/M VISIT ADD ON: HCPCS | Performed by: INTERNAL MEDICINE

## 2025-06-23 RX ORDER — PRAVASTATIN SODIUM 40 MG
40 TABLET ORAL DAILY
Qty: 90 TABLET | Refills: 1 | Status: SHIPPED | OUTPATIENT
Start: 2025-06-23

## 2025-06-23 RX ORDER — SENNOSIDES 8.6 MG/1
1 TABLET ORAL DAILY
COMMUNITY

## 2025-06-23 RX ORDER — ENALAPRIL MALEATE 20 MG/1
20 TABLET ORAL 2 TIMES DAILY
Qty: 180 TABLET | Refills: 1 | Status: SHIPPED | OUTPATIENT
Start: 2025-06-23

## 2025-06-23 RX ORDER — CARVEDILOL 12.5 MG/1
12.5 TABLET ORAL 2 TIMES DAILY WITH MEALS
Qty: 180 TABLET | Refills: 1 | Status: SHIPPED | OUTPATIENT
Start: 2025-06-23

## 2025-06-23 RX ORDER — EZETIMIBE 10 MG/1
10 TABLET ORAL DAILY
Qty: 90 TABLET | Refills: 1 | Status: SHIPPED | OUTPATIENT
Start: 2025-06-23

## 2025-06-23 RX ORDER — LEVOTHYROXINE SODIUM 50 UG/1
50 TABLET ORAL DAILY
Qty: 90 TABLET | Refills: 1 | Status: SHIPPED | OUTPATIENT
Start: 2025-06-23

## 2025-06-23 RX ORDER — AMLODIPINE BESYLATE 5 MG/1
5 TABLET ORAL
Qty: 90 TABLET | Refills: 1 | Status: SHIPPED | OUTPATIENT
Start: 2025-06-23

## 2025-06-23 NOTE — ASSESSMENT & PLAN NOTE
Last TSH is therapeutic advised to continue same dose of levothyroxine will follow TSH.  Orders:  •  levothyroxine 50 mcg tablet; Take 1 tablet (50 mcg total) by mouth daily  •  TSH, 3rd generation; Future

## 2025-06-23 NOTE — ASSESSMENT & PLAN NOTE
Systolic blood pressure elevated.  Advised to increase amlodipine 5 to 10 mg daily but at present she would like to continue same medications until she has a follow-up appointment with his cardiologist and she will discuss with the cardiologist about her blood pressure also.  Continue present medication low-salt diet.  Orders:  •  amLODIPine (NORVASC) 5 mg tablet; Take 1 tablet (5 mg total) by mouth daily at bedtime  •  carvedilol (COREG) 12.5 mg tablet; Take 1 tablet (12.5 mg total) by mouth 2 (two) times a day with meals  •  enalapril (VASOTEC) 20 mg tablet; Take 1 tablet (20 mg total) by mouth in the morning and 1 tablet (20 mg total) in the evening.  •  Comprehensive metabolic panel; Future  •  CBC and differential; Future

## 2025-06-23 NOTE — PROGRESS NOTES
Name: Delaney Pierre      : 1933      MRN: [id number]  Encounter Provider: Ranjan Patel MD  Encounter Date: 2025   Encounter department: [facility] INTERNAL MEDICINE  :  Assessment & Plan  Essential hypertension  Systolic blood pressure elevated.  Advised to increase amlodipine 5 to 10 mg daily but at present she would like to continue same medications until she has a follow-up appointment with his cardiologist and she will discuss with the cardiologist about her blood pressure also.  Continue present medication low-salt diet.  Orders:  •  amLODIPine (NORVASC) 5 mg tablet; Take 1 tablet (5 mg total) by mouth daily at bedtime  •  carvedilol (COREG) 12.5 mg tablet; Take 1 tablet (12.5 mg total) by mouth 2 (two) times a day with meals  •  enalapril (VASOTEC) 20 mg tablet; Take 1 tablet (20 mg total) by mouth in the morning and 1 tablet (20 mg total) in the evening.  •  Comprehensive metabolic panel; Future  •  CBC and differential; Future    Gastroesophageal reflux disease without esophagitis  Controlled on as needed famotidine.  Orders:  •  CBC and differential; Future    Acquired hypothyroidism  Last TSH is therapeutic advised to continue same dose of levothyroxine will follow TSH.  Orders:  •  levothyroxine 50 mcg tablet; Take 1 tablet (50 mcg total) by mouth daily  •  TSH, 3rd generation; Future    Mixed hyperlipidemia  Last LDL 74.  She takes pravastatin 40 mg daily.  Unable to tolerate higher dose.  Also takes Zetia 10 mg daily as well.  Advised for low-cholesterol low carbs diet and continue present medications.  Orders:  •  ezetimibe (ZETIA) 10 mg tablet; Take 1 tablet (10 mg total) by mouth in the morning.  •  pravastatin (PRAVACHOL) 40 mg tablet; Take 1 tablet (40 mg total) by mouth before bedtime.  •  Comprehensive metabolic panel; Future    Prediabetes  Last A1c decreased from 5.7-5.3 advised to continue low sugar low-carb diet will follow A1c.  Orders:  •   Comprehensive metabolic panel; Future  •  Hemoglobin A1C; Future    Carotid stenosis, asymptomatic, bilateral  Present asymptomatic has been seen and followed with vascular surgery.       S/P AVR (aortic valve replacement)  Presently stable has been seen and followed by cardiologist.       Status post biventricular pacemaker  Presently stable has been seen for by cardiology.       BMI 28.0-28.9,adult  Patient is watching diet and trying to lose weight.       Stage 2 chronic kidney disease  Lab Results   Component Value Date    EGFR 71 03/27/2025    EGFR 63 03/24/2025    EGFR 56 (L) 02/17/2025    CREATININE 0.78 03/27/2025    CREATININE 0.87 03/24/2025    CREATININE 0.96 02/17/2025   GFR better advised to maintain hydration will follow GFR electrolytes.    Orders:  •  Comprehensive metabolic panel; Future  •  CBC and differential; Future    Other constipation  Patient takes Senokot 1 a day.  Advised to increase twice a day and maintain hydration.  If not better will add MiraLAX powder.  Orders:  •  CBC and differential; Future    Heart block AV complete (HCC)  Status post biventricular pacemaker.              History of Present Illness   HPI  89-year-old white female patient here for follow-up of her medical problems.    Current Medications[1]     Past Medical History[2]     Review of Systems   Constitutional:  Negative for chills and fever.   HENT:  Negative for congestion, ear pain, hearing loss, nosebleeds, sinus pain, sore throat and trouble swallowing.    Eyes:  Negative for discharge, redness and visual disturbance.   Respiratory:  Negative for cough, chest tightness and shortness of breath.    Cardiovascular:  Negative for chest pain and palpitations.   Gastrointestinal:  Negative for abdominal pain, blood in stool, constipation, diarrhea, nausea and vomiting.   Genitourinary:  Negative for dysuria, flank pain and hematuria.   Musculoskeletal:  Positive for arthralgias (Sometimes gets pain in the knees.).  "Negative for myalgias and neck pain.   Skin:  Negative for rash.   Neurological:  Negative for dizziness, speech difficulty, weakness and headaches.   Hematological:  Does not bruise/bleed easily.   Psychiatric/Behavioral:  Negative for agitation and behavioral problems.          Objective   /70 (BP Location: Right arm, Patient Position: Sitting, Cuff Size: Adult)   Pulse 80   Temp 97.8 °F (36.6 °C) (Temporal)   Ht 5' 2\" (1.575 m)   Wt 70.8 kg (156 lb)   SpO2 99%   BMI 28.53 kg/m²      Physical Exam  Vitals and nursing note reviewed.   Constitutional:       General: She is not in acute distress.     Appearance: She is well-developed.   HENT:      Head: Normocephalic and atraumatic.      Right Ear: External ear normal.      Left Ear: External ear normal.      Nose: Nose normal.      Mouth/Throat:      Pharynx: Oropharynx is clear.     Eyes:      General: No scleral icterus.        Right eye: No discharge.         Left eye: No discharge.      Extraocular Movements: Extraocular movements intact.      Conjunctiva/sclera: Conjunctivae normal.       Cardiovascular:      Rate and Rhythm: Normal rate and regular rhythm.      Pulses: Normal pulses.      Heart sounds: Murmur heard.   Pulmonary:      Effort: Pulmonary effort is normal. No respiratory distress.      Breath sounds: Normal breath sounds. No wheezing.   Abdominal:      General: There is no distension.      Palpations: Abdomen is soft.      Tenderness: There is no abdominal tenderness.     Musculoskeletal:         General: No swelling. Normal range of motion.      Cervical back: Normal range of motion and neck supple.      Right lower leg: Edema (Has a trace pedal edema.) present.      Left lower leg: Edema (Has a trace pedal edema.) present.     Skin:     General: Skin is warm and dry.      Capillary Refill: Capillary refill takes less than 2 seconds.      Findings: No rash.     Neurological:      General: No focal deficit present.      Mental Status: " She is alert and oriented to person, place, and time.     Psychiatric:         Mood and Affect: Mood normal.         Behavior: Behavior normal.                [1]    Current Outpatient Medications:   •  acetaminophen (TYLENOL) 500 mg tablet, Take 500 mg by mouth as needed in the morning and 500 mg as needed in the evening., Disp: , Rfl:   •  amLODIPine (NORVASC) 5 mg tablet, Take 1 tablet (5 mg total) by mouth daily at bedtime, Disp: 90 tablet, Rfl: 1  •  aspirin 81 mg chewable tablet, Chew 81 mg in the morning., Disp: , Rfl:   •  carvedilol (COREG) 12.5 mg tablet, Take 1 tablet (12.5 mg total) by mouth 2 (two) times a day with meals, Disp: 180 tablet, Rfl: 1  •  enalapril (VASOTEC) 20 mg tablet, Take 1 tablet (20 mg total) by mouth in the morning and 1 tablet (20 mg total) in the evening., Disp: 180 tablet, Rfl: 1  •  ezetimibe (ZETIA) 10 mg tablet, Take 1 tablet (10 mg total) by mouth in the morning., Disp: 90 tablet, Rfl: 1  •  famotidine (PEPCID) 20 mg tablet, Take 20 mg by mouth as needed, Disp: , Rfl:   •  latanoprost (XALATAN) 0.005 % ophthalmic solution, , Disp: , Rfl:   •  levothyroxine 50 mcg tablet, Take 1 tablet (50 mcg total) by mouth daily, Disp: 90 tablet, Rfl: 1  •  pravastatin (PRAVACHOL) 40 mg tablet, Take 1 tablet (40 mg total) by mouth before bedtime., Disp: 90 tablet, Rfl: 1  •  senna (SENOKOT) 8.6 MG tablet, Take 1 tablet by mouth daily, Disp: , Rfl: [2]  Past Medical History:  Diagnosis Date   • Acute rhinitis 09/12/2024   • Allergic rhinitis    • Aortic stenosis, moderate    • Bleeding per rectum    • Blood glucose elevated    • BMI 26.0-26.9,adult 11/08/2022   • BMI 28.0-28.9,adult 06/23/2025   • BMI 29.0-29.9,adult 10/19/2022   • BMI 30.0-30.9,adult 10/14/2021   • BMI 32.0-32.9,adult 02/24/2021   • Carotid arterial disease (HCC)    • Colonoscopy refused    • Conjunctivitis, allergic    • Constipation    • COVID-19 12/13/2023   • Disease of thyroid gland    • Elbow fracture    • Encounter  for support and coordination of transition of care 11/08/2022   • Excessive cerumen in right ear canal 11/08/2022   • GERD (gastroesophageal reflux disease)    • Herpes zoster    • Hyperlipidemia    • Hypertension    • Medicare annual wellness visit, subsequent 10/14/2021   • Medicare annual wellness visit, subsequent 10/19/2022   • Medicare annual wellness visit, subsequent 10/31/2023   • Medicare annual wellness visit, subsequent 12/16/2024   • Neck pain 04/24/2023   • Osteoarthritis of multiple joints 01/14/2022   • Other constipation 11/08/2022   • Other specified anemias 11/08/2022   • Pacemaker    • Post herpetic neuralgia    • Prediabetes 09/12/2024   • S/P AVR (aortic valve replacement) 06/23/2025   • Skin rash 05/26/2021   • Stage 2 chronic kidney disease 06/23/2025   • Stage 3a chronic kidney disease (HCC) 01/14/2022   • Status post biventricular pacemaker 06/23/2025   • Vomiting 11/08/2022

## 2025-06-23 NOTE — PATIENT INSTRUCTIONS
Patient was advised to continue present medications. discussed with the patient medications and laboratory data in detail.  Follow-up with me in 3 months or as advised.  If any blood test was ordered please do 1 week prior to next appointment unless advise to get earlier.  If you have any questions please call the office [phone]

## 2025-06-23 NOTE — ASSESSMENT & PLAN NOTE
Lab Results   Component Value Date    EGFR 71 03/27/2025    EGFR 63 03/24/2025    EGFR 56 (L) 02/17/2025    CREATININE 0.78 03/27/2025    CREATININE 0.87 03/24/2025    CREATININE 0.96 02/17/2025   GFR better advised to maintain hydration will follow GFR electrolytes.    Orders:  •  Comprehensive metabolic panel; Future  •  CBC and differential; Future

## 2025-06-23 NOTE — ASSESSMENT & PLAN NOTE
Last LDL 74.  She takes pravastatin 40 mg daily.  Unable to tolerate higher dose.  Also takes Zetia 10 mg daily as well.  Advised for low-cholesterol low carbs diet and continue present medications.  Orders:  •  ezetimibe (ZETIA) 10 mg tablet; Take 1 tablet (10 mg total) by mouth in the morning.  •  pravastatin (PRAVACHOL) 40 mg tablet; Take 1 tablet (40 mg total) by mouth before bedtime.  •  Comprehensive metabolic panel; Future

## 2025-06-23 NOTE — ASSESSMENT & PLAN NOTE
Patient takes Senokot 1 a day.  Advised to increase twice a day and maintain hydration.  If not better will add MiraLAX powder.  Orders:  •  CBC and differential; Future

## 2025-06-23 NOTE — ASSESSMENT & PLAN NOTE
Last A1c decreased from 5.7-5.3 advised to continue low sugar low-carb diet will follow A1c.  Orders:  •  Comprehensive metabolic panel; Future  •  Hemoglobin A1C; Future

## 2025-07-09 ENCOUNTER — OFFICE VISIT (OUTPATIENT)
Dept: VASCULAR SURGERY | Facility: CLINIC | Age: OVER 89
End: 2025-07-09
Payer: MEDICARE

## 2025-07-09 VITALS
HEART RATE: 75 BPM | WEIGHT: 153 LBS | HEIGHT: 62 IN | BODY MASS INDEX: 28.16 KG/M2 | DIASTOLIC BLOOD PRESSURE: 90 MMHG | SYSTOLIC BLOOD PRESSURE: 142 MMHG

## 2025-07-09 DIAGNOSIS — I65.23 CAROTID STENOSIS, ASYMPTOMATIC, BILATERAL: Primary | ICD-10-CM

## 2025-07-09 DIAGNOSIS — E78.2 MIXED HYPERLIPIDEMIA: ICD-10-CM

## 2025-07-09 PROCEDURE — 99213 OFFICE O/P EST LOW 20 MIN: CPT | Performed by: SURGERY

## 2025-07-09 NOTE — PROGRESS NOTES
Name: Delaney Pierre      : 1933      MRN: [id number]  Encounter Provider: Ifeanyi Leyva MD  Encounter Date: 2025   Encounter department: [facility] [place]  :  Assessment & Plan  Carotid stenosis, asymptomatic, bilateral  89 y/o  female with HTN, HLD, moderate AS, s/p TAVR (), s/p ppm, asymptomatic bilateral carotid stenosis, L>R, CKD 3, GERD, hypothyroid, bilateral hip replacements, arthritis     Reviewed carotid doppler over many years, since 2016 velocities and ratio remains relatively stable.  She is maintained on Zetia 10mg and Pravastatin 40mg with excellent lipid control.  Continue 81mg aspirin as well.  She underwent TAVR this year with good results.     Labs from last years were reviewed, LDL is at goal.  She walks on treadmill for 30 min every other day and does stationary bike 30 min every other day.     No neuro symptoms such as sudden upper or lower ext weakness, numbness, aphasia, dysarthria, imbalance.      Since the carotid stenosis on left and right side remains very stable over past 7-8 years we will go to once a year carotid dopplers to check.  Intervention to be considered only if there is progression of disease.          Orders:  •  VAS carotid complete study; Future    Mixed hyperlipidemia  Well controlled on pravastatin 40mg and ezetimibe 10mg.  Last LDL was 74 which is at goal.      Continue with current plan with cholesterol lowering, daily exercise.  Orders:  •  VAS carotid complete study; Future        History of Present Illness   HPI  Delaney Pierre is a 89 y.o. female who presents for carotid follow up     Patient presents to review CV. Denies s/s CVA.     History obtained from: patient    Review of Systems   Constitutional: Negative.    HENT: Negative.     Eyes: Negative.    Respiratory: Negative.     Cardiovascular: Negative.    Gastrointestinal: Negative.    Endocrine: Negative.    Genitourinary: Negative.    Musculoskeletal: Negative.    Skin:  "Negative.    Allergic/Immunologic: Negative.    Neurological: Negative.    Hematological: Negative.    Psychiatric/Behavioral: Negative.     I have reviewed the ROS as entered and made changes as necessary.    Past Medical History   Past Medical History[1]  Past Surgical History[2]  Family History[3]   reports that she has never smoked. She has never used smokeless tobacco. She reports that she does not drink alcohol and does not use drugs.  Current Outpatient Medications   Medication Instructions   • acetaminophen (TYLENOL) 500 mg, 2 times daily PRN   • amLODIPine (NORVASC) 5 mg, Oral, Daily at bedtime   • aspirin 81 mg, Daily   • carvedilol (COREG) 12.5 mg, Oral, 2 times daily with meals   • enalapril (VASOTEC) 20 mg, Oral, 2 times daily   • ezetimibe (ZETIA) 10 mg, Oral, Daily   • famotidine (PEPCID) 20 mg, As needed   • latanoprost (XALATAN) 0.005 % ophthalmic solution    • levothyroxine 50 mcg, Oral, Daily   • pravastatin (PRAVACHOL) 40 mg, Oral, Daily   • senna (SENOKOT) 8.6 MG tablet 1 tablet, Daily   Allergies[4]      Objective   /90 (BP Location: Left arm, Patient Position: Sitting, Cuff Size: Standard)   Pulse 75   Ht 5' 2\" (1.575 m)   Wt 69.4 kg (153 lb)   BMI 27.98 kg/m²      Physical Exam  Vitals and nursing note reviewed.   Constitutional:       Appearance: Normal appearance.   HENT:      Head: Normocephalic and atraumatic.     Cardiovascular:      Rate and Rhythm: Normal rate and regular rhythm.      Pulses:           Radial pulses are 2+ on the right side and 2+ on the left side.     Musculoskeletal:      Right lower leg: Edema present.      Left lower leg: Edema present.     Skin:     General: Skin is warm and dry.     Neurological:      Mental Status: She is alert.                  [1]  Past Medical History:  Diagnosis Date   • Acute rhinitis 09/12/2024   • Allergic rhinitis    • Aortic stenosis, moderate    • Bleeding per rectum    • Blood glucose elevated    • BMI 26.0-26.9,adult " 11/08/2022   • BMI 28.0-28.9,adult 06/23/2025   • BMI 29.0-29.9,adult 10/19/2022   • BMI 30.0-30.9,adult 10/14/2021   • BMI 32.0-32.9,adult 02/24/2021   • Carotid arterial disease (HCC)    • Colonoscopy refused    • Conjunctivitis, allergic    • Constipation    • COVID-19 12/13/2023   • Disease of thyroid gland    • Elbow fracture    • Encounter for support and coordination of transition of care 11/08/2022   • Excessive cerumen in right ear canal 11/08/2022   • GERD (gastroesophageal reflux disease)    • Herpes zoster    • Hyperlipidemia    • Hypertension    • Medicare annual wellness visit, subsequent 10/14/2021   • Medicare annual wellness visit, subsequent 10/19/2022   • Medicare annual wellness visit, subsequent 10/31/2023   • Medicare annual wellness visit, subsequent 12/16/2024   • Neck pain 04/24/2023   • Osteoarthritis of multiple joints 01/14/2022   • Other constipation 11/08/2022   • Other specified anemias 11/08/2022   • Pacemaker    • Post herpetic neuralgia    • Prediabetes 09/12/2024   • S/P AVR (aortic valve replacement) 06/23/2025   • Skin rash 05/26/2021   • Stage 2 chronic kidney disease 06/23/2025   • Stage 3a chronic kidney disease (HCC) 01/14/2022   • Status post biventricular pacemaker 06/23/2025   • Vomiting 11/08/2022   [2]  Past Surgical History:  Procedure Laterality Date   • CARDIAC PACEMAKER PLACEMENT      ppm   • JOINT REPLACEMENT      both hips   • TOTAL HIP ARTHROPLASTY Left 02/2015   • TUBAL LIGATION     [3]  Family History  Problem Relation Name Age of Onset   • Heart disease Mother Tara    • Cancer Father Isra         colon   [4]  Allergies  Allergen Reactions   • Ampicillin    • Codeine GI Intolerance     Light headed   • Penicillins GI Intolerance

## 2025-07-09 NOTE — ASSESSMENT & PLAN NOTE
Well controlled on pravastatin 40mg and ezetimibe 10mg.  Last LDL was 74 which is at goal.      Continue with current plan with cholesterol lowering, daily exercise.  Orders:  •  VAS carotid complete study; Future

## 2025-07-09 NOTE — PATIENT INSTRUCTIONS
Reviewed carotid doppler over many years, since 2016 velocities and ratio remains relatively stable.  She is maintained on Zetia 10mg and Pravastatin 40mg with excellent lipid control.  Continue 81mg aspirin as well.  She underwent TAVR this year with good results.      Labs from last years were reviewed, LDL is at goal.  She walks on treadmill for 30 min every other day and does stationary bike 30 min every other day.       Since the carotid stenosis on left and right side remains very stable over past 7-8 years we will go to once a year carotid dopplers to check.  Intervention to be considered only if there is progression of disease.

## 2025-07-09 NOTE — ASSESSMENT & PLAN NOTE
89 y/o  female with HTN, HLD, moderate AS, s/p TAVR (2025), s/p ppm, asymptomatic bilateral carotid stenosis, L>R, CKD 3, GERD, hypothyroid, bilateral hip replacements, arthritis     Reviewed carotid doppler over many years, since 2016 velocities and ratio remains relatively stable.  She is maintained on Zetia 10mg and Pravastatin 40mg with excellent lipid control.  Continue 81mg aspirin as well.  She underwent TAVR this year with good results.     Labs from last years were reviewed, LDL is at goal.  She walks on treadmill for 30 min every other day and does stationary bike 30 min every other day.     No neuro symptoms such as sudden upper or lower ext weakness, numbness, aphasia, dysarthria, imbalance.      Since the carotid stenosis on left and right side remains very stable over past 7-8 years we will go to once a year carotid dopplers to check.  Intervention to be considered only if there is progression of disease.          Orders:  •  VAS carotid complete study; Future